# Patient Record
Sex: MALE | Race: WHITE | NOT HISPANIC OR LATINO | Employment: OTHER | URBAN - METROPOLITAN AREA
[De-identification: names, ages, dates, MRNs, and addresses within clinical notes are randomized per-mention and may not be internally consistent; named-entity substitution may affect disease eponyms.]

---

## 2017-03-06 ENCOUNTER — OFFICE VISIT (OUTPATIENT)
Dept: CARDIOLOGY | Facility: CLINIC | Age: 67
End: 2017-03-06

## 2017-03-06 VITALS
SYSTOLIC BLOOD PRESSURE: 130 MMHG | HEART RATE: 95 BPM | BODY MASS INDEX: 30.13 KG/M2 | DIASTOLIC BLOOD PRESSURE: 90 MMHG | WEIGHT: 192 LBS | HEIGHT: 67 IN

## 2017-03-06 DIAGNOSIS — I25.10 CORONARY ARTERY DISEASE INVOLVING NATIVE CORONARY ARTERY OF NATIVE HEART WITHOUT ANGINA PECTORIS: Primary | ICD-10-CM

## 2017-03-06 DIAGNOSIS — I48.19 PERSISTENT ATRIAL FIBRILLATION (HCC): ICD-10-CM

## 2017-03-06 PROCEDURE — 99214 OFFICE O/P EST MOD 30 MIN: CPT | Performed by: INTERNAL MEDICINE

## 2017-03-06 PROCEDURE — 93000 ELECTROCARDIOGRAM COMPLETE: CPT | Performed by: INTERNAL MEDICINE

## 2017-03-06 NOTE — PROGRESS NOTES
Date of Office Visit: 2017  Encounter Provider: Kermit Ashby MD  Place of Service: [facility] CARDIOLOGY  Patient Name: Vicente Baker  :1950  [id number]    Chief Complaint   Patient presents with   • Coronary Artery Disease   • Atrial Fibrillation   :     HPI: Vicente Baker is a 66 y.o. male   He is here for a followup.  He has coronary disease and persistent atrial fibrillation that is asymptomatic.   In , we placed a 35 x 23 Cypher to his proximal circumflex, a 30 x 8 Cypher in his circumflex.  He really did not have any other significant coronary disease, and he has had normal left ventricular function.  In , he was noted to have asymptomatic atrial fibrillation.  He has not had any chest pain, shortness of breath, PND, orthopnea, edema, syncope or palpitations.  He otherwise is doing well.  No bleeding difficulties.            Past Medical History   Diagnosis Date   • Atrial fibrillation    • Bradycardia    • Coronary artery disease    • Hyperlipidemia    • Myocardial infarction        Past Surgical History   Procedure Laterality Date   • Coronary angioplasty with stent placement     • Cardiac catheterization         Social History     Social History   • Marital status:      Spouse name: N/A   • Number of children: N/A   • Years of education: N/A     Occupational History   • Not on file.     Social History Main Topics   • Smoking status: Former Smoker   • Smokeless tobacco: Not on file      Comment: caffeine use   • Alcohol use 1.2 oz/week     2 Glasses of wine per week      Comment: social   • Drug use: No   • Sexual activity: Not on file     Other Topics Concern   • Not on file     Social History Narrative       Family History   Problem Relation Age of Onset   • Esophageal cancer Father        Review of Systems   Constitution: Negative for decreased appetite, fever, malaise/fatigue and weight loss.   HENT: Negative for nosebleeds.    Eyes:  "Negative for double vision.   Cardiovascular: Negative for chest pain, claudication, cyanosis, dyspnea on exertion, irregular heartbeat, leg swelling, near-syncope, orthopnea, palpitations, paroxysmal nocturnal dyspnea and syncope.   Respiratory: Negative for cough, hemoptysis and shortness of breath.    Hematologic/Lymphatic: Negative for bleeding problem.   Skin: Negative for rash.   Musculoskeletal: Negative for falls and myalgias.   Gastrointestinal: Negative for hematochezia, jaundice, melena, nausea and vomiting.   Genitourinary: Negative for hematuria.   Neurological: Negative for dizziness and seizures.   Psychiatric/Behavioral: Negative for altered mental status and memory loss.       Allergies   Allergen Reactions   • No Known Drug Allergy          Current Outpatient Prescriptions:   •  aspirin 81 MG EC tablet, Take 81 mg by mouth Daily., Disp: , Rfl:   •  cholecalciferol (VITAMIN D3) 1000 UNITS tablet, Take 1,000 Units by mouth Daily., Disp: , Rfl:   •  coenzyme Q10 50 MG capsule capsule, Take  by mouth Daily., Disp: , Rfl:   •  lisinopril (PRINIVIL,ZESTRIL) 10 MG tablet, TAKE ONE TABLET BY MOUTH DAILY, Disp: 90 tablet, Rfl: 0  •  rosuvastatin (CRESTOR) 20 MG tablet, Take 40 mg by mouth Daily., Disp: , Rfl:   •  vitamin C (ASCORBIC ACID) 250 MG tablet, Take 250 mg by mouth Daily., Disp: , Rfl:      Objective:     Vitals:    03/06/17 1201   BP: 130/90   Pulse: 95   Weight: 192 lb (87.1 kg)   Height: 66.5\" (168.9 cm)     Body mass index is 30.53 kg/(m^2).    Physical Exam   Constitutional: He is oriented to person, place, and time. He appears well-developed and well-nourished.   HENT:   Head: Normocephalic.   Eyes: No scleral icterus.   Neck: No JVD present. No thyromegaly present.   Cardiovascular: Normal rate and normal heart sounds.  An irregularly irregular rhythm present. Exam reveals no gallop and no friction rub.    No murmur heard.  Pulmonary/Chest: Effort normal and breath sounds normal. He has no " wheezes. He has no rales.   Abdominal: Soft. There is no hepatosplenomegaly. There is no tenderness.   Musculoskeletal: Normal range of motion. He exhibits no edema.   Lymphadenopathy:     He has no cervical adenopathy.   Neurological: He is alert and oriented to person, place, and time.   Skin: Skin is warm and dry. No rash noted.   Psychiatric: He has a normal mood and affect.         ECG 12 Lead  Date/Time: 3/6/2017 12:35 PM  Performed by: TONIA CHRISTINE  Authorized by: TONIA CHRISTINE   Comparison: compared with previous ECG   Rhythm: atrial fibrillation  Clinical impression: abnormal ECG  Comments: ns ST-T wave abnormality                 Assessment:       Diagnosis Plan   1. Coronary artery disease involving native coronary artery of native heart without angina pectoris     2. Persistent atrial fibrillation            Plan:        He is doing well. He has no chest pain, shortness of breath.  No coronary symptoms.  He also does not have any symptoms from his atrial fibrillation.   At this point, I am not going to make any other changes.  We did talk about that he has a CHADS2-VASc score of 1, next year it will be 2;  we could consider anticoagulation then, but it is a balance.  I will just have him come in and see Lima Hernandez in a year, see me in two years.       Coronary Artery Disease  Assessment  • The patient has no angina    Plan  • Lifestyle modifications discussed include adhering to a heart healthy diet, maintenance of a healthy weight, medication compliance, regular exercise and regular monitoring of cholesterol and blood pressure    Subjective - Objective  • There has been a previous stent procedure using DICK  • Current antiplatelet therapy includes aspirin 81 mg      Atrial Fibrillation and Atrial Flutter  Assessment  • The patient has persistent atrial fibrillation  • This is non-valvular in etiology  • The patient's CHADS2-VASc score is 1  • A [id number] score of 1 is considered an intermediate  risk for a thromboembolic event  • Aspirin prescribed    Plan  • Continue in atrial fibrillation with rate control  • Continue aspirin for antithrombotic therapy, bleeding issues discussed    As always, it has been a pleasure to participate in your patient's care.      Sincerely,       Kermit Ashby MD

## 2018-03-07 ENCOUNTER — OFFICE VISIT (OUTPATIENT)
Dept: CARDIOLOGY | Facility: CLINIC | Age: 68
End: 2018-03-07

## 2018-03-07 VITALS
SYSTOLIC BLOOD PRESSURE: 122 MMHG | BODY MASS INDEX: 31.18 KG/M2 | WEIGHT: 194 LBS | OXYGEN SATURATION: 98 % | HEART RATE: 118 BPM | HEIGHT: 66 IN | DIASTOLIC BLOOD PRESSURE: 86 MMHG

## 2018-03-07 DIAGNOSIS — I25.10 CAD IN NATIVE ARTERY: Primary | ICD-10-CM

## 2018-03-07 DIAGNOSIS — I48.19 PERSISTENT ATRIAL FIBRILLATION (HCC): ICD-10-CM

## 2018-03-07 PROBLEM — J30.1 ALLERGIC RHINITIS DUE TO POLLEN: Status: ACTIVE | Noted: 2018-03-07

## 2018-03-07 PROBLEM — Z95.5 STATUS POST INSERTION OF DRUG-ELUTING STENT INTO LEFT ANTERIOR DESCENDING ARTERY: Status: ACTIVE | Noted: 2018-03-07

## 2018-03-07 PROBLEM — L40.9 PSORIASIS: Status: ACTIVE | Noted: 2017-05-23

## 2018-03-07 PROBLEM — E78.5 HYPERLIPIDEMIA LDL GOAL <70: Status: ACTIVE | Noted: 2017-10-16

## 2018-03-07 PROBLEM — I48.0 AF (PAROXYSMAL ATRIAL FIBRILLATION) (HCC): Status: ACTIVE | Noted: 2018-03-07

## 2018-03-07 PROCEDURE — 99214 OFFICE O/P EST MOD 30 MIN: CPT | Performed by: PHYSICIAN ASSISTANT

## 2018-03-07 PROCEDURE — 93000 ELECTROCARDIOGRAM COMPLETE: CPT | Performed by: PHYSICIAN ASSISTANT

## 2018-03-07 RX ORDER — DEXTROMETHORPHAN HYDROBROMIDE AND PROMETHAZINE HYDROCHLORIDE 15; 6.25 MG/5ML; MG/5ML
2.5-5 SYRUP ORAL
COMMUNITY
Start: 2018-03-02 | End: 2018-03-09

## 2018-03-07 RX ORDER — BENZONATATE 100 MG/1
100 CAPSULE ORAL 2 TIMES DAILY PRN
COMMUNITY
Start: 2018-03-02 | End: 2018-03-14 | Stop reason: ALTCHOICE

## 2018-03-07 NOTE — PROGRESS NOTES
Date of Office Visit: 2018  Encounter Provider: BENJAMIN Fishman  Place of Service: [facility] CARDIOLOGY  Patient Name: Vicente Baker  :1950    Chief Complaint   Patient presents with   • Coronary Artery Disease     1 year follow up   :     HPI: Vicente Baker is a 67 y.o. male , new to me, who presents today for follow-up.  Old records have been obtained and reviewed by me.  He is a patient of Dr. Ashby's with a past cardiac history significant for coronary artery disease and atrial fibrillation.  In  he underwent stent placement to his circumflex, and his other coronaries at that time had mild disease.  He had normal LV function.  In , he was noted to have asymptomatic atrial fibrillation.  He has not been anticoagulated because his chads 2 vascular score is 1.  He was last in our office to see Dr. Ashby on 3/6/2017.  At that visit he was doing well without complaints of angina or heart failure.  No changes were made to his medical regimen, and he is here today for yearly visit.   Over the past year he's been doing well from a cardiac standpoint.  He denies any chest pain, shortness of breath, palpitations, edema, dizziness, or syncope.  However, for the past 2 months he says he's been battling bronchitis.  He finally feels like he is getting better.  He has been on what sounds like several rounds of antibiotics as well as a Medrol Dosepak that he finished last week.  He has been taking a lot of medication with cough suppressants in it.  Today he is in atrial flutter with a heart rate in the 118 bpm, this is new for him.  He was asymptomatic from that standpoint.    Past Medical History:   Diagnosis Date   • Atrial fibrillation    • Bradycardia    • Coronary artery disease    • Hyperlipidemia    • Myocardial infarction        Past Surgical History:   Procedure Laterality Date   • CARDIAC CATHETERIZATION     • CORONARY ANGIOPLASTY WITH STENT PLACEMENT          Social History     Social History   • Marital status:      Spouse name: N/A   • Number of children: N/A   • Years of education: N/A     Occupational History   • Not on file.     Social History Main Topics   • Smoking status: Former Smoker   • Smokeless tobacco: Never Used      Comment: caffeine use   • Alcohol use 1.2 oz/week     2 Glasses of wine per week      Comment: social   • Drug use: No   • Sexual activity: Not on file     Other Topics Concern   • Not on file     Social History Narrative       Family History   Problem Relation Age of Onset   • No Known Problems Mother    • Esophageal cancer Father    • No Known Problems Sister    • No Known Problems Brother        Review of Systems   Constitution: Negative for chills, fever, malaise/fatigue, weight gain and weight loss.   HENT: Negative for ear pain, hearing loss, nosebleeds and sore throat.    Eyes: Negative for double vision, pain and visual disturbance.   Cardiovascular: Negative for chest pain, dyspnea on exertion, irregular heartbeat, leg swelling, near-syncope, orthopnea, palpitations, paroxysmal nocturnal dyspnea and syncope.   Respiratory: Positive for cough and shortness of breath. Negative for sleep disturbances due to breathing, snoring and wheezing.    Endocrine: Negative for cold intolerance, heat intolerance and polyuria.   Skin: Negative for itching and rash.   Musculoskeletal: Negative for joint pain, joint swelling and myalgias.   Gastrointestinal: Negative for abdominal pain, diarrhea, melena, nausea and vomiting.   Genitourinary: Negative for frequency, hematuria and hesitancy.   Neurological: Negative for excessive daytime sleepiness, headaches, light-headedness, numbness, paresthesias and seizures.   Psychiatric/Behavioral: Negative for altered mental status and depression.   Allergic/Immunologic: Negative.    All other systems reviewed and are negative.      Allergies   Allergen Reactions   • No Known Drug Allergy   "        Current Outpatient Prescriptions:   •  aspirin 325 MG tablet, Take 325 mg by mouth Daily., Disp: , Rfl:   •  benzonatate (TESSALON) 100 MG capsule, Take 100 mg by mouth 2 (Two) Times a Day As Needed., Disp: , Rfl:   •  coenzyme Q10 50 MG capsule capsule, Take 50 mg by mouth Daily., Disp: , Rfl:   •  lisinopril (PRINIVIL,ZESTRIL) 10 MG tablet, TAKE ONE TABLET BY MOUTH DAILY, Disp: 90 tablet, Rfl: 0  •  mupirocin (BACTROBAN) 2 % ointment, Apply  topically 3 (Three) Times a Day., Disp: 22 g, Rfl: 0  •  Omega-3 Fatty Acids (FISH OIL) 1000 MG capsule capsule, Take 1,000 mg by mouth Daily With Breakfast., Disp: , Rfl:   •  promethazine-dextromethorphan (PROMETHAZINE-DM) 6.25-15 MG/5ML syrup, Take 2.5-5 mL by mouth., Disp: , Rfl:   •  rosuvastatin (CRESTOR) 40 MG tablet, TAKE 1 TABLET BY MOUTH DAILY, Disp: , Rfl: 3  •  vitamin C (ASCORBIC ACID) 250 MG tablet, Take 1,000 mg by mouth Daily., Disp: , Rfl:      Objective:     Vitals:    03/07/18 1028 03/07/18 1043   BP: 120/88 122/86   BP Location: Right arm Left arm   Pulse: 118    SpO2: 98%    Weight: 88 kg (194 lb)    Height: 167.6 cm (66\")      Body mass index is 31.31 kg/(m^2).    PHYSICAL EXAM:    Physical Exam   Constitutional: He is oriented to person, place, and time. He appears well-developed and well-nourished. No distress.   HENT:   Head: Normocephalic and atraumatic.   Eyes: Pupils are equal, round, and reactive to light.   Neck: No JVD present. No thyromegaly present.   Cardiovascular: Normal heart sounds and intact distal pulses.  An irregular rhythm present. Tachycardia present.    No murmur heard.  Pulmonary/Chest: Effort normal and breath sounds normal. No respiratory distress.   Abdominal: Soft. Bowel sounds are normal. He exhibits no distension. There is no splenomegaly or hepatomegaly. There is no tenderness.   Musculoskeletal: Normal range of motion. He exhibits no edema.   Neurological: He is alert and oriented to person, place, and time.   Skin: " Skin is warm and dry. He is not diaphoretic. No erythema.   Psychiatric: He has a normal mood and affect. His behavior is normal. Judgment normal.         ECG 12 Lead  Date/Time: 3/7/2018 10:56 AM  Performed by: SABINA MERCADO.  Authorized by: SABINA MERCADO.   Comparison: compared with previous ECG from 3/6/2017  Rhythm: atrial flutter  BPM: 118  Clinical impression: abnormal ECG  Comments: Indication: Coronary artery disease, atrial fibrillation.              Assessment:       Diagnosis Plan   1. CAD in native artery  ECG 12 Lead   2. Persistent atrial fibrillation  ECG 12 Lead     Orders Placed This Encounter   Procedures   • ECG 12 Lead     This order was created via procedure documentation          Plan:       1.  Atrial Fibrillation and Atrial Flutter  Assessment  • The patient has persistent atrial fibrillation and paroxysmal atrial flutter  • This is non-valvular in etiology  • The patient's CHADS2-VASc score is 3  • A OUL9FU6-SXXd score of 2 or more is considered a high risk for a thromboembolic event  • Aspirin prescribed    Plan  • Continue in atrial fibrillation with rate control  • Continue aspirin for antithrombotic therapy, bleeding issues discussed    Subjective - Objective  • He has been in atrial flutter fibrillation, however now he is in atrial flutter with a heart rate of 118 bpm.  Although I think he's truly asymptomatic from this, it kind of hard to tell because he is recovering from bronchitis.  Certainly I feel like the bronchitis could have triggered this new irregular rhythm.  At any rate, he is now a chads 2 vascular score of 3, and we'll need to be anticoagulated on something more than aspirin.  Because of his new flutter, I would like to check an echocardiogram on him next week.  I would like for his rate to slow down.  I think that this is probably being triggered by his bronchitis, steroid Dosepak, and multiple cough suppressants.  I've asked him to stay off of any cough suppressants  or other stimulants until he gets his echocardiogram and a repeat ECG next week.  For right now, I do not want to start him on a beta blocker.  I like to get away from this bronchitis episode first.  If next week his ECG shows that his heart rate is still elevated, probably start him on some low-dose beta blocker.  We will need to anticoagulate him, and I will discuss this further with Dr. Ashby.    2.  Coronary Artery Disease  Assessment  • The patient has no angina    Subjective - Objective  • There has been a previous stent procedure using DICK  • Current antiplatelet therapy includes aspirin 325 mg  • He has no complete of angina or heart failure.  Continue current medical regimen.    He will follow-up with Dr. Ashby in 1 year or sooner if needed.  Further recommendations will be made pending the results of his echocardiogram and my discussion with Dr. Ashby.  As always, it has been a pleasure to participate in your patient's care.      Sincerely,         Lima Hernandez PA-C

## 2018-03-09 ENCOUNTER — TELEPHONE (OUTPATIENT)
Dept: CARDIOLOGY | Facility: CLINIC | Age: 68
End: 2018-03-09

## 2018-03-09 NOTE — TELEPHONE ENCOUNTER
I spoke with Dr. Ashby and with the patient.  Fortunately his heart rate has slowed down, he is now running in the 60s.  I'm going to start him on Xarelto 20 mg daily.  I'm going to decrease his aspirin down to 81 mg daily since we are starting him on the Xarelto.  Further recommendations will be made pending the results of his echocardiogram next week.  He indicated that he understood.

## 2018-03-14 ENCOUNTER — HOSPITAL ENCOUNTER (OUTPATIENT)
Dept: CARDIOLOGY | Facility: HOSPITAL | Age: 68
Discharge: HOME | End: 2018-03-14
Admitting: PHYSICIAN ASSISTANT

## 2018-03-14 ENCOUNTER — CLINICAL SUPPORT (OUTPATIENT)
Dept: CARDIOLOGY | Facility: CLINIC | Age: 68
End: 2018-03-14

## 2018-03-14 VITALS
HEIGHT: 66 IN | OXYGEN SATURATION: 97 % | DIASTOLIC BLOOD PRESSURE: 93 MMHG | HEART RATE: 100 BPM | WEIGHT: 190 LBS | BODY MASS INDEX: 30.53 KG/M2 | SYSTOLIC BLOOD PRESSURE: 135 MMHG

## 2018-03-14 DIAGNOSIS — I48.92 ATRIAL FLUTTER, UNSPECIFIED TYPE (HCC): Primary | ICD-10-CM

## 2018-03-14 DIAGNOSIS — I48.19 PERSISTENT ATRIAL FIBRILLATION (HCC): ICD-10-CM

## 2018-03-14 LAB
BH CV ECHO MEAS - ACS: 2.1 CM
BH CV ECHO MEAS - AO MAX PG (FULL): -1.7 MMHG
BH CV ECHO MEAS - AO MAX PG: 3.5 MMHG
BH CV ECHO MEAS - AO MEAN PG (FULL): -0.48 MMHG
BH CV ECHO MEAS - AO MEAN PG: 1.7 MMHG
BH CV ECHO MEAS - AO ROOT AREA (BSA CORRECTED): 1.5
BH CV ECHO MEAS - AO ROOT AREA: 6.5 CM^2
BH CV ECHO MEAS - AO ROOT DIAM: 2.9 CM
BH CV ECHO MEAS - AO V2 MAX: 94.1 CM/SEC
BH CV ECHO MEAS - AO V2 MEAN: 58.8 CM/SEC
BH CV ECHO MEAS - AO V2 VTI: 16.8 CM
BH CV ECHO MEAS - AVA(I,A): 2.7 CM^2
BH CV ECHO MEAS - AVA(I,D): 2.7 CM^2
BH CV ECHO MEAS - AVA(V,A): 3 CM^2
BH CV ECHO MEAS - AVA(V,D): 3 CM^2
BH CV ECHO MEAS - BSA(HAYCOCK): 2 M^2
BH CV ECHO MEAS - BSA: 2 M^2
BH CV ECHO MEAS - BZI_BMI: 30.7 KILOGRAMS/M^2
BH CV ECHO MEAS - BZI_METRIC_HEIGHT: 167.6 CM
BH CV ECHO MEAS - BZI_METRIC_WEIGHT: 86.2 KG
BH CV ECHO MEAS - CONTRAST EF (2CH): 37.5 ML/M^2
BH CV ECHO MEAS - CONTRAST EF 4CH: 32.1 ML/M^2
BH CV ECHO MEAS - EDV(MOD-SP2): 64 ML
BH CV ECHO MEAS - EDV(MOD-SP4): 81 ML
BH CV ECHO MEAS - EDV(TEICH): 132.3 ML
BH CV ECHO MEAS - EF(CUBED): 64.9 %
BH CV ECHO MEAS - EF(MOD-SP2): 37.5 %
BH CV ECHO MEAS - EF(MOD-SP4): 32.1 %
BH CV ECHO MEAS - EF(TEICH): 56 %
BH CV ECHO MEAS - ESV(MOD-SP2): 40 ML
BH CV ECHO MEAS - ESV(MOD-SP4): 55 ML
BH CV ECHO MEAS - ESV(TEICH): 58.2 ML
BH CV ECHO MEAS - FS: 29.5 %
BH CV ECHO MEAS - IVS/LVPW: 0.93
BH CV ECHO MEAS - IVSD: 1.1 CM
BH CV ECHO MEAS - LAT PEAK E' VEL: 13 CM/SEC
BH CV ECHO MEAS - LV DIASTOLIC VOL/BSA (35-75): 41.4 ML/M^2
BH CV ECHO MEAS - LV MASS(C)D: 235.1 GRAMS
BH CV ECHO MEAS - LV MASS(C)DI: 120.1 GRAMS/M^2
BH CV ECHO MEAS - LV MAX PG: 5.2 MMHG
BH CV ECHO MEAS - LV MEAN PG: 2.2 MMHG
BH CV ECHO MEAS - LV SYSTOLIC VOL/BSA (12-30): 28.1 ML/M^2
BH CV ECHO MEAS - LV V1 MAX: 114.5 CM/SEC
BH CV ECHO MEAS - LV V1 MEAN: 67.7 CM/SEC
BH CV ECHO MEAS - LV V1 VTI: 17.9 CM
BH CV ECHO MEAS - LVIDD: 5.2 CM
BH CV ECHO MEAS - LVIDS: 3.7 CM
BH CV ECHO MEAS - LVLD AP2: 6.7 CM
BH CV ECHO MEAS - LVLD AP4: 7.2 CM
BH CV ECHO MEAS - LVLS AP2: 6.6 CM
BH CV ECHO MEAS - LVLS AP4: 6.8 CM
BH CV ECHO MEAS - LVOT AREA (M): 2.5 CM^2
BH CV ECHO MEAS - LVOT AREA: 2.5 CM^2
BH CV ECHO MEAS - LVOT DIAM: 1.8 CM
BH CV ECHO MEAS - LVPWD: 1.2 CM
BH CV ECHO MEAS - MED PEAK E' VEL: 7 CM/SEC
BH CV ECHO MEAS - MV DEC SLOPE: 708.9 CM/SEC^2
BH CV ECHO MEAS - MV DEC TIME: 0.15 SEC
BH CV ECHO MEAS - MV E MAX VEL: 102 CM/SEC
BH CV ECHO MEAS - MV MAX PG: 3.8 MMHG
BH CV ECHO MEAS - MV MEAN PG: 2.2 MMHG
BH CV ECHO MEAS - MV P1/2T MAX VEL: 103.3 CM/SEC
BH CV ECHO MEAS - MV P1/2T: 42.7 MSEC
BH CV ECHO MEAS - MV V2 MAX: 97.9 CM/SEC
BH CV ECHO MEAS - MV V2 MEAN: 67.7 CM/SEC
BH CV ECHO MEAS - MV V2 VTI: 12.9 CM
BH CV ECHO MEAS - MVA P1/2T LCG: 2.1 CM^2
BH CV ECHO MEAS - MVA(P1/2T): 5.2 CM^2
BH CV ECHO MEAS - MVA(VTI): 3.5 CM^2
BH CV ECHO MEAS - PA MAX PG (FULL): 0.78 MMHG
BH CV ECHO MEAS - PA MAX PG: 1.9 MMHG
BH CV ECHO MEAS - PA V2 MAX: 68.8 CM/SEC
BH CV ECHO MEAS - PVA(V,A): 2.1 CM^2
BH CV ECHO MEAS - PVA(V,D): 2.1 CM^2
BH CV ECHO MEAS - QP/QS: 0.61
BH CV ECHO MEAS - RV MAX PG: 1.1 MMHG
BH CV ECHO MEAS - RV MEAN PG: 0.47 MMHG
BH CV ECHO MEAS - RV V1 MAX: 52.9 CM/SEC
BH CV ECHO MEAS - RV V1 MEAN: 30.1 CM/SEC
BH CV ECHO MEAS - RV V1 VTI: 9.8 CM
BH CV ECHO MEAS - RVOT AREA: 2.8 CM^2
BH CV ECHO MEAS - RVOT DIAM: 1.9 CM
BH CV ECHO MEAS - SI(AO): 55.4 ML/M^2
BH CV ECHO MEAS - SI(CUBED): 47.9 ML/M^2
BH CV ECHO MEAS - SI(LVOT): 22.8 ML/M^2
BH CV ECHO MEAS - SI(MOD-SP2): 12.3 ML/M^2
BH CV ECHO MEAS - SI(MOD-SP4): 13.3 ML/M^2
BH CV ECHO MEAS - SI(TEICH): 37.9 ML/M^2
BH CV ECHO MEAS - SUP REN AO DIAM: 2 CM
BH CV ECHO MEAS - SV(AO): 108.4 ML
BH CV ECHO MEAS - SV(CUBED): 93.8 ML
BH CV ECHO MEAS - SV(LVOT): 44.6 ML
BH CV ECHO MEAS - SV(MOD-SP2): 24 ML
BH CV ECHO MEAS - SV(MOD-SP4): 26 ML
BH CV ECHO MEAS - SV(RVOT): 27.4 ML
BH CV ECHO MEAS - SV(TEICH): 74.1 ML
BH CV ECHO MEAS - TAPSE (>1.6): 1.8 CM2
BH CV XLRA - RV BASE: 2.9 CM
BH CV XLRA - TDI S': 12 CM/SEC
E/E' RATIO: 11
LEFT ATRIUM VOLUME INDEX: 34 ML/M2
LEFT ATRIUM VOLUME: 68 CM3
MAXIMAL PREDICTED HEART RATE: 155 BPM
STRESS TARGET HR: 132 BPM

## 2018-03-14 PROCEDURE — 93306 TTE W/DOPPLER COMPLETE: CPT

## 2018-03-14 PROCEDURE — 25010000002 PERFLUTREN (DEFINITY) 8.476 MG IN SODIUM CHLORIDE 0.9 % 10 ML INJECTION: Performed by: PHYSICIAN ASSISTANT

## 2018-03-14 PROCEDURE — 93306 TTE W/DOPPLER COMPLETE: CPT | Performed by: INTERNAL MEDICINE

## 2018-03-14 PROCEDURE — 93000 ELECTROCARDIOGRAM COMPLETE: CPT | Performed by: PHYSICIAN ASSISTANT

## 2018-03-14 RX ADMIN — PERFLUTREN 1.5 ML: 6.52 INJECTION, SUSPENSION INTRAVENOUS at 11:00

## 2018-03-14 NOTE — PROGRESS NOTES
Procedure     ECG 12 Lead  Date/Time: 3/14/2018 4:20 PM  Performed by: SABINA MERCADO.  Authorized by: SABINA MERCADO   Comparison: compared with previous ECG from 3/7/2018  Similar to previous ECG  Rhythm: atrial flutter  BPM: 72  Clinical impression: abnormal ECG  Comments: Indication: Atrial flutter with rapid ventricular response.           Reviewed EKG with David Amato.  Pt currently is on Xarelto and ASA 81mg.  Per Sabina- Stop fish oil.  Continue to hold the cold medication.  She will get the ECHO and review further directions with the pt.  Ok with pt.  I did place a copy of the EKG in your INBOX.  (done)

## 2018-03-15 ENCOUNTER — TELEPHONE (OUTPATIENT)
Dept: CARDIOLOGY | Facility: CLINIC | Age: 68
End: 2018-03-15

## 2018-03-15 DIAGNOSIS — I48.0 AF (PAROXYSMAL ATRIAL FIBRILLATION) (HCC): Primary | ICD-10-CM

## 2018-03-15 NOTE — TELEPHONE ENCOUNTER
I spoke with the patient.  Dr. Ashby would like him to see Dr. Barrios for possible ablation because his EF is reduced to 40%, revealed probably the atrial flutter.  I've asked him to avoid pseudoephedrine, he has a terrible cold and wants something to take.    Agnes and/or Ainsley, please get him in to see Sophie sooner rather than later.  I am fine if he sees Anila on a day that Sophie is here to talk to him about ablation.    P

## 2018-03-16 NOTE — TELEPHONE ENCOUNTER
JOSE ALBERTO and this appointment has been scheduled.    Lima, can you please place a referral to EP?    Thank you  Agnes GRIER

## 2018-03-19 ENCOUNTER — OFFICE VISIT (OUTPATIENT)
Dept: CARDIOLOGY | Facility: CLINIC | Age: 68
End: 2018-03-19

## 2018-03-19 VITALS
WEIGHT: 189 LBS | DIASTOLIC BLOOD PRESSURE: 78 MMHG | BODY MASS INDEX: 30.37 KG/M2 | HEART RATE: 124 BPM | SYSTOLIC BLOOD PRESSURE: 118 MMHG | HEIGHT: 66 IN

## 2018-03-19 DIAGNOSIS — I25.10 CAD IN NATIVE ARTERY: Primary | ICD-10-CM

## 2018-03-19 DIAGNOSIS — I48.3 TYPICAL ATRIAL FLUTTER (HCC): ICD-10-CM

## 2018-03-19 PROCEDURE — 99203 OFFICE O/P NEW LOW 30 MIN: CPT | Performed by: INTERNAL MEDICINE

## 2018-03-19 PROCEDURE — 93000 ELECTROCARDIOGRAM COMPLETE: CPT | Performed by: INTERNAL MEDICINE

## 2018-03-19 RX ORDER — AMOXICILLIN AND CLAVULANATE POTASSIUM 875; 125 MG/1; MG/1
1 TABLET, FILM COATED ORAL
COMMUNITY
Start: 2018-03-15 | End: 2018-03-29

## 2018-03-19 RX ORDER — CARVEDILOL 25 MG/1
25 TABLET ORAL 2 TIMES DAILY WITH MEALS
Qty: 180 TABLET | Refills: 1 | Status: ON HOLD | OUTPATIENT
Start: 2018-03-19 | End: 2018-04-24

## 2018-03-19 NOTE — PROGRESS NOTES
Date of Office Visit: 2018  Encounter Provider: Aaron Barrios MD  Place of Service: [facility] CARDIOLOGY  Patient Name: Vicente Baker  : 1950    Subjective:     Encounter Date:2018      Patient ID: Vicente Baker is a 67 y.o. male who has a cc of atrial flutter which was discovered at his yearly fu.  He had a resp infection in . A bad one. It was disc in March by Liam DREW during a routine yearly check up. He had no idea he was in it.     In , he had an episode of asymptomatic AF.     No anginal chest pain,   Mild  brownlee, that mostly started with the bronchitis., Sleep is disrupted by cough and drainage.   No soa,   No fainting,  No orthostasis.   No edema.   Exercise tolerance: walks a lot at work.     There have been no hospital admission since the last visit.     There have been no bleeding events.       Past Medical History:   Diagnosis Date   • Atrial fibrillation    • Bradycardia    • Coronary artery disease    • Hyperlipidemia    • Myocardial infarction        Social History     Social History   • Marital status:      Spouse name: N/A   • Number of children: N/A   • Years of education: N/A     Occupational History   • Not on file.     Social History Main Topics   • Smoking status: Former Smoker   • Smokeless tobacco: Never Used      Comment: caffeine use   • Alcohol use 1.2 oz/week     2 Glasses of wine per week      Comment: social   • Drug use: No   • Sexual activity: Not on file     Other Topics Concern   • Not on file     Social History Narrative   • No narrative on file       Review of Systems   Constitution: Negative for fever and night sweats.   HENT: Negative for ear pain and stridor.    Eyes: Negative for discharge and visual halos.   Cardiovascular: Negative for cyanosis.   Respiratory: Positive for cough. Negative for hemoptysis and sputum production.    Hematologic/Lymphatic: Negative for adenopathy.   Skin: Negative for nail  "changes and unusual hair distribution.   Musculoskeletal: Negative for gout and joint swelling.   Gastrointestinal: Negative for bowel incontinence and flatus.   Genitourinary: Negative for dysuria and flank pain.   Neurological: Negative for seizures and tremors.   Psychiatric/Behavioral: Negative for altered mental status. The patient is not nervous/anxious.             Objective:     Vitals:    03/19/18 0839   BP: 118/78   Pulse: (!) 124   Weight: 85.7 kg (189 lb)   Height: 167.6 cm (66\")         Physical Exam   Constitutional: He is oriented to person, place, and time.   HENT:   Head: Normocephalic and atraumatic.   Eyes: Right eye exhibits no discharge. Left eye exhibits no discharge.   Neck: No JVD present. No thyromegaly present.   Cardiovascular: Normal rate.  An irregularly irregular rhythm present. Exam reveals no gallop and no friction rub.    No murmur heard.  Pulmonary/Chest: Effort normal and breath sounds normal. He has no rales.   Abdominal: Soft. Bowel sounds are normal. There is no tenderness.   Musculoskeletal: Normal range of motion. He exhibits no edema or deformity.   Neurological: He is alert and oriented to person, place, and time. He exhibits normal muscle tone.   Skin: Skin is warm and dry. No erythema.   Psychiatric: He has a normal mood and affect. His behavior is normal. Thought content normal.         ECG 12 Lead  Date/Time: 3/19/2018 9:18 AM  Performed by: BALWINDER LANDIS  Authorized by: BALWINDER LANDIS   Previous ECG: no previous ECG available  Rhythm: atrial flutter  Clinical impression: abnormal ECG            Lab Review:       Assessment:          Diagnosis Plan   1. AF (paroxysmal atrial fibrillation)     2. CAD in native artery            Plan:         Atrial Fibrillation and Atrial Flutter  Assessment  • The patient has atrial flutter-initial episode  • This is non-valvular in etiology  • The patient's CHADS2-VASc score is 2  • A SQX9YE0-EJBx score of 2 or more is considered a " high risk for a thromboembolic event  • Rivaroxaban prescribed    Plan  • Attempt to maintain sinus rhythm  • Add rivaroxaban for antithrombotic therapy  • Add beta blocker for rhythm control  • I discussed atrial flutter therapies   this includes CV v ablation. I favor ablation  We also know he has had some AF so after AFlutter ablation, we will rec risk factor recommendation.   His wife says he snores, so will refer to sleep doctors.   Also we will start meds for rate control   Risks discussed on flutter ablation and that AF could recur

## 2018-04-20 ENCOUNTER — LAB (OUTPATIENT)
Dept: LAB | Facility: HOSPITAL | Age: 68
End: 2018-04-20
Attending: INTERNAL MEDICINE

## 2018-04-20 ENCOUNTER — TRANSCRIBE ORDERS (OUTPATIENT)
Dept: ADMINISTRATIVE | Facility: HOSPITAL | Age: 68
End: 2018-04-20

## 2018-04-20 DIAGNOSIS — Z01.810 PRE-OPERATIVE CARDIOVASCULAR EXAMINATION: Primary | ICD-10-CM

## 2018-04-20 DIAGNOSIS — Z13.6 SCREENING FOR ISCHEMIC HEART DISEASE: ICD-10-CM

## 2018-04-20 DIAGNOSIS — Z01.810 PRE-OPERATIVE CARDIOVASCULAR EXAMINATION: ICD-10-CM

## 2018-04-20 LAB
ANION GAP SERPL CALCULATED.3IONS-SCNC: 13 MMOL/L
BASOPHILS # BLD AUTO: 0.08 10*3/MM3 (ref 0–0.2)
BASOPHILS NFR BLD AUTO: 1.1 % (ref 0–1.5)
BUN BLD-MCNC: 21 MG/DL (ref 8–23)
BUN/CREAT SERPL: 19.1 (ref 7–25)
CALCIUM SPEC-SCNC: 9 MG/DL (ref 8.6–10.5)
CHLORIDE SERPL-SCNC: 102 MMOL/L (ref 98–107)
CO2 SERPL-SCNC: 26 MMOL/L (ref 22–29)
CREAT BLD-MCNC: 1.1 MG/DL (ref 0.76–1.27)
DEPRECATED RDW RBC AUTO: 48.7 FL (ref 37–54)
EOSINOPHIL # BLD AUTO: 0.35 10*3/MM3 (ref 0–0.7)
EOSINOPHIL NFR BLD AUTO: 4.8 % (ref 0.3–6.2)
ERYTHROCYTE [DISTWIDTH] IN BLOOD BY AUTOMATED COUNT: 13.5 % (ref 11.5–14.5)
GFR SERPL CREATININE-BSD FRML MDRD: 67 ML/MIN/1.73
GLUCOSE BLD-MCNC: 105 MG/DL (ref 65–99)
HCT VFR BLD AUTO: 47.8 % (ref 40.4–52.2)
HGB BLD-MCNC: 16.3 G/DL (ref 13.7–17.6)
IMM GRANULOCYTES # BLD: 0.02 10*3/MM3 (ref 0–0.03)
IMM GRANULOCYTES NFR BLD: 0.3 % (ref 0–0.5)
LYMPHOCYTES # BLD AUTO: 2.66 10*3/MM3 (ref 0.9–4.8)
LYMPHOCYTES NFR BLD AUTO: 36.5 % (ref 19.6–45.3)
MCH RBC QN AUTO: 33.7 PG (ref 27–32.7)
MCHC RBC AUTO-ENTMCNC: 34.1 G/DL (ref 32.6–36.4)
MCV RBC AUTO: 99 FL (ref 79.8–96.2)
MONOCYTES # BLD AUTO: 0.56 10*3/MM3 (ref 0.2–1.2)
MONOCYTES NFR BLD AUTO: 7.7 % (ref 5–12)
NEUTROPHILS # BLD AUTO: 3.62 10*3/MM3 (ref 1.9–8.1)
NEUTROPHILS NFR BLD AUTO: 49.6 % (ref 42.7–76)
PLATELET # BLD AUTO: 192 10*3/MM3 (ref 140–500)
PMV BLD AUTO: 10.8 FL (ref 6–12)
POTASSIUM BLD-SCNC: 4.9 MMOL/L (ref 3.5–5.2)
RBC # BLD AUTO: 4.83 10*6/MM3 (ref 4.6–6)
SODIUM BLD-SCNC: 141 MMOL/L (ref 136–145)
WBC NRBC COR # BLD: 7.29 10*3/MM3 (ref 4.5–10.7)

## 2018-04-20 PROCEDURE — 85025 COMPLETE CBC W/AUTO DIFF WBC: CPT

## 2018-04-20 PROCEDURE — 80048 BASIC METABOLIC PNL TOTAL CA: CPT

## 2018-04-20 PROCEDURE — 36415 COLL VENOUS BLD VENIPUNCTURE: CPT

## 2018-04-24 ENCOUNTER — HOSPITAL ENCOUNTER (OUTPATIENT)
Facility: HOSPITAL | Age: 68
Discharge: HOME | End: 2018-04-24
Attending: INTERNAL MEDICINE | Admitting: INTERNAL MEDICINE

## 2018-04-24 ENCOUNTER — ANESTHESIA (OUTPATIENT)
Dept: CARDIOLOGY | Facility: HOSPITAL | Age: 68
End: 2018-04-24

## 2018-04-24 ENCOUNTER — APPOINTMENT (OUTPATIENT)
Dept: CARDIOLOGY | Facility: HOSPITAL | Age: 68
End: 2018-04-24

## 2018-04-24 ENCOUNTER — ANESTHESIA EVENT (OUTPATIENT)
Dept: CARDIOLOGY | Facility: HOSPITAL | Age: 68
End: 2018-04-24

## 2018-04-24 VITALS
BODY MASS INDEX: 31.02 KG/M2 | DIASTOLIC BLOOD PRESSURE: 76 MMHG | WEIGHT: 193 LBS | HEART RATE: 75 BPM | OXYGEN SATURATION: 99 % | SYSTOLIC BLOOD PRESSURE: 101 MMHG | TEMPERATURE: 99.2 F | RESPIRATION RATE: 16 BRPM | HEIGHT: 66 IN

## 2018-04-24 PROCEDURE — C1730 CATH, EP, 19 OR FEW ELECT: HCPCS | Performed by: INTERNAL MEDICINE

## 2018-04-24 PROCEDURE — 25010000002 FENTANYL CITRATE (PF) 100 MCG/2ML SOLUTION: Performed by: NURSE ANESTHETIST, CERTIFIED REGISTERED

## 2018-04-24 PROCEDURE — 25010000002 ONDANSETRON PER 1 MG: Performed by: NURSE ANESTHETIST, CERTIFIED REGISTERED

## 2018-04-24 PROCEDURE — 93653 COMPRE EP EVAL TX SVT: CPT | Performed by: INTERNAL MEDICINE

## 2018-04-24 PROCEDURE — 93308 TTE F-UP OR LMTD: CPT

## 2018-04-24 PROCEDURE — C1894 INTRO/SHEATH, NON-LASER: HCPCS | Performed by: INTERNAL MEDICINE

## 2018-04-24 PROCEDURE — 93010 ELECTROCARDIOGRAM REPORT: CPT | Performed by: INTERNAL MEDICINE

## 2018-04-24 PROCEDURE — 93621 COMP EP EVL L PAC&REC C SINS: CPT | Performed by: INTERNAL MEDICINE

## 2018-04-24 PROCEDURE — 93325 DOPPLER ECHO COLOR FLOW MAPG: CPT

## 2018-04-24 PROCEDURE — 25010000002 MIDAZOLAM PER 1 MG: Performed by: ANESTHESIOLOGY

## 2018-04-24 PROCEDURE — 93308 TTE F-UP OR LMTD: CPT | Performed by: INTERNAL MEDICINE

## 2018-04-24 PROCEDURE — 93613 INTRACARDIAC EPHYS 3D MAPG: CPT | Performed by: INTERNAL MEDICINE

## 2018-04-24 PROCEDURE — 25010000002 DEXAMETHASONE PER 1 MG: Performed by: NURSE ANESTHETIST, CERTIFIED REGISTERED

## 2018-04-24 PROCEDURE — 93325 DOPPLER ECHO COLOR FLOW MAPG: CPT | Performed by: INTERNAL MEDICINE

## 2018-04-24 PROCEDURE — 93321 DOPPLER ECHO F-UP/LMTD STD: CPT

## 2018-04-24 PROCEDURE — 93005 ELECTROCARDIOGRAM TRACING: CPT | Performed by: INTERNAL MEDICINE

## 2018-04-24 PROCEDURE — 25010000002 PHENYLEPHRINE PER 1 ML: Performed by: INTERNAL MEDICINE

## 2018-04-24 PROCEDURE — 25010000002 PROPOFOL 10 MG/ML EMULSION: Performed by: NURSE ANESTHETIST, CERTIFIED REGISTERED

## 2018-04-24 PROCEDURE — C1732 CATH, EP, DIAG/ABL, 3D/VECT: HCPCS | Performed by: INTERNAL MEDICINE

## 2018-04-24 PROCEDURE — 25010000002 PHENYLEPHRINE PER 1 ML: Performed by: NURSE ANESTHETIST, CERTIFIED REGISTERED

## 2018-04-24 PROCEDURE — S0260 H&P FOR SURGERY: HCPCS | Performed by: INTERNAL MEDICINE

## 2018-04-24 PROCEDURE — C1731 CATH, EP, 20 OR MORE ELEC: HCPCS | Performed by: INTERNAL MEDICINE

## 2018-04-24 PROCEDURE — C1893 INTRO/SHEATH, FIXED,NON-PEEL: HCPCS | Performed by: INTERNAL MEDICINE

## 2018-04-24 PROCEDURE — 25010000002 EPINEPHRINE PER 0.1 MG: Performed by: NURSE ANESTHETIST, CERTIFIED REGISTERED

## 2018-04-24 PROCEDURE — 93321 DOPPLER ECHO F-UP/LMTD STD: CPT | Performed by: INTERNAL MEDICINE

## 2018-04-24 RX ORDER — ATROPINE SULFATE 0.4 MG/ML
AMPUL (ML) INJECTION AS NEEDED
Status: DISCONTINUED | OUTPATIENT
Start: 2018-04-24 | End: 2018-04-24 | Stop reason: SURG

## 2018-04-24 RX ORDER — NALOXONE HCL 0.4 MG/ML
0.2 VIAL (ML) INJECTION AS NEEDED
Status: CANCELLED | OUTPATIENT
Start: 2018-04-24

## 2018-04-24 RX ORDER — HYDROMORPHONE HCL 110MG/55ML
0.5 PATIENT CONTROLLED ANALGESIA SYRINGE INTRAVENOUS
Status: DISCONTINUED | OUTPATIENT
Start: 2018-04-24 | End: 2018-04-24 | Stop reason: HOSPADM

## 2018-04-24 RX ORDER — PROMETHAZINE HYDROCHLORIDE 25 MG/1
12.5 TABLET ORAL ONCE AS NEEDED
Status: CANCELLED | OUTPATIENT
Start: 2018-04-24 | End: 2018-04-25

## 2018-04-24 RX ORDER — OXYCODONE AND ACETAMINOPHEN 7.5; 325 MG/1; MG/1
1 TABLET ORAL ONCE AS NEEDED
Status: CANCELLED | OUTPATIENT
Start: 2018-04-24 | End: 2018-04-25

## 2018-04-24 RX ORDER — FENTANYL CITRATE 50 UG/ML
50 INJECTION, SOLUTION INTRAMUSCULAR; INTRAVENOUS
Status: DISCONTINUED | OUTPATIENT
Start: 2018-04-24 | End: 2018-04-24 | Stop reason: HOSPADM

## 2018-04-24 RX ORDER — EPINEPHRINE 1 MG/ML
INJECTION, SOLUTION, CONCENTRATE INTRAVENOUS AS NEEDED
Status: DISCONTINUED | OUTPATIENT
Start: 2018-04-24 | End: 2018-04-24 | Stop reason: SURG

## 2018-04-24 RX ORDER — PROMETHAZINE HYDROCHLORIDE 25 MG/1
25 TABLET ORAL ONCE AS NEEDED
Status: CANCELLED | OUTPATIENT
Start: 2018-04-24

## 2018-04-24 RX ORDER — CARVEDILOL 25 MG/1
12.5 TABLET ORAL 2 TIMES DAILY WITH MEALS
Qty: 180 TABLET | Refills: 1
Start: 2018-04-24 | End: 2018-04-28

## 2018-04-24 RX ORDER — SODIUM CHLORIDE 0.9 % (FLUSH) 0.9 %
1-10 SYRINGE (ML) INJECTION AS NEEDED
Status: DISCONTINUED | OUTPATIENT
Start: 2018-04-24 | End: 2018-04-24 | Stop reason: HOSPADM

## 2018-04-24 RX ORDER — SODIUM CHLORIDE 9 MG/ML
75 INJECTION, SOLUTION INTRAVENOUS CONTINUOUS
Status: DISCONTINUED | OUTPATIENT
Start: 2018-04-24 | End: 2018-04-24 | Stop reason: HOSPADM

## 2018-04-24 RX ORDER — MIDAZOLAM HYDROCHLORIDE 1 MG/ML
2 INJECTION INTRAMUSCULAR; INTRAVENOUS
Status: DISCONTINUED | OUTPATIENT
Start: 2018-04-24 | End: 2018-04-24 | Stop reason: HOSPADM

## 2018-04-24 RX ORDER — LIDOCAINE HYDROCHLORIDE 20 MG/ML
INJECTION, SOLUTION INFILTRATION; PERINEURAL AS NEEDED
Status: DISCONTINUED | OUTPATIENT
Start: 2018-04-24 | End: 2018-04-24 | Stop reason: SURG

## 2018-04-24 RX ORDER — HYDROCODONE BITARTRATE AND ACETAMINOPHEN 10; 325 MG/1; MG/1
1 TABLET ORAL EVERY 4 HOURS PRN
Status: DISCONTINUED | OUTPATIENT
Start: 2018-04-24 | End: 2018-04-24 | Stop reason: HOSPADM

## 2018-04-24 RX ORDER — HYDRALAZINE HYDROCHLORIDE 20 MG/ML
5 INJECTION INTRAMUSCULAR; INTRAVENOUS
Status: CANCELLED | OUTPATIENT
Start: 2018-04-24

## 2018-04-24 RX ORDER — FAMOTIDINE 10 MG/ML
20 INJECTION, SOLUTION INTRAVENOUS ONCE
Status: COMPLETED | OUTPATIENT
Start: 2018-04-24 | End: 2018-04-24

## 2018-04-24 RX ORDER — HYDROCODONE BITARTRATE AND ACETAMINOPHEN 7.5; 325 MG/1; MG/1
1 TABLET ORAL EVERY 4 HOURS PRN
Status: DISCONTINUED | OUTPATIENT
Start: 2018-04-24 | End: 2018-04-24 | Stop reason: HOSPADM

## 2018-04-24 RX ORDER — EPHEDRINE SULFATE 50 MG/ML
INJECTION, SOLUTION INTRAVENOUS AS NEEDED
Status: DISCONTINUED | OUTPATIENT
Start: 2018-04-24 | End: 2018-04-24 | Stop reason: SURG

## 2018-04-24 RX ORDER — PROMETHAZINE HYDROCHLORIDE 25 MG/1
25 SUPPOSITORY RECTAL ONCE AS NEEDED
Status: CANCELLED | OUTPATIENT
Start: 2018-04-24

## 2018-04-24 RX ORDER — FENTANYL CITRATE 50 UG/ML
50 INJECTION, SOLUTION INTRAMUSCULAR; INTRAVENOUS
Status: CANCELLED | OUTPATIENT
Start: 2018-04-24

## 2018-04-24 RX ORDER — PROMETHAZINE HYDROCHLORIDE 25 MG/ML
12.5 INJECTION, SOLUTION INTRAMUSCULAR; INTRAVENOUS ONCE AS NEEDED
Status: CANCELLED | OUTPATIENT
Start: 2018-04-24

## 2018-04-24 RX ORDER — MIDAZOLAM HYDROCHLORIDE 1 MG/ML
1 INJECTION INTRAMUSCULAR; INTRAVENOUS
Status: DISCONTINUED | OUTPATIENT
Start: 2018-04-24 | End: 2018-04-24 | Stop reason: HOSPADM

## 2018-04-24 RX ORDER — LIDOCAINE HYDROCHLORIDE 10 MG/ML
0.5 INJECTION, SOLUTION EPIDURAL; INFILTRATION; INTRACAUDAL; PERINEURAL ONCE AS NEEDED
Status: DISCONTINUED | OUTPATIENT
Start: 2018-04-24 | End: 2018-04-24 | Stop reason: HOSPADM

## 2018-04-24 RX ORDER — DEXAMETHASONE SODIUM PHOSPHATE 10 MG/ML
INJECTION INTRAMUSCULAR; INTRAVENOUS AS NEEDED
Status: DISCONTINUED | OUTPATIENT
Start: 2018-04-24 | End: 2018-04-24 | Stop reason: SURG

## 2018-04-24 RX ORDER — ONDANSETRON 2 MG/ML
INJECTION INTRAMUSCULAR; INTRAVENOUS AS NEEDED
Status: DISCONTINUED | OUTPATIENT
Start: 2018-04-24 | End: 2018-04-24 | Stop reason: SURG

## 2018-04-24 RX ORDER — SODIUM CHLORIDE, SODIUM LACTATE, POTASSIUM CHLORIDE, CALCIUM CHLORIDE 600; 310; 30; 20 MG/100ML; MG/100ML; MG/100ML; MG/100ML
9 INJECTION, SOLUTION INTRAVENOUS CONTINUOUS
Status: DISCONTINUED | OUTPATIENT
Start: 2018-04-24 | End: 2018-04-24 | Stop reason: HOSPADM

## 2018-04-24 RX ORDER — FLUMAZENIL 0.1 MG/ML
0.2 INJECTION INTRAVENOUS AS NEEDED
Status: CANCELLED | OUTPATIENT
Start: 2018-04-24

## 2018-04-24 RX ORDER — NALOXONE HCL 0.4 MG/ML
0.4 VIAL (ML) INJECTION
Status: DISCONTINUED | OUTPATIENT
Start: 2018-04-24 | End: 2018-04-24 | Stop reason: HOSPADM

## 2018-04-24 RX ORDER — LIDOCAINE HYDROCHLORIDE AND EPINEPHRINE 10; 10 MG/ML; UG/ML
INJECTION, SOLUTION INFILTRATION; PERINEURAL AS NEEDED
Status: DISCONTINUED | OUTPATIENT
Start: 2018-04-24 | End: 2018-04-24 | Stop reason: HOSPADM

## 2018-04-24 RX ORDER — LIDOCAINE HYDROCHLORIDE 10 MG/ML
0.1 INJECTION, SOLUTION EPIDURAL; INFILTRATION; INTRACAUDAL; PERINEURAL ONCE AS NEEDED
Status: DISCONTINUED | OUTPATIENT
Start: 2018-04-24 | End: 2018-04-24 | Stop reason: HOSPADM

## 2018-04-24 RX ORDER — HYDROMORPHONE HYDROCHLORIDE 1 MG/ML
0.5 INJECTION, SOLUTION INTRAMUSCULAR; INTRAVENOUS; SUBCUTANEOUS
Status: CANCELLED | OUTPATIENT
Start: 2018-04-24

## 2018-04-24 RX ORDER — ONDANSETRON 2 MG/ML
4 INJECTION INTRAMUSCULAR; INTRAVENOUS ONCE AS NEEDED
Status: CANCELLED | OUTPATIENT
Start: 2018-04-24

## 2018-04-24 RX ORDER — EPHEDRINE SULFATE 50 MG/ML
5 INJECTION, SOLUTION INTRAVENOUS ONCE AS NEEDED
Status: CANCELLED | OUTPATIENT
Start: 2018-04-24

## 2018-04-24 RX ORDER — HYDROCODONE BITARTRATE AND ACETAMINOPHEN 7.5; 325 MG/1; MG/1
1 TABLET ORAL ONCE AS NEEDED
Status: CANCELLED | OUTPATIENT
Start: 2018-04-24 | End: 2018-04-25

## 2018-04-24 RX ORDER — ROCURONIUM BROMIDE 10 MG/ML
INJECTION, SOLUTION INTRAVENOUS AS NEEDED
Status: DISCONTINUED | OUTPATIENT
Start: 2018-04-24 | End: 2018-04-24 | Stop reason: SURG

## 2018-04-24 RX ORDER — PROPOFOL 10 MG/ML
VIAL (ML) INTRAVENOUS AS NEEDED
Status: DISCONTINUED | OUTPATIENT
Start: 2018-04-24 | End: 2018-04-24 | Stop reason: SURG

## 2018-04-24 RX ORDER — FENTANYL CITRATE 50 UG/ML
INJECTION, SOLUTION INTRAMUSCULAR; INTRAVENOUS AS NEEDED
Status: DISCONTINUED | OUTPATIENT
Start: 2018-04-24 | End: 2018-04-24 | Stop reason: SURG

## 2018-04-24 RX ORDER — DIPHENHYDRAMINE HYDROCHLORIDE 50 MG/ML
12.5 INJECTION INTRAMUSCULAR; INTRAVENOUS
Status: CANCELLED | OUTPATIENT
Start: 2018-04-24

## 2018-04-24 RX ORDER — MELATONIN
1000 DAILY
COMMUNITY
End: 2019-12-19

## 2018-04-24 RX ORDER — LABETALOL HYDROCHLORIDE 5 MG/ML
5 INJECTION, SOLUTION INTRAVENOUS
Status: CANCELLED | OUTPATIENT
Start: 2018-04-24

## 2018-04-24 RX ADMIN — ONDANSETRON 4 MG: 2 INJECTION INTRAMUSCULAR; INTRAVENOUS at 13:40

## 2018-04-24 RX ADMIN — LIDOCAINE HYDROCHLORIDE 60 MG: 20 INJECTION, SOLUTION INFILTRATION; PERINEURAL at 13:01

## 2018-04-24 RX ADMIN — EPHEDRINE SULFATE 10 MG: 50 INJECTION INTRAMUSCULAR; INTRAVENOUS; SUBCUTANEOUS at 13:54

## 2018-04-24 RX ADMIN — PHENYLEPHRINE HYDROCHLORIDE 200 MCG: 10 INJECTION INTRAVENOUS at 13:39

## 2018-04-24 RX ADMIN — ROCURONIUM BROMIDE 50 MG: 10 INJECTION INTRAVENOUS at 13:01

## 2018-04-24 RX ADMIN — FAMOTIDINE 20 MG: 10 INJECTION, SOLUTION INTRAVENOUS at 12:03

## 2018-04-24 RX ADMIN — PROPOFOL 150 MG: 10 INJECTION, EMULSION INTRAVENOUS at 13:01

## 2018-04-24 RX ADMIN — DEXAMETHASONE SODIUM PHOSPHATE 8 MG: 10 INJECTION INTRAMUSCULAR; INTRAVENOUS at 13:21

## 2018-04-24 RX ADMIN — EPINEPHRINE 1 MG: 1 INJECTION, SOLUTION INTRAMUSCULAR; SUBCUTANEOUS at 13:54

## 2018-04-24 RX ADMIN — ATROPINE SULFATE 0.4 MG: 0.4 INJECTION, SOLUTION INTRAMUSCULAR; INTRAVENOUS; SUBCUTANEOUS at 13:52

## 2018-04-24 RX ADMIN — PHENYLEPHRINE HYDROCHLORIDE 100 MCG: 10 INJECTION INTRAVENOUS at 13:05

## 2018-04-24 RX ADMIN — PHENYLEPHRINE HYDROCHLORIDE 200 MCG: 10 INJECTION INTRAVENOUS at 13:29

## 2018-04-24 RX ADMIN — EPHEDRINE SULFATE 10 MG: 50 INJECTION INTRAMUSCULAR; INTRAVENOUS; SUBCUTANEOUS at 13:47

## 2018-04-24 RX ADMIN — PHENYLEPHRINE HYDROCHLORIDE 100 MCG: 10 INJECTION INTRAVENOUS at 13:21

## 2018-04-24 RX ADMIN — PHENYLEPHRINE HYDROCHLORIDE 100 MCG: 10 INJECTION INTRAVENOUS at 16:10

## 2018-04-24 RX ADMIN — PHENYLEPHRINE HYDROCHLORIDE 200 MCG: 10 INJECTION INTRAVENOUS at 13:12

## 2018-04-24 RX ADMIN — SODIUM CHLORIDE 75 ML/HR: 9 INJECTION, SOLUTION INTRAVENOUS at 16:00

## 2018-04-24 RX ADMIN — SODIUM CHLORIDE 75 ML/HR: 9 INJECTION, SOLUTION INTRAVENOUS at 10:32

## 2018-04-24 RX ADMIN — MIDAZOLAM HYDROCHLORIDE 2 MG: 2 INJECTION, SOLUTION INTRAMUSCULAR; INTRAVENOUS at 12:16

## 2018-04-24 RX ADMIN — FENTANYL CITRATE 100 MCG: 50 INJECTION INTRAMUSCULAR; INTRAVENOUS at 12:54

## 2018-04-24 RX ADMIN — SUGAMMADEX 300 MG: 100 INJECTION, SOLUTION INTRAVENOUS at 14:01

## 2018-04-24 RX ADMIN — PHENYLEPHRINE HYDROCHLORIDE 100 MCG: 10 INJECTION INTRAVENOUS at 13:08

## 2018-04-24 NOTE — ANESTHESIA POSTPROCEDURE EVALUATION
Patient: Vicente Baker    Procedure Summary     Date:  04/24/18 Room / Location:  [facility]  /  [facility]    Anesthesia Start:  1243 Anesthesia Stop:  1445    Procedure:  Ablation atrial flutter (N/A ) Diagnosis:  (atrial flutter)    Provider:  Aaron Barrios MD Provider:  Pasquale Dc MD    Anesthesia Type:  general ASA Status:  3          Anesthesia Type: general  Last vitals  BP   (!) 81/64 (04/24/18 1710)   Temp   37.3 °C (99.2 °F) (04/24/18 1448)   Pulse   76 (04/24/18 1710)   Resp   16 (04/24/18 1710)     SpO2   100 % (04/24/18 1710)     Post Anesthesia Care and Evaluation    Patient location during evaluation: bedside  Pain management: adequate  Airway patency: patent  Anesthetic complications: No anesthetic complications    Cardiovascular status: acceptable  Respiratory status: acceptable  Hydration status: acceptable

## 2018-04-24 NOTE — ANESTHESIA PROCEDURE NOTES
Airway  Urgency: elective    Airway not difficult    General Information and Staff    Patient location during procedure: OR  Anesthesiologist: ALONSO FINE  CRNA: ADRIANA LOVELL    Indications and Patient Condition  Indications for airway management: airway protection    Preoxygenated: yes  Mask difficulty assessment: 2 - vent by mask + OA or adjuvant +/- NMBA    Final Airway Details  Final airway type: endotracheal airway      Successful airway: ETT  Cuffed: yes   Successful intubation technique: direct laryngoscopy  Endotracheal tube insertion site: oral  Blade: Thompson  Blade size: #2  ETT size: 7.5 mm  Cormack-Lehane Classification: grade I - full view of glottis  Placement verified by: chest auscultation and capnometry   Cuff volume (mL): 5  Measured from: lips  ETT to lips (cm): 22  Number of attempts at approach: 1    Additional Comments  EBBS: ETCO2+: Atraumatic intubation; Lips and teeth same as pre op

## 2018-04-24 NOTE — ANESTHESIA PREPROCEDURE EVALUATION
Anesthesia Evaluation     Patient summary reviewed and Nursing notes reviewed   NPO Solid Status: > 8 hours  NPO Liquid Status: > 2 hours           Airway   Mallampati: II  Dental - normal exam     Pulmonary - normal exam   Cardiovascular - normal exam    Patient on routine beta blocker    (+) past MI , CAD, cardiac stents dysrhythmias Atrial Flutter, hyperlipidemia,       Neuro/Psych  GI/Hepatic/Renal/Endo      Musculoskeletal     Abdominal    Substance History      OB/GYN          Other                        Anesthesia Plan    ASA 3     general     Anesthetic plan and risks discussed with patient.

## 2018-04-25 LAB
BH CV ECHO MEAS - BSA(HAYCOCK): 2 M^2
BH CV ECHO MEAS - BSA: 2 M^2
BH CV ECHO MEAS - BZI_BMI: 31.2 KILOGRAMS/M^2
BH CV ECHO MEAS - BZI_METRIC_HEIGHT: 167.6 CM
BH CV ECHO MEAS - BZI_METRIC_WEIGHT: 87.5 KG
BH CV ECHO MEAS - EDV(CUBED): 97.3 ML
BH CV ECHO MEAS - EDV(TEICH): 97.3 ML
BH CV ECHO MEAS - EF(CUBED): 52.1 %
BH CV ECHO MEAS - EF(TEICH): 44.1 %
BH CV ECHO MEAS - ESV(CUBED): 46.7 ML
BH CV ECHO MEAS - ESV(TEICH): 54.4 ML
BH CV ECHO MEAS - FS: 21.7 %
BH CV ECHO MEAS - IVS/LVPW: 1
BH CV ECHO MEAS - IVSD: 1.1 CM
BH CV ECHO MEAS - LV MASS(C)D: 181.2 GRAMS
BH CV ECHO MEAS - LV MASS(C)DI: 92 GRAMS/M^2
BH CV ECHO MEAS - LVIDD: 4.6 CM
BH CV ECHO MEAS - LVIDS: 3.6 CM
BH CV ECHO MEAS - LVPWD: 1.1 CM
BH CV ECHO MEAS - SI(CUBED): 25.7 ML/M^2
BH CV ECHO MEAS - SI(TEICH): 21.8 ML/M^2
BH CV ECHO MEAS - SV(CUBED): 50.7 ML
BH CV ECHO MEAS - SV(TEICH): 42.9 ML
BH CV VAS BP RIGHT ARM: NORMAL MMHG
LV EF 2D ECHO EST: 55 %
MAXIMAL PREDICTED HEART RATE: 155 BPM
STRESS TARGET HR: 132 BPM

## 2018-04-26 ENCOUNTER — TELEPHONE (OUTPATIENT)
Dept: CARDIOLOGY | Facility: CLINIC | Age: 68
End: 2018-04-26

## 2018-04-26 NOTE — TELEPHONE ENCOUNTER
[phone]    Pt called stating he had a procedure with Dr Barrios on 4/24/18.  Pt states that he is now having some trouble with his vision.  Pt states he has some troubles seeing full figures.  He will be looking at his wife and only be able to see part of her face and the rest is a blur.  He states that if he goes in another room and rests his eyes his vision comes back 100%.      Pt denies slurring, confusion, drooping or headaches.     Can you advise?      [facility]

## 2018-04-26 NOTE — TELEPHONE ENCOUNTER
I sw pt   He did not take r-ban yesterday   OMG.  His eye is fine now and he promised to take the r-ban immed   I also told him anymore eye issues and go to the ER

## 2018-04-28 ENCOUNTER — APPOINTMENT (OUTPATIENT)
Dept: CARDIOLOGY | Facility: HOSPITAL | Age: 68
End: 2018-04-28
Attending: INTERNAL MEDICINE

## 2018-04-28 ENCOUNTER — APPOINTMENT (OUTPATIENT)
Dept: GENERAL RADIOLOGY | Facility: HOSPITAL | Age: 68
End: 2018-04-28

## 2018-04-28 ENCOUNTER — HOSPITAL ENCOUNTER (EMERGENCY)
Facility: HOSPITAL | Age: 68
Discharge: HOME | End: 2018-04-28
Attending: EMERGENCY MEDICINE | Admitting: EMERGENCY MEDICINE

## 2018-04-28 VITALS
OXYGEN SATURATION: 95 % | WEIGHT: 192 LBS | BODY MASS INDEX: 30.86 KG/M2 | HEIGHT: 66 IN | HEART RATE: 68 BPM | DIASTOLIC BLOOD PRESSURE: 74 MMHG | TEMPERATURE: 98.3 F | RESPIRATION RATE: 20 BRPM | SYSTOLIC BLOOD PRESSURE: 109 MMHG

## 2018-04-28 DIAGNOSIS — R00.1 BRADYCARDIA: Primary | ICD-10-CM

## 2018-04-28 DIAGNOSIS — R06.00 DYSPNEA, UNSPECIFIED TYPE: ICD-10-CM

## 2018-04-28 LAB
ALBUMIN SERPL-MCNC: 3.8 G/DL (ref 3.5–5.2)
ALBUMIN/GLOB SERPL: 1.6 G/DL
ALP SERPL-CCNC: 81 U/L (ref 39–117)
ALT SERPL W P-5'-P-CCNC: 110 U/L (ref 1–41)
ANION GAP SERPL CALCULATED.3IONS-SCNC: 11 MMOL/L
AORTIC ARCH: 2.95 CM
AORTIC DIMENSIONLESS INDEX: 0.7 (DI)
APTT PPP: 39.1 SECONDS (ref 22.7–35.4)
AST SERPL-CCNC: 50 U/L (ref 1–40)
BASOPHILS # BLD AUTO: 0.04 10*3/MM3 (ref 0–0.2)
BASOPHILS NFR BLD AUTO: 0.5 % (ref 0–1.5)
BH CV ECHO MEAS - ACS: 2.2 CM
BH CV ECHO MEAS - AO MAX PG (FULL): 2.1 MMHG
BH CV ECHO MEAS - AO MAX PG: 5.3 MMHG
BH CV ECHO MEAS - AO MEAN PG (FULL): 1 MMHG
BH CV ECHO MEAS - AO MEAN PG: 3 MMHG
BH CV ECHO MEAS - AO ROOT AREA (BSA CORRECTED): 1.8
BH CV ECHO MEAS - AO ROOT AREA: 10.2 CM^2
BH CV ECHO MEAS - AO ROOT DIAM: 3.6 CM
BH CV ECHO MEAS - AO V2 MAX: 115 CM/SEC
BH CV ECHO MEAS - AO V2 MEAN: 87.4 CM/SEC
BH CV ECHO MEAS - AO V2 VTI: 31.6 CM
BH CV ECHO MEAS - AVA(I,A): 2 CM^2
BH CV ECHO MEAS - AVA(I,D): 2 CM^2
BH CV ECHO MEAS - AVA(V,A): 2.2 CM^2
BH CV ECHO MEAS - AVA(V,D): 2.2 CM^2
BH CV ECHO MEAS - BSA(HAYCOCK): 2 M^2
BH CV ECHO MEAS - BSA: 2 M^2
BH CV ECHO MEAS - BZI_BMI: 31 KILOGRAMS/M^2
BH CV ECHO MEAS - BZI_METRIC_HEIGHT: 167.6 CM
BH CV ECHO MEAS - BZI_METRIC_WEIGHT: 87.1 KG
BH CV ECHO MEAS - CONTRAST EF (2CH): 67 ML/M^2
BH CV ECHO MEAS - CONTRAST EF 4CH: 60.2 ML/M^2
BH CV ECHO MEAS - EDV(CUBED): 140.6 ML
BH CV ECHO MEAS - EDV(MOD-SP2): 97 ML
BH CV ECHO MEAS - EDV(MOD-SP4): 83 ML
BH CV ECHO MEAS - EDV(TEICH): 129.5 ML
BH CV ECHO MEAS - EF(CUBED): 51 %
BH CV ECHO MEAS - EF(MOD-SP2): 67 %
BH CV ECHO MEAS - EF(MOD-SP4): 60.2 %
BH CV ECHO MEAS - EF(TEICH): 42.7 %
BH CV ECHO MEAS - ESV(CUBED): 68.9 ML
BH CV ECHO MEAS - ESV(MOD-SP2): 32 ML
BH CV ECHO MEAS - ESV(MOD-SP4): 33 ML
BH CV ECHO MEAS - ESV(TEICH): 74.2 ML
BH CV ECHO MEAS - FS: 21.2 %
BH CV ECHO MEAS - IVS/LVPW: 1.1
BH CV ECHO MEAS - IVSD: 1.3 CM
BH CV ECHO MEAS - LAT PEAK E' VEL: 8 CM/SEC
BH CV ECHO MEAS - LV DIASTOLIC VOL/BSA (35-75): 42.2 ML/M^2
BH CV ECHO MEAS - LV MASS(C)D: 263.5 GRAMS
BH CV ECHO MEAS - LV MASS(C)DI: 134 GRAMS/M^2
BH CV ECHO MEAS - LV MAX PG: 3.2 MMHG
BH CV ECHO MEAS - LV MEAN PG: 2 MMHG
BH CV ECHO MEAS - LV SYSTOLIC VOL/BSA (12-30): 16.8 ML/M^2
BH CV ECHO MEAS - LV V1 MAX: 89.4 CM/SEC
BH CV ECHO MEAS - LV V1 MEAN: 64.1 CM/SEC
BH CV ECHO MEAS - LV V1 VTI: 22.1 CM
BH CV ECHO MEAS - LVIDD: 5.2 CM
BH CV ECHO MEAS - LVIDS: 4.1 CM
BH CV ECHO MEAS - LVLD AP2: 7.3 CM
BH CV ECHO MEAS - LVLD AP4: 6.8 CM
BH CV ECHO MEAS - LVLS AP2: 6 CM
BH CV ECHO MEAS - LVLS AP4: 6.1 CM
BH CV ECHO MEAS - LVOT AREA (M): 2.8 CM^2
BH CV ECHO MEAS - LVOT AREA: 2.8 CM^2
BH CV ECHO MEAS - LVOT DIAM: 1.9 CM
BH CV ECHO MEAS - LVPWD: 1.2 CM
BH CV ECHO MEAS - MED PEAK E' VEL: 7 CM/SEC
BH CV ECHO MEAS - MV A DUR: 0.13 SEC
BH CV ECHO MEAS - MV A MAX VEL: 22.7 CM/SEC
BH CV ECHO MEAS - MV DEC SLOPE: 334 CM/SEC^2
BH CV ECHO MEAS - MV DEC TIME: 0.19 SEC
BH CV ECHO MEAS - MV E MAX VEL: 102 CM/SEC
BH CV ECHO MEAS - MV E/A: 4.5
BH CV ECHO MEAS - MV MAX PG: 4.4 MMHG
BH CV ECHO MEAS - MV MEAN PG: 1 MMHG
BH CV ECHO MEAS - MV P1/2T MAX VEL: 98.8 CM/SEC
BH CV ECHO MEAS - MV P1/2T: 86.6 MSEC
BH CV ECHO MEAS - MV V2 MAX: 105 CM/SEC
BH CV ECHO MEAS - MV V2 MEAN: 41.6 CM/SEC
BH CV ECHO MEAS - MV V2 VTI: 38.7 CM
BH CV ECHO MEAS - MVA P1/2T LCG: 2.2 CM^2
BH CV ECHO MEAS - MVA(P1/2T): 2.5 CM^2
BH CV ECHO MEAS - MVA(VTI): 1.6 CM^2
BH CV ECHO MEAS - PA ACC TIME: 0.09 SEC
BH CV ECHO MEAS - PA MAX PG (FULL): -0.11 MMHG
BH CV ECHO MEAS - PA MAX PG: 1.7 MMHG
BH CV ECHO MEAS - PA PR(ACCEL): 37.6 MMHG
BH CV ECHO MEAS - PA V2 MAX: 65.2 CM/SEC
BH CV ECHO MEAS - PULM A REVS DUR: 0.13 SEC
BH CV ECHO MEAS - PULM A REVS VEL: 15.9 CM/SEC
BH CV ECHO MEAS - PULM DIAS VEL: 60.4 CM/SEC
BH CV ECHO MEAS - PULM S/D: 0.68
BH CV ECHO MEAS - PULM SYS VEL: 41.1 CM/SEC
BH CV ECHO MEAS - PVA(V,A): 3.9 CM^2
BH CV ECHO MEAS - PVA(V,D): 3.9 CM^2
BH CV ECHO MEAS - QP/QS: 0.96
BH CV ECHO MEAS - RAP SYSTOLE: 15 MMHG
BH CV ECHO MEAS - RV MAX PG: 1.8 MMHG
BH CV ECHO MEAS - RV MEAN PG: 1 MMHG
BH CV ECHO MEAS - RV V1 MAX: 67.2 CM/SEC
BH CV ECHO MEAS - RV V1 MEAN: 50.2 CM/SEC
BH CV ECHO MEAS - RV V1 VTI: 15.9 CM
BH CV ECHO MEAS - RVOT AREA: 3.8 CM^2
BH CV ECHO MEAS - RVOT DIAM: 2.2 CM
BH CV ECHO MEAS - RVSP: 32 MMHG
BH CV ECHO MEAS - SI(AO): 163.6 ML/M^2
BH CV ECHO MEAS - SI(CUBED): 36.5 ML/M^2
BH CV ECHO MEAS - SI(LVOT): 31.9 ML/M^2
BH CV ECHO MEAS - SI(MOD-SP2): 33.1 ML/M^2
BH CV ECHO MEAS - SI(MOD-SP4): 25.4 ML/M^2
BH CV ECHO MEAS - SI(TEICH): 28.1 ML/M^2
BH CV ECHO MEAS - SUP REN AO DIAM: 2.45 CM
BH CV ECHO MEAS - SV(AO): 321.6 ML
BH CV ECHO MEAS - SV(CUBED): 71.7 ML
BH CV ECHO MEAS - SV(LVOT): 62.7 ML
BH CV ECHO MEAS - SV(MOD-SP2): 65 ML
BH CV ECHO MEAS - SV(MOD-SP4): 50 ML
BH CV ECHO MEAS - SV(RVOT): 60.4 ML
BH CV ECHO MEAS - SV(TEICH): 55.3 ML
BH CV ECHO MEAS - TAPSE (>1.6): 2.79 CM2
BH CV ECHO MEAS - TR MAX VEL: 207 CM/SEC
BH CV ECHO MEASUREMENTS AVERAGE E/E' RATIO: 13.6
BH CV VAS BP RIGHT ARM: NORMAL MMHG
BH CV XLRA - RV BASE: 3.6 CM
BH CV XLRA - TDI S': 12.3 CM/SEC
BILIRUB SERPL-MCNC: 1.4 MG/DL (ref 0.1–1.2)
BUN BLD-MCNC: 14 MG/DL (ref 8–23)
BUN/CREAT SERPL: 13.6 (ref 7–25)
CALCIUM SPEC-SCNC: 8.6 MG/DL (ref 8.6–10.5)
CHLORIDE SERPL-SCNC: 102 MMOL/L (ref 98–107)
CO2 SERPL-SCNC: 27 MMOL/L (ref 22–29)
CREAT BLD-MCNC: 1.03 MG/DL (ref 0.76–1.27)
DEPRECATED RDW RBC AUTO: 51.9 FL (ref 37–54)
EOSINOPHIL # BLD AUTO: 0.2 10*3/MM3 (ref 0–0.7)
EOSINOPHIL NFR BLD AUTO: 2.6 % (ref 0.3–6.2)
ERYTHROCYTE [DISTWIDTH] IN BLOOD BY AUTOMATED COUNT: 13.9 % (ref 11.5–14.5)
FLUAV AG NPH QL: NEGATIVE
FLUBV AG NPH QL IA: NEGATIVE
GFR SERPL CREATININE-BSD FRML MDRD: 72 ML/MIN/1.73
GLOBULIN UR ELPH-MCNC: 2.4 GM/DL
GLUCOSE BLD-MCNC: 118 MG/DL (ref 65–99)
HCT VFR BLD AUTO: 38.7 % (ref 40.4–52.2)
HGB BLD-MCNC: 12.6 G/DL (ref 13.7–17.6)
HOLD SPECIMEN: NORMAL
HOLD SPECIMEN: NORMAL
IMM GRANULOCYTES # BLD: 0.01 10*3/MM3 (ref 0–0.03)
IMM GRANULOCYTES NFR BLD: 0.1 % (ref 0–0.5)
INR PPP: 1.78 (ref 0.9–1.1)
LEFT ATRIUM VOLUME INDEX: 32 ML/M2
LYMPHOCYTES # BLD AUTO: 1.11 10*3/MM3 (ref 0.9–4.8)
LYMPHOCYTES NFR BLD AUTO: 14.5 % (ref 19.6–45.3)
MAGNESIUM SERPL-MCNC: 2 MG/DL (ref 1.6–2.4)
MAXIMAL PREDICTED HEART RATE: 155 BPM
MCH RBC QN AUTO: 33.2 PG (ref 27–32.7)
MCHC RBC AUTO-ENTMCNC: 32.6 G/DL (ref 32.6–36.4)
MCV RBC AUTO: 102.1 FL (ref 79.8–96.2)
MONOCYTES # BLD AUTO: 0.62 10*3/MM3 (ref 0.2–1.2)
MONOCYTES NFR BLD AUTO: 8.1 % (ref 5–12)
NEUTROPHILS # BLD AUTO: 5.7 10*3/MM3 (ref 1.9–8.1)
NEUTROPHILS NFR BLD AUTO: 74.3 % (ref 42.7–76)
NT-PROBNP SERPL-MCNC: 307.7 PG/ML (ref 0–900)
PLATELET # BLD AUTO: 174 10*3/MM3 (ref 140–500)
PMV BLD AUTO: 11.7 FL (ref 6–12)
POTASSIUM BLD-SCNC: 4.1 MMOL/L (ref 3.5–5.2)
PROT SERPL-MCNC: 6.2 G/DL (ref 6–8.5)
PROTHROMBIN TIME: 20.4 SECONDS (ref 11.7–14.2)
RBC # BLD AUTO: 3.79 10*6/MM3 (ref 4.6–6)
SODIUM BLD-SCNC: 140 MMOL/L (ref 136–145)
STRESS TARGET HR: 132 BPM
TROPONIN T SERPL-MCNC: 0.05 NG/ML (ref 0–0.03)
WBC NRBC COR # BLD: 7.67 10*3/MM3 (ref 4.5–10.7)
WHOLE BLOOD HOLD SPECIMEN: NORMAL
WHOLE BLOOD HOLD SPECIMEN: NORMAL

## 2018-04-28 PROCEDURE — 83735 ASSAY OF MAGNESIUM: CPT | Performed by: EMERGENCY MEDICINE

## 2018-04-28 PROCEDURE — 85730 THROMBOPLASTIN TIME PARTIAL: CPT | Performed by: EMERGENCY MEDICINE

## 2018-04-28 PROCEDURE — 93325 DOPPLER ECHO COLOR FLOW MAPG: CPT | Performed by: INTERNAL MEDICINE

## 2018-04-28 PROCEDURE — 93005 ELECTROCARDIOGRAM TRACING: CPT | Performed by: EMERGENCY MEDICINE

## 2018-04-28 PROCEDURE — 80053 COMPREHEN METABOLIC PANEL: CPT | Performed by: EMERGENCY MEDICINE

## 2018-04-28 PROCEDURE — 99284 EMERGENCY DEPT VISIT MOD MDM: CPT

## 2018-04-28 PROCEDURE — 93321 DOPPLER ECHO F-UP/LMTD STD: CPT | Performed by: INTERNAL MEDICINE

## 2018-04-28 PROCEDURE — 85610 PROTHROMBIN TIME: CPT | Performed by: EMERGENCY MEDICINE

## 2018-04-28 PROCEDURE — 83880 ASSAY OF NATRIURETIC PEPTIDE: CPT | Performed by: EMERGENCY MEDICINE

## 2018-04-28 PROCEDURE — 93321 DOPPLER ECHO F-UP/LMTD STD: CPT

## 2018-04-28 PROCEDURE — 85025 COMPLETE CBC W/AUTO DIFF WBC: CPT | Performed by: EMERGENCY MEDICINE

## 2018-04-28 PROCEDURE — 25010000002 FUROSEMIDE PER 20 MG

## 2018-04-28 PROCEDURE — 93010 ELECTROCARDIOGRAM REPORT: CPT | Performed by: INTERNAL MEDICINE

## 2018-04-28 PROCEDURE — 96374 THER/PROPH/DIAG INJ IV PUSH: CPT

## 2018-04-28 PROCEDURE — 93325 DOPPLER ECHO COLOR FLOW MAPG: CPT

## 2018-04-28 PROCEDURE — 36415 COLL VENOUS BLD VENIPUNCTURE: CPT

## 2018-04-28 PROCEDURE — 93308 TTE F-UP OR LMTD: CPT | Performed by: INTERNAL MEDICINE

## 2018-04-28 PROCEDURE — 71046 X-RAY EXAM CHEST 2 VIEWS: CPT

## 2018-04-28 PROCEDURE — 87804 INFLUENZA ASSAY W/OPTIC: CPT | Performed by: EMERGENCY MEDICINE

## 2018-04-28 PROCEDURE — 84484 ASSAY OF TROPONIN QUANT: CPT | Performed by: EMERGENCY MEDICINE

## 2018-04-28 PROCEDURE — 93308 TTE F-UP OR LMTD: CPT

## 2018-04-28 RX ORDER — FUROSEMIDE 20 MG/1
20 TABLET ORAL DAILY
Qty: 3 TABLET | Refills: 0 | Status: SHIPPED | OUTPATIENT
Start: 2018-04-28 | End: 2018-05-01

## 2018-04-28 RX ORDER — FUROSEMIDE 10 MG/ML
INJECTION INTRAMUSCULAR; INTRAVENOUS
Status: COMPLETED
Start: 2018-04-28 | End: 2018-04-28

## 2018-04-28 RX ORDER — FUROSEMIDE 10 MG/ML
40 INJECTION INTRAMUSCULAR; INTRAVENOUS ONCE
Status: COMPLETED | OUTPATIENT
Start: 2018-04-28 | End: 2018-04-28

## 2018-04-28 RX ORDER — CARVEDILOL 6.25 MG/1
6.25 TABLET ORAL 2 TIMES DAILY WITH MEALS
Qty: 30 TABLET | Refills: 0 | Status: SHIPPED | OUTPATIENT
Start: 2018-04-28 | End: 2018-05-01 | Stop reason: SDUPTHER

## 2018-04-28 RX ORDER — SODIUM CHLORIDE 0.9 % (FLUSH) 0.9 %
10 SYRINGE (ML) INJECTION AS NEEDED
Status: DISCONTINUED | OUTPATIENT
Start: 2018-04-28 | End: 2018-04-28 | Stop reason: HOSPADM

## 2018-04-28 RX ADMIN — FUROSEMIDE 40 MG: 10 INJECTION, SOLUTION INTRAMUSCULAR; INTRAVENOUS at 13:06

## 2018-04-28 RX ADMIN — FUROSEMIDE 40 MG: 10 INJECTION INTRAMUSCULAR; INTRAVENOUS at 13:06

## 2018-05-01 ENCOUNTER — OFFICE VISIT (OUTPATIENT)
Dept: CARDIOLOGY | Facility: CLINIC | Age: 68
End: 2018-05-01

## 2018-05-01 VITALS
HEIGHT: 66 IN | DIASTOLIC BLOOD PRESSURE: 78 MMHG | RESPIRATION RATE: 16 BRPM | SYSTOLIC BLOOD PRESSURE: 124 MMHG | BODY MASS INDEX: 30.53 KG/M2 | HEART RATE: 40 BPM | WEIGHT: 190 LBS

## 2018-05-01 DIAGNOSIS — I48.0 AF (PAROXYSMAL ATRIAL FIBRILLATION) (HCC): Primary | ICD-10-CM

## 2018-05-01 PROCEDURE — 99213 OFFICE O/P EST LOW 20 MIN: CPT | Performed by: PHYSICIAN ASSISTANT

## 2018-05-01 PROCEDURE — 93000 ELECTROCARDIOGRAM COMPLETE: CPT | Performed by: PHYSICIAN ASSISTANT

## 2018-05-01 RX ORDER — CARVEDILOL 6.25 MG/1
3.12 TABLET ORAL 2 TIMES DAILY WITH MEALS
Qty: 30 TABLET | Refills: 0
Start: 2018-05-01 | End: 2018-05-07 | Stop reason: SDUPTHER

## 2018-05-01 RX ORDER — UBIDECARENONE 200 MG
200 CAPSULE ORAL DAILY
COMMUNITY
End: 2019-02-22

## 2018-05-01 NOTE — PROGRESS NOTES
Date of Office Visit: 2018  Encounter Provider: BENJAMIN Fishman  Place of Service: [facility] CARDIOLOGY  Patient Name: Vicente Baker  :1950    Chief Complaint   Patient presents with   • Atrial Fibrillation   • Atrial Flutter   :     HPI: Vicente Baker is a 67 y.o. male who presents today for Follow-up.  Old records have been obtained and reviewed by me.  Is a patient of Dr. Ashby's who is well-known to me.  He has coronary disease and underwent stent placement to the circumflex in .  In  he had a symptomatic atrial fibrillation and we elected not to anticoagulate him because his chads 2 vascular score was 1.  He was in our office to see me on 3/7/2018.  At that visit, he was getting over bronchitis and had been using a lot of medication with cough suppressants in it.  He was in atrial flutter with a heart rate of 118 for him, which was negative.  He was asymptomatic.  His chads 2 vascular score is now a 3, so I started him on Xarelto 20 mg daily.  I wanted to wait until he got out of his acute bronchitis days before started him on a beta blocker.  I also checked an echocardiogram.  This showed a varying EF that arranged based on his arch our interval, his average EF was felt to be 40% with global hypokinesis.  He had borderline dilation of both his left and right atria, and no significant valvular abnormalities.  I felt that his new onset atrial flutter was most likely instigated by his bronchitis.  Because of his reduced EF, we referred him to see Dr. Barrios.  Ultimately on 2018 he underwent a flutter ablation by Dr. Barrios.  Then on 2018 he presented to the emergency room with complaints of shortness of breath that was worse with laying flat.  Evidently he did not restart his Xarelto when he got home after his ablation and held it for another 2 days.  He also had complaints of a 30 minute episode of loss of the right visual field of his  left eye.  It looks like he did call Dr. Barrios with the symptoms and it was recommended that he restart his Xarelto.  After he restarted his Xarelto on 4/26/2018, he did not experience any more episodes of vision loss.  His shortness of breath did improve with diuresis.  His carvedilol was changed to 6.25 mg twice a day and he was started on oral Lasix.  He is here today for follow-up.   Since he's been home from the ER he's been doing fairly well.  He took Lasix 20 mg daily for 3 days and then discontinued it.  He feels much better as far as his breathing goes.  He has not had anymore visual changes or other symptoms of TIA or stroke.  She denies any chest pain, palpitations, edema, dizziness, or syncope.  He still has a residual cough from his bronchitis.      Past Medical History:   Diagnosis Date   • Atrial fibrillation    • Bradycardia    • Coronary artery disease    • Hyperlipidemia    • Myocardial infarction    • Psoriasis        Past Surgical History:   Procedure Laterality Date   • CARDIAC CATHETERIZATION     • CARDIAC ELECTROPHYSIOLOGY PROCEDURE N/A 4/24/2018    Procedure: Ablation atrial flutter;  Surgeon: Aaron Barrios MD;  Location: [facility] INVASIVE LOCATION;  Service: Cardiovascular   • CORONARY ANGIOPLASTY WITH STENT PLACEMENT     • KNEE ARTHROSCOPY W/ MENISCAL REPAIR Left    • THYROID SURGERY      cyst removal   • TONSILLECTOMY         Social History     Social History   • Marital status:      Spouse name: N/A   • Number of children: N/A   • Years of education: N/A     Occupational History   • Not on file.     Social History Main Topics   • Smoking status: Former Smoker   • Smokeless tobacco: Never Used      Comment: caffeine use   • Alcohol use 1.2 oz/week     2 Glasses of wine per week      Comment: social   • Drug use: No   • Sexual activity: Defer     Other Topics Concern   • Not on file     Social History Narrative   • No narrative on file       Family History   Problem Relation  "Age of Onset   • No Known Problems Mother    • Esophageal cancer Father    • No Known Problems Sister    • No Known Problems Brother        Review of Systems   Constitution: Negative for chills, fever and malaise/fatigue.   Cardiovascular: Negative for chest pain, dyspnea on exertion, leg swelling, near-syncope, orthopnea, palpitations, paroxysmal nocturnal dyspnea and syncope.   Respiratory: Positive for cough. Negative for shortness of breath.    Musculoskeletal: Negative for joint pain, joint swelling and myalgias.   Gastrointestinal: Negative for abdominal pain, diarrhea, melena, nausea and vomiting.   Genitourinary: Negative for frequency and hematuria.   Neurological: Negative for light-headedness, numbness, paresthesias and seizures.   Allergic/Immunologic: Negative.    All other systems reviewed and are negative.      Allergies   Allergen Reactions   • No Known Drug Allergy          Current Outpatient Prescriptions:   •  ascorbic acid (GNP VITAMIN C) 1000 MG tablet, Take 1,000 mg by mouth Daily., Disp: , Rfl:   •  aspirin 81 MG tablet, Take 1 tablet by mouth Daily., Disp: 30 tablet, Rfl: 11  •  carvedilol (COREG) 6.25 MG tablet, Take 0.5 tablets by mouth 2 (Two) Times a Day With Meals., Disp: 30 tablet, Rfl: 0  •  cholecalciferol (VITAMIN D3) 1000 units tablet, Take 1,000 Units by mouth Daily., Disp: , Rfl:   •  Coenzyme Q10 200 MG capsule, Take 200 mg by mouth Daily., Disp: , Rfl:   •  rivaroxaban (XARELTO) 20 MG tablet, Take 1 tablet by mouth Daily With Dinner., Disp: 30 tablet, Rfl: 11  •  rosuvastatin (CRESTOR) 40 MG tablet, TAKE 1 TABLET BY MOUTH DAILY, Disp: , Rfl: 3      Objective:     Vitals:    05/01/18 0839 05/01/18 0842   BP: 118/76 124/78   BP Location: Left arm Right arm   Pulse: (!) 40    Resp: 16    Weight: 86.2 kg (190 lb)    Height: 167.6 cm (66\")      Body mass index is 30.67 kg/m².    PHYSICAL EXAM:    Physical Exam   Constitutional: He is oriented to person, place, and time. He appears " well-developed and well-nourished. No distress.   HENT:   Head: Normocephalic and atraumatic.   Eyes: Pupils are equal, round, and reactive to light.   Neck: No JVD present. No thyromegaly present.   Cardiovascular: Regular rhythm, normal heart sounds and intact distal pulses.  Bradycardia present.    No murmur heard.  Right femoral vein catheter site well healed without erythema or edema.   Pulmonary/Chest: Effort normal and breath sounds normal. No respiratory distress.   Abdominal: Soft. Bowel sounds are normal. He exhibits no distension. There is no splenomegaly or hepatomegaly. There is no tenderness.   Musculoskeletal: Normal range of motion. He exhibits no edema.   Neurological: He is alert and oriented to person, place, and time.   Skin: Skin is warm and dry. He is not diaphoretic. No erythema.   Psychiatric: He has a normal mood and affect. His behavior is normal. Judgment normal.         ECG 12 Lead  Date/Time: 5/1/2018 8:57 AM  Performed by: SABINA MERCADO.  Authorized by: SABINA MERCADO.   Comparison: compared with previous ECG from 4/28/2018  Similar to previous ECG  Rhythm: sinus bradycardia  BPM: 40  T depression: III  T flattening: II and aVF  Q waves: III and aVF  Clinical impression: abnormal ECG  Comments: Indication: Status post flutter ablation.              Assessment:       Diagnosis Plan   1. AF (paroxysmal atrial fibrillation)  ECG 12 Lead     Orders Placed This Encounter   Procedures   • ECG 12 Lead     This order was created via procedure documentation          Plan:       1.  Atrial Fibrillation and Atrial Flutter  Assessment  • The patient has paroxysmal atrial flutter  • This is non-valvular in etiology  • The patient's CHADS2-VASc score is 3  • A XPA1JF1-MCPc score of 2 or more is considered a high risk for a thromboembolic event  • Rivaroxaban prescribed    Plan  • Attempt to maintain sinus rhythm  • Continue rivaroxaban for antithrombotic therapy, bleeding issues discussed  •  Continue beta blocker for rhythm control  • Continue beta blocker for rate control    Subjective - Objective  • The patient had atrial fibrillation ablation   • Overall he's doing well.  He is sinus bradycardia today.  He was taking 12.5 mg of carvedilol, this was decreased down to 6.25 mg when he was in the emergency room.  I'm going to go down to 3.125 mg twice a day because of his bradycardia.  He will follow-up with Anila Spivey on 5/7/2018.  I did discuss with him that he needs to call our office if his weight increases by greater than 3 pounds in 24 hours or 5 pounds in a week, or if he becomes symptomatic again with his breathing.  He indicated that he understood.        As always, it has been a pleasure to participate in your patient's care.      Sincerely,         Lima Hernandez PA-C

## 2018-05-07 ENCOUNTER — OFFICE VISIT (OUTPATIENT)
Dept: CARDIOLOGY | Facility: CLINIC | Age: 68
End: 2018-05-07

## 2018-05-07 VITALS
HEIGHT: 66 IN | SYSTOLIC BLOOD PRESSURE: 124 MMHG | HEART RATE: 42 BPM | DIASTOLIC BLOOD PRESSURE: 88 MMHG | BODY MASS INDEX: 30.86 KG/M2 | WEIGHT: 192 LBS

## 2018-05-07 DIAGNOSIS — I48.0 AF (PAROXYSMAL ATRIAL FIBRILLATION) (HCC): ICD-10-CM

## 2018-05-07 DIAGNOSIS — I25.10 CAD IN NATIVE ARTERY: ICD-10-CM

## 2018-05-07 DIAGNOSIS — Z98.890 HISTORY OF CARDIAC RADIOFREQUENCY ABLATION: ICD-10-CM

## 2018-05-07 DIAGNOSIS — I48.3 TYPICAL ATRIAL FLUTTER (HCC): Primary | ICD-10-CM

## 2018-05-07 DIAGNOSIS — Z95.5 STATUS POST INSERTION OF DRUG-ELUTING STENT INTO LEFT ANTERIOR DESCENDING ARTERY: ICD-10-CM

## 2018-05-07 PROCEDURE — 99214 OFFICE O/P EST MOD 30 MIN: CPT | Performed by: NURSE PRACTITIONER

## 2018-05-07 RX ORDER — CARVEDILOL 6.25 MG/1
6.25 TABLET ORAL 2 TIMES DAILY WITH MEALS
Qty: 30 TABLET | Refills: 0
Start: 2018-05-07 | End: 2018-06-15

## 2018-05-07 NOTE — PROGRESS NOTES
Date of Office Visit: 2018  Encounter Provider: DERIK Devine  Place of Service: [facility] CARDIOLOGY  Patient Name: Vicente Baker  :1950    Chief Complaint   Patient presents with   • Rapid Heart Rate     atrial flutter   :     HPI: Vicente Baker is a 67 y.o. male who is a patient of Dr. Ashby's and Dr. Barrios's with a history of PAF, MI/CAD (stents) and HLD. He the developed persistent atrial flutter. He was started on AC and BB. He see Dr. Barrios and underwent successful CTI dependent flutter on 18. He then called with complaints of brief visual issues but he had not taken his rivaroxaban post procedure due to concerns of bleeding from sites. He spoke to Dr. Barrios, was instructed on the importance of taking the AC and he had no further visual issues. He did present to the ED on 18 with SOA and was noted to be volume overloaded. He was given diuretics and discharged home. He saw Lima Hernandez PA-C on 18 and was doing okay at that time. She decreased his carvedilol to 3.125 BID due to asymptomatic bradycardia. He presents today for routine post procedure follow up.    He is doing okay. He has no chest pain, shortness of breath, PND, orthopnea or edema. He has not had any further visual field issues. He has a an intermittent headache at the base of his skull, right in the center for the last few days. Relived with Tylenol. He is also having problems with his allergies recently. He has not had any dizziness or syncope but some occasional palpitations.     Echo 18:    Interpretation Summary     · Left ventricular wall thickness is consistent with mild concentric hypertrophy.  · Left atrial cavity size is borderline dilated.  · Left ventricular systolic function is normal.  · Estimated EF appears to be in the range of 61 - 65%.           Past Medical History:   Diagnosis Date   • Atrial fibrillation    • Bradycardia    • Coronary artery  disease    • Hyperlipidemia    • Myocardial infarction    • Psoriasis        Past Surgical History:   Procedure Laterality Date   • CARDIAC CATHETERIZATION     • CARDIAC ELECTROPHYSIOLOGY PROCEDURE N/A 4/24/2018    Procedure: Ablation atrial flutter;  Surgeon: Aaron Barrios MD;  Location: [facility] INVASIVE LOCATION;  Service: Cardiovascular   • CORONARY ANGIOPLASTY WITH STENT PLACEMENT     • KNEE ARTHROSCOPY W/ MENISCAL REPAIR Left    • THYROID SURGERY      cyst removal   • TONSILLECTOMY         Social History     Social History   • Marital status:      Spouse name: N/A   • Number of children: N/A   • Years of education: N/A     Occupational History   • Not on file.     Social History Main Topics   • Smoking status: Former Smoker   • Smokeless tobacco: Never Used      Comment: caffeine use   • Alcohol use 1.2 oz/week     2 Glasses of wine per week      Comment: social   • Drug use: No   • Sexual activity: Defer     Other Topics Concern   • Not on file     Social History Narrative   • No narrative on file       Family History   Problem Relation Age of Onset   • No Known Problems Mother    • Esophageal cancer Father    • No Known Problems Sister    • No Known Problems Brother        Review of Systems   Constitution: Negative for chills, fever and malaise/fatigue.   Cardiovascular: Negative for chest pain, dyspnea on exertion, leg swelling, near-syncope, orthopnea, palpitations, paroxysmal nocturnal dyspnea and syncope.   Respiratory: Negative for cough and shortness of breath.    Hematologic/Lymphatic: Negative.    Musculoskeletal: Negative for joint pain, joint swelling and myalgias.   Gastrointestinal: Negative for abdominal pain, diarrhea, melena, nausea and vomiting.   Genitourinary: Negative for frequency and hematuria.   Neurological: Positive for headaches. Negative for light-headedness, numbness, paresthesias and seizures.   Allergic/Immunologic: Negative.    All other systems reviewed and are  "negative.      Allergies   Allergen Reactions   • No Known Drug Allergy          Current Outpatient Prescriptions:   •  ascorbic acid (GNP VITAMIN C) 1000 MG tablet, Take 1,000 mg by mouth Daily., Disp: , Rfl:   •  aspirin 81 MG tablet, Take 1 tablet by mouth Daily., Disp: 30 tablet, Rfl: 11  •  carvedilol (COREG) 6.25 MG tablet, Take 1 tablet by mouth 2 (Two) Times a Day With Meals., Disp: 30 tablet, Rfl: 0  •  cholecalciferol (VITAMIN D3) 1000 units tablet, Take 1,000 Units by mouth Daily., Disp: , Rfl:   •  Coenzyme Q10 200 MG capsule, Take 200 mg by mouth Daily., Disp: , Rfl:   •  rivaroxaban (XARELTO) 20 MG tablet, Take 1 tablet by mouth Daily With Dinner., Disp: 30 tablet, Rfl: 11  •  rosuvastatin (CRESTOR) 40 MG tablet, TAKE 1 TABLET BY MOUTH DAILY, Disp: , Rfl: 3      Objective:     Vitals:    05/07/18 1125   BP: 124/88   Pulse: (!) 42   Weight: 87.1 kg (192 lb)   Height: 167.6 cm (65.98\")     Body mass index is 31 kg/m².    PHYSICAL EXAM:    Vitals Reviewed.   General Appearance: No acute distress, well developed and well nourished.   Eyes: Conjunctiva and lids: No erythema, swelling, or discharge. Sclera non-icteric.   HENT: Atraumatic, normocephalic. External eyes, ears, and nose normal.   Respiratory: No signs of respiratory distress. Respiration rhythm and depth normal.   Clear to auscultation. No rales, crackles, rhonchi, or wheezing auscultated.   Cardiovascular:  Jugular Venous Pressure: Normal  Heart Rate and Rhythm: Normal, Heart Sounds: Normal S1 and S2. No S3 or S4 noted.  Murmurs: No murmurs noted. No rubs, thrills, or gallops.   Arterial Pulses:  Posterior tibialis and dorsalis pedis pulses normal.   Lower Extremities: No edema noted.  Gastrointestinal:  Abdomen soft, non-distended, non-tender.   Musculoskeletal: Normal movement of extremities  Skin and Nails: General appearance normal. No pallor, cyanosis, diaphoresis. Skin temperature normal. No clubbing of fingernails.   Psychiatric: Patient " alert and oriented to person, place, and time. Speech and behavior appropriate. Normal mood and affect.       ECG 12 Lead  Date/Time: 5/8/2018 2:40 PM  Performed by: TERE HAMLIN  Authorized by: TERE HAMLIN   Comparison: compared with previous ECG from 5/1/2018  Similar to previous ECG  Rhythm: sinus bradycardia  BPM: 42  Clinical impression: non-specific ECG  Comments: Non specific T wave abnormality              Assessment:       Diagnosis Plan   1. Typical atrial flutter     2. History of cardiac radiofrequency ablation     3. AF (paroxysmal atrial fibrillation)     4. CAD in native artery     5. Status post insertion of drug-eluting stent into left anterior descending artery            Plan:       1.-3. Atrial Fibrillation and Atrial Flutter  Assessment  • The patient has paroxysmal atrial fibrillation and persistent atrial flutter  • The patient's CHADS2-VASc score is 3  • A LXN2JI3-MXLv score of 2 or more is considered a high risk for a thromboembolic event  • Rivaroxaban prescribed  • SR. No arrhythmias since ablation that he is aware of. He was not symptomatic with his HR being in the 40's and as a matter of fact he says he has always ran a low HR. Will have him go back up to 6.25mg of carvedilol as his b/p has ran a little bit elevated at time. Instructed to call with any issues.     Plan  • Attempt to maintain sinus rhythm  • Continue rivaroxaban for antithrombotic therapy, bleeding issues discussed  • Continue beta blocker for rhythm control    4-5. Coronary Artery Disease  Assessment  • The patient has no angina  • Carvedilol    Subjective - Objective  • There has been a previous stent procedure using DICK  • Current antiplatelet therapy includes aspirin 81 mg      Return to see Dr. Ashby in 4-6 weeks. He will follow up with PCP regarding headaches if they persist, he has appointment on Monday.     As always, it has been a pleasure to participate in your patient's care.      Sincerely,         Tere  DERIK Spivey

## 2018-05-08 PROCEDURE — 93000 ELECTROCARDIOGRAM COMPLETE: CPT | Performed by: NURSE PRACTITIONER

## 2018-06-15 ENCOUNTER — OFFICE VISIT (OUTPATIENT)
Dept: CARDIOLOGY | Facility: CLINIC | Age: 68
End: 2018-06-15

## 2018-06-15 VITALS
SYSTOLIC BLOOD PRESSURE: 140 MMHG | DIASTOLIC BLOOD PRESSURE: 82 MMHG | WEIGHT: 190.4 LBS | HEIGHT: 66 IN | BODY MASS INDEX: 30.6 KG/M2 | HEART RATE: 37 BPM

## 2018-06-15 DIAGNOSIS — I48.0 PAROXYSMAL ATRIAL FIBRILLATION (HCC): ICD-10-CM

## 2018-06-15 DIAGNOSIS — Z98.890 HISTORY OF CARDIAC RADIOFREQUENCY ABLATION: ICD-10-CM

## 2018-06-15 DIAGNOSIS — I25.10 CAD IN NATIVE ARTERY: ICD-10-CM

## 2018-06-15 DIAGNOSIS — I48.3 TYPICAL ATRIAL FLUTTER (HCC): Primary | ICD-10-CM

## 2018-06-15 PROCEDURE — 99214 OFFICE O/P EST MOD 30 MIN: CPT | Performed by: INTERNAL MEDICINE

## 2018-06-15 RX ORDER — CARVEDILOL 3.12 MG/1
3.12 TABLET ORAL 2 TIMES DAILY WITH MEALS
Start: 2018-06-15 | End: 2019-01-16 | Stop reason: HOSPADM

## 2018-06-15 NOTE — PROGRESS NOTES
Date of Office Visit: 2017  Encounter Provider: Kermit Ashby MD  Place of Service: [facility] CARDIOLOGY  Patient Name: Vicente Baker  :1950  [id number]    Chief Complaint   Patient presents with   • Coronary Artery Disease   :     HPI: Vicente Baker is a 67 y.o. male   He is here for a followup.  He has coronary disease and persistent atrial fibrillation that is asymptomatic.   In , we placed a 3.5 x 23 Cypher to his proximal circumflex, a 30 x 8 Cypher in his circumflex.  He really did not have any other significant coronary disease, and he has had normal left ventricular function.  In , he was noted to have asymptomatic atrial fibrillation.  He developed typical atrial flutter and underwent a flutter ablation by Aaron Barrios in April and is done great since then he's feeling well no chest pain shortness of breath PND orthopnea edema    Past Medical History:   Diagnosis Date   • Atrial fibrillation    • Bradycardia    • Coronary artery disease    • Hyperlipidemia    • Myocardial infarction    • Psoriasis        Past Surgical History:   Procedure Laterality Date   • CARDIAC CATHETERIZATION     • CARDIAC ELECTROPHYSIOLOGY PROCEDURE N/A 2018    Procedure: Ablation atrial flutter;  Surgeon: Aaron Barrios MD;  Location: [facility] INVASIVE LOCATION;  Service: Cardiovascular   • CORONARY ANGIOPLASTY WITH STENT PLACEMENT     • KNEE ARTHROSCOPY W/ MENISCAL REPAIR Left    • THYROID SURGERY      cyst removal   • TONSILLECTOMY         Social History     Social History   • Marital status:      Spouse name: N/A   • Number of children: N/A   • Years of education: N/A     Occupational History   • Not on file.     Social History Main Topics   • Smoking status: Former Smoker   • Smokeless tobacco: Never Used      Comment: caffeine use   • Alcohol use 1.2 oz/week     2 Glasses of wine per week      Comment: social   • Drug use: No   • Sexual activity: Defer      Other Topics Concern   • Not on file     Social History Narrative   • No narrative on file       Family History   Problem Relation Age of Onset   • No Known Problems Mother    • Esophageal cancer Father    • No Known Problems Sister    • No Known Problems Brother        Review of Systems   Constitution: Negative for decreased appetite, fever, malaise/fatigue and weight loss.   HENT: Negative for nosebleeds.    Eyes: Negative for double vision.   Cardiovascular: Negative for chest pain, claudication, cyanosis, dyspnea on exertion, irregular heartbeat, leg swelling, near-syncope, orthopnea, palpitations, paroxysmal nocturnal dyspnea and syncope.   Respiratory: Negative for cough, hemoptysis and shortness of breath.    Hematologic/Lymphatic: Negative for bleeding problem.   Skin: Negative for rash.   Musculoskeletal: Negative for falls and myalgias.   Gastrointestinal: Negative for hematochezia, jaundice, melena, nausea and vomiting.   Genitourinary: Negative for hematuria.   Neurological: Negative for dizziness and seizures.   Psychiatric/Behavioral: Negative for altered mental status and memory loss.       Allergies   Allergen Reactions   • No Known Drug Allergy          Current Outpatient Prescriptions:   •  ascorbic acid (GNP VITAMIN C) 1000 MG tablet, Take 1,000 mg by mouth Daily., Disp: , Rfl:   •  aspirin 81 MG tablet, Take 1 tablet by mouth Daily., Disp: 30 tablet, Rfl: 11  •  carvedilol (COREG) 3.125 MG tablet, Take 1 tablet by mouth 2 (Two) Times a Day With Meals., Disp: , Rfl:   •  cholecalciferol (VITAMIN D3) 1000 units tablet, Take 1,000 Units by mouth Daily., Disp: , Rfl:   •  Coenzyme Q10 200 MG capsule, Take 200 mg by mouth Daily., Disp: , Rfl:   •  rivaroxaban (XARELTO) 20 MG tablet, Take 1 tablet by mouth Daily With Dinner., Disp: 30 tablet, Rfl: 11  •  rosuvastatin (CRESTOR) 40 MG tablet, TAKE 1 TABLET BY MOUTH DAILY, Disp: , Rfl: 3     Objective:     Vitals:    06/15/18 0957   BP: 140/82  "  Pulse: (!) 37   Weight: 86.4 kg (190 lb 6.4 oz)   Height: 167.6 cm (66\")     Body mass index is 30.73 kg/m².    Physical Exam   Constitutional: He is oriented to person, place, and time. He appears well-developed and well-nourished.   HENT:   Head: Normocephalic.   Eyes: No scleral icterus.   Neck: No JVD present. No thyromegaly present.   Cardiovascular: Normal rate and normal heart sounds.  An irregularly irregular rhythm present. Exam reveals no gallop and no friction rub.    No murmur heard.  Pulmonary/Chest: Effort normal and breath sounds normal. He has no wheezes. He has no rales.   Abdominal: Soft. There is no hepatosplenomegaly. There is no tenderness.   Musculoskeletal: Normal range of motion. He exhibits no edema.   Lymphadenopathy:     He has no cervical adenopathy.   Neurological: He is alert and oriented to person, place, and time.   Skin: Skin is warm and dry. No rash noted.   Psychiatric: He has a normal mood and affect.       Procedures     Assessment:       Diagnosis Plan   1. Typical atrial flutter     2. History of cardiac radiofrequency ablation     3. CAD in native artery     4. Paroxysmal atrial fibrillation            Plan:        He is doing well. He has no chest pain, shortness of breath.  No coronary symptoms.  He also does not have any symptoms from his atrial fibrillation.   He is really doing well his heart rate is very slow to cut his Coreg in half he's asymptomatic with it and see how he does on that he probably should be on long-term anticoagulation because he has a chads 2 Vascor of 2 and is had paroxysmal A. fib but his flutter should not recur his risk modification otherwise is good 11 see Lima in one year and see me in 2    Coronary Artery Disease  Assessment  • The patient has no angina    Plan  • Lifestyle modifications discussed include adhering to a heart healthy diet, maintenance of a healthy weight, medication compliance, regular exercise and regular monitoring of " cholesterol and blood pressure    Subjective - Objective  • There has been a previous stent procedure using DICK  • Current antiplatelet therapy includes aspirin 81 mg      Atrial Fibrillation and Atrial Flutter  Assessment  • The patient has persistent atrial fibrillation  • This is non-valvular in etiology  • The patient's CHADS2-VASc score is 1  • A STT1OF6-XNAm score of 1 is considered an intermediate risk for a thromboembolic event  • Aspirin prescribed    Plan  • Continue in atrial fibrillation with rate control  • Continue aspirin for antithrombotic therapy, bleeding issues discussed    As always, it has been a pleasure to participate in your patient's care.      Sincerely,       Kermit Ashby MD

## 2019-01-15 ENCOUNTER — APPOINTMENT (OUTPATIENT)
Dept: GENERAL RADIOLOGY | Facility: HOSPITAL | Age: 69
End: 2019-01-15

## 2019-01-15 ENCOUNTER — HOSPITAL ENCOUNTER (OUTPATIENT)
Facility: HOSPITAL | Age: 69
Setting detail: OBSERVATION
Discharge: HOME | End: 2019-01-16
Attending: EMERGENCY MEDICINE | Admitting: INTERNAL MEDICINE

## 2019-01-15 DIAGNOSIS — I48.0 PAROXYSMAL ATRIAL FIBRILLATION (HCC): ICD-10-CM

## 2019-01-15 DIAGNOSIS — I20.8 OTHER FORMS OF ANGINA PECTORIS (HCC): Primary | ICD-10-CM

## 2019-01-15 PROBLEM — R07.9 CHEST PAIN: Status: ACTIVE | Noted: 2019-01-15

## 2019-01-15 LAB
ALBUMIN SERPL-MCNC: 4.2 G/DL (ref 3.5–5.2)
ALBUMIN/GLOB SERPL: 1.7 G/DL
ALP SERPL-CCNC: 74 U/L (ref 39–117)
ALT SERPL W P-5'-P-CCNC: 25 U/L (ref 1–41)
ANION GAP SERPL CALCULATED.3IONS-SCNC: 7.3 MMOL/L
AST SERPL-CCNC: 25 U/L (ref 1–40)
BASOPHILS # BLD AUTO: 0.05 10*3/MM3 (ref 0–0.2)
BASOPHILS NFR BLD AUTO: 0.7 % (ref 0–1.5)
BILIRUB SERPL-MCNC: 0.4 MG/DL (ref 0.1–1.2)
BUN BLD-MCNC: 16 MG/DL (ref 8–23)
BUN/CREAT SERPL: 13.4 (ref 7–25)
CALCIUM SPEC-SCNC: 9.4 MG/DL (ref 8.6–10.5)
CHLORIDE SERPL-SCNC: 101 MMOL/L (ref 98–107)
CO2 SERPL-SCNC: 30.7 MMOL/L (ref 22–29)
CREAT BLD-MCNC: 1.19 MG/DL (ref 0.76–1.27)
DEPRECATED RDW RBC AUTO: 50 FL (ref 37–54)
EOSINOPHIL # BLD AUTO: 0.15 10*3/MM3 (ref 0–0.7)
EOSINOPHIL NFR BLD AUTO: 2.1 % (ref 0.3–6.2)
ERYTHROCYTE [DISTWIDTH] IN BLOOD BY AUTOMATED COUNT: 13.4 % (ref 11.5–14.5)
GFR SERPL CREATININE-BSD FRML MDRD: 61 ML/MIN/1.73
GLOBULIN UR ELPH-MCNC: 2.5 GM/DL
GLUCOSE BLD-MCNC: 119 MG/DL (ref 65–99)
HCT VFR BLD AUTO: 46.8 % (ref 40.4–52.2)
HGB BLD-MCNC: 15.8 G/DL (ref 13.7–17.6)
HOLD SPECIMEN: NORMAL
HOLD SPECIMEN: NORMAL
IMM GRANULOCYTES # BLD AUTO: 0 10*3/MM3 (ref 0–0.03)
IMM GRANULOCYTES NFR BLD AUTO: 0 % (ref 0–0.5)
INR PPP: 1.54 (ref 0.9–1.1)
LYMPHOCYTES # BLD AUTO: 2.19 10*3/MM3 (ref 0.9–4.8)
LYMPHOCYTES NFR BLD AUTO: 30.5 % (ref 19.6–45.3)
MAGNESIUM SERPL-MCNC: 2 MG/DL (ref 1.6–2.4)
MCH RBC QN AUTO: 34.3 PG (ref 27–32.7)
MCHC RBC AUTO-ENTMCNC: 33.8 G/DL (ref 32.6–36.4)
MCV RBC AUTO: 101.5 FL (ref 79.8–96.2)
MONOCYTES # BLD AUTO: 0.65 10*3/MM3 (ref 0.2–1.2)
MONOCYTES NFR BLD AUTO: 9.1 % (ref 5–12)
NEUTROPHILS # BLD AUTO: 4.14 10*3/MM3 (ref 1.9–8.1)
NEUTROPHILS NFR BLD AUTO: 57.6 % (ref 42.7–76)
PLATELET # BLD AUTO: 215 10*3/MM3 (ref 140–500)
PMV BLD AUTO: 10.8 FL (ref 6–12)
POTASSIUM BLD-SCNC: 4.5 MMOL/L (ref 3.5–5.2)
PROT SERPL-MCNC: 6.7 G/DL (ref 6–8.5)
PROTHROMBIN TIME: 18.2 SECONDS (ref 11.7–14.2)
RBC # BLD AUTO: 4.61 10*6/MM3 (ref 4.6–6)
SODIUM BLD-SCNC: 139 MMOL/L (ref 136–145)
TROPONIN T SERPL-MCNC: <0.01 NG/ML (ref 0–0.03)
WBC NRBC COR # BLD: 7.18 10*3/MM3 (ref 4.5–10.7)
WHOLE BLOOD HOLD SPECIMEN: NORMAL
WHOLE BLOOD HOLD SPECIMEN: NORMAL

## 2019-01-15 PROCEDURE — 99284 EMERGENCY DEPT VISIT MOD MDM: CPT

## 2019-01-15 PROCEDURE — 84484 ASSAY OF TROPONIN QUANT: CPT | Performed by: EMERGENCY MEDICINE

## 2019-01-15 PROCEDURE — 85610 PROTHROMBIN TIME: CPT | Performed by: EMERGENCY MEDICINE

## 2019-01-15 PROCEDURE — 71045 X-RAY EXAM CHEST 1 VIEW: CPT

## 2019-01-15 PROCEDURE — G0378 HOSPITAL OBSERVATION PER HR: HCPCS

## 2019-01-15 PROCEDURE — 93010 ELECTROCARDIOGRAM REPORT: CPT | Performed by: INTERNAL MEDICINE

## 2019-01-15 PROCEDURE — 96374 THER/PROPH/DIAG INJ IV PUSH: CPT

## 2019-01-15 PROCEDURE — 85025 COMPLETE CBC W/AUTO DIFF WBC: CPT | Performed by: EMERGENCY MEDICINE

## 2019-01-15 PROCEDURE — 84484 ASSAY OF TROPONIN QUANT: CPT | Performed by: NURSE PRACTITIONER

## 2019-01-15 PROCEDURE — 80053 COMPREHEN METABOLIC PANEL: CPT | Performed by: EMERGENCY MEDICINE

## 2019-01-15 PROCEDURE — 99218 PR INITIAL OBSERVATION CARE/DAY 30 MINUTES: CPT | Performed by: NURSE PRACTITIONER

## 2019-01-15 PROCEDURE — 93005 ELECTROCARDIOGRAM TRACING: CPT

## 2019-01-15 PROCEDURE — 83735 ASSAY OF MAGNESIUM: CPT | Performed by: NURSE PRACTITIONER

## 2019-01-15 PROCEDURE — 36415 COLL VENOUS BLD VENIPUNCTURE: CPT | Performed by: NURSE PRACTITIONER

## 2019-01-15 PROCEDURE — 93005 ELECTROCARDIOGRAM TRACING: CPT | Performed by: EMERGENCY MEDICINE

## 2019-01-15 RX ORDER — ASPIRIN 81 MG/1
81 TABLET ORAL DAILY
Status: DISCONTINUED | OUTPATIENT
Start: 2019-01-15 | End: 2019-01-16 | Stop reason: HOSPADM

## 2019-01-15 RX ORDER — ACETAMINOPHEN 325 MG/1
650 TABLET ORAL EVERY 6 HOURS PRN
Status: DISCONTINUED | OUTPATIENT
Start: 2019-01-15 | End: 2019-01-16 | Stop reason: HOSPADM

## 2019-01-15 RX ORDER — ASPIRIN 81 MG/1
324 TABLET, CHEWABLE ORAL ONCE
Status: DISCONTINUED | OUTPATIENT
Start: 2019-01-15 | End: 2019-01-15

## 2019-01-15 RX ORDER — SODIUM CHLORIDE 0.9 % (FLUSH) 0.9 %
3 SYRINGE (ML) INJECTION EVERY 12 HOURS SCHEDULED
Status: DISCONTINUED | OUTPATIENT
Start: 2019-01-15 | End: 2019-01-16 | Stop reason: HOSPADM

## 2019-01-15 RX ORDER — CARVEDILOL 3.12 MG/1
3.12 TABLET ORAL 2 TIMES DAILY WITH MEALS
Status: DISCONTINUED | OUTPATIENT
Start: 2019-01-15 | End: 2019-01-16

## 2019-01-15 RX ORDER — ROSUVASTATIN CALCIUM 40 MG/1
40 TABLET, COATED ORAL DAILY
Status: DISCONTINUED | OUTPATIENT
Start: 2019-01-15 | End: 2019-01-16 | Stop reason: HOSPADM

## 2019-01-15 RX ORDER — SODIUM CHLORIDE 0.9 % (FLUSH) 0.9 %
10 SYRINGE (ML) INJECTION AS NEEDED
Status: DISCONTINUED | OUTPATIENT
Start: 2019-01-15 | End: 2019-01-16 | Stop reason: HOSPADM

## 2019-01-15 RX ORDER — SODIUM CHLORIDE 0.9 % (FLUSH) 0.9 %
3-10 SYRINGE (ML) INJECTION AS NEEDED
Status: DISCONTINUED | OUTPATIENT
Start: 2019-01-15 | End: 2019-01-16 | Stop reason: HOSPADM

## 2019-01-15 RX ADMIN — CARVEDILOL 3.12 MG: 3.12 TABLET, FILM COATED ORAL at 23:44

## 2019-01-15 RX ADMIN — RIVAROXABAN 20 MG: 20 TABLET, FILM COATED ORAL at 23:45

## 2019-01-15 RX ADMIN — ACETAMINOPHEN 650 MG: 325 TABLET, FILM COATED ORAL at 22:55

## 2019-01-15 RX ADMIN — METOPROLOL TARTRATE 2.5 MG: 1 INJECTION, SOLUTION INTRAVENOUS at 14:11

## 2019-01-15 RX ADMIN — ROSUVASTATIN CALCIUM 40 MG: 40 TABLET, FILM COATED ORAL at 23:45

## 2019-01-15 RX ADMIN — SODIUM CHLORIDE, PRESERVATIVE FREE 3 ML: 5 INJECTION INTRAVENOUS at 21:55

## 2019-01-15 RX ADMIN — NITROGLYCERIN 1 INCH: 20 OINTMENT TOPICAL at 13:16

## 2019-01-15 NOTE — H&P
"[facility]  History & Physical Note                                                                                    Patient Identification:  Vicente Baker:   68 y.o.  male  1950  [id number]            Chief Complaint   Patient presents with   • Chest Pain       Date of Admission: 1/15/19    Admitting Physician: Dr. Ashby    Reason for Admission:Chest pain [R07.9], Chief Complaint   Patient presents with   • Chest Pain       History of Present Illness:      Vicente Baker is a 67 y.o. male patient of Dr. Ashby with history of coronary disease, paroxysmal atrial fibrillation, and Hyperlipidemia.  He has a history 2006 of PCI with 3.5 x 23 Cypher to his proximal circumflex, a 30 x 8 Cypher in his circumflex with normal LVSF.  No other significant CAD noted on cath at that time.  In 2012, he was noted to have asymptomatic atrial fibrillation and later developed typical atrial flutter and underwent a flutter ablation by Dr. Aaron Barrios in April, 2018. He has been anticoagulated on xarelto 20 mg and tolerating that well. He last saw Dr. Ashby 6/15/2018 and was doing well without any chest pain, pnd, shortness of breath, or edema.  12 lead EKG at that visit showed Sinus bradycardia with HR 37.  His carvediolol was decreased from 6.25 mg BID to 3.125 mg BID.      He presented to [facility] ED today with symptoms of shortness of breath, left shoulder/ scapular and left upper chest pain that started about noon. He states this was similar to what he experienced prior to his stents in 2006.  He states he has was feeling ok other than some issues with fatigue with moderate activity up until this Saturday when he was playing pool and drinking coffee with a cardiology friend.  He started feeling lightheaded like he \"might pass out\" and felt more fatigue and had some GI upset with nausea.  His friend took his pulse and noted his heart beat was irregular and had him rest.  His symptoms resolved after a few " "minutes.  Later that night he developed diarrhea with several loose stools and then Sunday he ate some Ramen noodles and became nauseated and vomited.  He then felt a little better up until this afternoon when he went shopping with his wife at [email or web address].  While walking around he again felt some fatigue with shortness of breath and the left shoulder/ scapular and left chest wall pain. Felt like \"something just wasn't right in my chest, had forceful heart beat.\"  Would improve a bit with rest.  His wife became alarmed feeling that his color was \"ashen\" and she convinced him to be evaluated.  He reports missing several doses of his carvedilol and xarelto this week.  Last dose of his xarelto was last night and his carvedilol was this am.      He states looking back he has noticed a decline in his ability to do moderate activity around 11/30/19. 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  He would do a little bit and then have to stop and rest. He thought this was perhaps d/t to issues with lack of sleep.  He has a hard time getting to sleep and staying asleep.  Most recently he has had a stuffy nose and started on flonase and benadryl.  Denies any pseudoephedrine or other decongestants.  Wife reports he is a \"terrible snorer\".  No history of sleep study. He wakes up frequently to urinate last few years at night and has issues getting back to sleep.     On admission to ER he was found to be in AFib with rate 's with Q waves noted in Inferior leads and no significant acute ST / T wave abnormalities noted.  BP was elevated at 149/113 with HR 85.  He was given IV lopressor and started on Nitropaste. He took a 325 mg ASA prior to admission.  BP currently 127/101 with HR 80 and he is chest pain free. CBC showing normal WBC, Plts, and HGb with elevated .5 and MCH 34.3.  CMP shows elevated glucose 119 and elevated CO2 30.7 with other values unremarkable. "     Cardiac Risk Factors: CAD, HLD    Past Medical History:  Past Medical History:   Diagnosis Date   • Atrial fibrillation (CMS/HCC)    • Bradycardia    • Coronary artery disease    • Hyperlipidemia    • Myocardial infarction (CMS/HCC)    • Psoriasis     at least 3 stable    Past Surgical History:  Past Surgical History:   Procedure Laterality Date   • CARDIAC CATHETERIZATION     • CARDIAC ELECTROPHYSIOLOGY PROCEDURE N/A 4/24/2018    Procedure: Ablation atrial flutter;  Surgeon: Aaron Barrios MD;  Location: [facility] INVASIVE LOCATION;  Service: Cardiovascular   • CORONARY ANGIOPLASTY WITH STENT PLACEMENT     • KNEE ARTHROSCOPY W/ MENISCAL REPAIR Left    • THYROID SURGERY      cyst removal   • TONSILLECTOMY          Social History:   Social History     Tobacco Use   • Smoking status: Former Smoker   • Smokeless tobacco: Never Used   • Tobacco comment: caffeine use   Substance Use Topics   • Alcohol use: Yes     Alcohol/week: 1.2 oz     Types: 2 Glasses of wine per week     Comment: social        Family History:  Family History   Problem Relation Age of Onset   • No Known Problems Mother    • Esophageal cancer Father    • No Known Problems Sister    • No Known Problems Brother       Past medical history, past surgical, Family history, social history reviewed     Allergies:  Allergies   Allergen Reactions   • No Known Drug Allergy        Home Meds:  No current facility-administered medications on file prior to encounter.      Current Outpatient Medications on File Prior to Encounter   Medication Sig Dispense Refill   • ascorbic acid (GNP VITAMIN C) 1000 MG tablet Take 1,000 mg by mouth Daily.     • aspirin 81 MG tablet Take 1 tablet by mouth Daily. 30 tablet 11   • carvedilol (COREG) 3.125 MG tablet Take 1 tablet by mouth 2 (Two) Times a Day With Meals.     • cholecalciferol (VITAMIN D3) 1000 units tablet Take 1,000 Units by mouth Daily.     • Coenzyme Q10 200 MG capsule Take 200 mg by mouth Daily.     •  rivaroxaban (XARELTO) 20 MG tablet Take 1 tablet by mouth Daily With Dinner. 30 tablet 11   • rosuvastatin (CRESTOR) 40 MG tablet TAKE 1 TABLET BY MOUTH DAILY  3       Scheduled Meds:    aspirin 324 mg Once     Reviewed home medications and current medications.       INTAKE AND OUTPUT:  No intake or output data in the 24 hours ending 01/15/19 1636        Review of Systems:  Constitutional: + weakness,fatigue; denies fever, chills   Eyes: No eye pain, vision changes   ENT/oropharynx: + epistaxis last week; No difficulty swallowing, no changes in hearing   Cardiovascular: + chest pain, chest tightness, palpitations, dizziness, and near syncope as per HPI; + paroxysmal nocturnal dyspnea, orthopnea, diaphoresis.   Respiratory: + shortness of breath; denies dyspnea on exertion, cough, wheezing hemoptysis   Gastrointestinal: + abdominal pain, nausea, vomiting, diarrhea; denies bloody stools   Genitourinary: No hematuria, dysuria   Neurological: No headache, numbness, tingling, one-sided weakness    Musculoskeletal: No cramps, joint pain   Integument: No rash, edema       Constitutional:  Temp:  [97.4 °F (36.3 °C)] 97.4 °F (36.3 °C)  Heart Rate:  [76-85] 80  Resp:  [16-18] 16  BP: (114-149)/() 127/101   SpO2:  [93 %-99 %] 95 %    Physical Exam:  General:  Appears in no acute distress, appears stated age  Eyes: PERTL, non icteric  HEENT:  No JVD. Thyroid not visibly enlarged. No mucosal pallor or cyanosis  Respiratory: Respirations regular and unlabored at rest. BBS with good air entry in all fields. No crackles, rubs or wheezes auscultated  Cardiovascular: S1S2 irregular irregular rate and rhythm. No murmur, rubs or gallop. No carotid bruits. DP/PT pulses 2+. No lower extremity pitting edema  Gastrointestinal: Abdomen soft, flat, non tender. Bowel sounds present. No hepatosplenomegaly. No ascites  Musculoskeletal: FOSTER x4. No abnormal movements  Extremities: No digital clubbing or cyanosis  Skin: Color pink. Skin warm  and dry to touch. No rashes  No xanthoma  Neuro: AAO x3 CN II-XII grossly intact                         Cardiographics    EC/15/19        1/15/19        6/15/18  At outpt cardiology office visit.           ECHO:    Interpretation Summary 19    · Left ventricular wall thickness is consistent with mild concentric hypertrophy.  · Left atrial cavity size is borderline dilated.  · Left ventricular systolic function is normal.  · Estimated EF appears to be in the range of 61 - 65%.          EP Study 18  Conclusions  1 successful ablation of caval tricuspid isthmus-dependent flutter  2 normal AV node function  3 normal His-Purkinje function  4 no inducible SVT.      Lab Review:  Results from last 7 days   Lab Units  01/15/19   1300   TROPONIN T ng/mL  <0.010         Results from last 7 days   Lab Units  01/15/19   1300   SODIUM mmol/L  139   POTASSIUM mmol/L  4.5   BUN mg/dL  16   CREATININE mg/dL  1.19   CALCIUM mg/dL  9.4       Results from last 7 days   Lab Units  01/15/19   1300   WBC 10*3/mm3  7.18   HEMOGLOBIN g/dL  15.8   HEMATOCRIT %  46.8   PLATELETS 10*3/mm3  215     Results from last 7 days   Lab Units  01/15/19   1300   INR   1.54*         CXR:    Study Result 1/15/19    XR CHEST 1 VW-     HISTORY: Male who is 68 years-old,  chest pain     TECHNIQUE: Frontal view of the chest     COMPARISON: 2018     FINDINGS: Heart, mediastinum and pulmonary vasculature are unremarkable.  No focal pulmonary consolidation, pleural effusion, or pneumothorax.  Pulmonary hyperinflation suggests COPD. No acute osseous process.     IMPRESSION:  No focal pulmonary consolidation. COPD change. Follow-up as  clinical indications persist.          Estimated Creatinine Clearance: 62.9 mL/min (by C-G formula based on SCr of 1.19 mg/dL).      Assessment:   Chest pain: has some typical and atypical features. trop x 1 neg.  Await second trop. No significant acute ST/ T wave abnormalities.  Q waves noted in inferior  "leads.  (q waves noted 6/2018 ekg). Will keep for observation and plan lexiscan stress in am if Trops remain neg. NPO after MN and no caffeine or decaff starting now.   Continue nitropaste, asa, and coreg.     Hx of CAD s/p cypher stent 2006 to circ.: is on xarelto, ASA 81 mg, carvedilol, and crestor.     Parox Afib: currently in afib with controlled VR at rest 80-90's.  Was in sinus 6/2018 with noted bradycardia 37 and coreg reduced.  Is on xarelto 20 mg but did miss 2 doses this week.  Unable to set up for DCCV d/t missed doses of OAC.    Hx of AFlutter: s/p RF ablation 4/2018 by Dr. Barrios.     HTN: BP elevated on presentation to ER. Has improved with IV lopressor and nitropaste.     + snorer: recommend outpt sleep study for evaluation of possible sleep d/o breathing.      Labs/tests ordered in am: BMP, 12 lead EKG in am.     Discussed with Dr. Ashby who agrees with plan as outlined above.     Edna Mariscal, APRN  1/15/2019  4:36 PM    EMR Dragon/Transcription:   \"Dictated utilizing Dragon dictation\".   "

## 2019-01-15 NOTE — ED NOTES
Nursing report ED to floor  Vicente Baker  68 y.o.  male    HPI (triage note):   Chief Complaint   Patient presents with   • Chest Pain       Admitting doctor:   Kermit Ashby MD    Admitting diagnosis:   There were no encounter diagnoses.    Code status:   Current Code Status     This patient does not have a recorded code status. Please follow your organizational policy for patients in this situation.          Allergies:   No known drug allergy    Weight:       01/15/19  1305   Weight: 86.2 kg (190 lb)       Most recent vitals:   Vitals:    01/15/19 1414 01/15/19 1419 01/15/19 1443 01/15/19 1651   BP: (!) 139/101 114/94 (!) 127/101 125/88   Patient Position: Sitting Sitting Sitting    Pulse: 80 76 80 90   Resp:    16   Temp:       TempSrc:       SpO2:  93%  95%   Weight:       Height:           Active LDAs/IV Access:   Lines, Drains & Airways    Active LDAs     Name:   Placement date:   Placement time:   Site:   Days:    Peripheral IV 01/15/19 1259 Left Antecubital   01/15/19    1259    Antecubital   less than 1                Labs (abnormal labs have a star):   Labs Reviewed   COMPREHENSIVE METABOLIC PANEL - Abnormal; Notable for the following components:       Result Value    Glucose 119 (*)     CO2 30.7 (*)     All other components within normal limits   PROTIME-INR - Abnormal; Notable for the following components:    Protime 18.2 (*)     INR 1.54 (*)     All other components within normal limits   CBC WITH AUTO DIFFERENTIAL - Abnormal; Notable for the following components:    .5 (*)     MCH 34.3 (*)     All other components within normal limits   TROPONIN (IN-HOUSE) - Normal    Narrative:     Troponin T Reference Ranges:  Less than 0.03 ng/mL:    Negative for AMI  0.03 to 0.09 ng/mL:      Indeterminant for AMI  Greater than 0.09 ng/mL: Positive for AMI   RAINBOW DRAW    Narrative:     The following orders were created for panel order [place] Draw.  Procedure                               Abnormality          Status                     ---------                               -----------         ------                     Light Blue Top[[id number]]                                   Final result               Green Top (Gel)[[id number]]                                  Final result               Lavender Top[[id number]]                                     Final result               Gold Top - SST[[id number]]                                   Final result                 Please view results for these tests on the individual orders.   TROPONIN (IN-HOUSE)   CBC AND DIFFERENTIAL    Narrative:     The following orders were created for panel order CBC & Differential.  Procedure                               Abnormality         Status                     ---------                               -----------         ------                     CBC Auto Differential[[id number]]        Abnormal            Final result                 Please view results for these tests on the individual orders.   LIGHT BLUE TOP   GREEN TOP   LAVENDER TOP   GOLD TOP - SST       EKG:   ECG 12 Lead         ECG 12 Lead             Meds given in ED:   Medications   sodium chloride 0.9 % flush 10 mL (not administered)   aspirin chewable tablet 324 mg (324 mg Oral Not Given 1/15/19 1300)   nitroglycerin (NITROSTAT) ointment 1 inch (1 inch Topical Given 1/15/19 1316)   metoprolol tartrate (LOPRESSOR) injection 2.5 mg (2.5 mg Intravenous Given 1/15/19 1411)       Imaging results:  Xr Chest 1 View    Result Date: 1/15/2019  No focal pulmonary consolidation. COPD change. Follow-up as clinical indications persist.  This report was finalized on 1/15/2019 1:44 PM by Dr. Ronald Marcelino M.D.        Ambulatory status:   - up with assist    Social issues:   Social History     Socioeconomic History   • Marital status:      Spouse name: Not on file   • Number of children: Not on file   • Years of education: Not on file   • Highest education level: Not on file    Social Needs   • Financial resource strain: Not on file   • Food insecurity - worry: Not on file   • Food insecurity - inability: Not on file   • Transportation needs - medical: Not on file   • Transportation needs - non-medical: Not on file   Occupational History   • Not on file   Tobacco Use   • Smoking status: Former Smoker   • Smokeless tobacco: Never Used   • Tobacco comment: caffeine use   Substance and Sexual Activity   • Alcohol use: Yes     Alcohol/week: 1.2 oz     Types: 2 Glasses of wine per week     Comment: social   • Drug use: No   • Sexual activity: Defer   Other Topics Concern   • Not on file   Social History Narrative   • Not on file        Valeria Mclaughlin, RN  01/15/19 9754

## 2019-01-15 NOTE — ED NOTES
Pt reports SOA, left shoulder and left chest pain starting around noon. Pt reports taking 325mg asa pta. Pt reports pain and SOA have improved since arriving to ED.     Bridgette Coombs RN  01/15/19 8802

## 2019-01-15 NOTE — ED PROVIDER NOTES
EMERGENCY DEPARTMENT ENCOUNTER    CHIEF COMPLAINT  Chief Complaint: Chest Pain  History given by: Patient  History limited by:   Room Number: 13/13  PMD: Betty Schultz MD      HPI:  Pt is a 68 y.o. male who presents complaining of chest pain that began with light exertion today at 1200 and lasted approximately 30 mins . He reports the pain is in the L upper chest and radiated to the L posterior shoulder. He also reports having some SOA with pain. Pt has hx of MI and reports that sxs were similar to his previous MI. Pt took 325mg ASA today and is on Xarelto for his hx of Afib. Pt took last dose yesterday. He reports that he had a similar episode with palpitations 4 days ago while shooting pool and drinking coffee. Pt denies any abd pain, N/V,  sxs. He has had some recent loose stool. Pt rates the pain at 4/10 at maximum and 1/10 currently.    Duration: 30 mins  Onset: sudden  Timing: once  Location: L upper chest  Radiation: L posterior shoulder  Quality: discomfort  Intensity/Severity: 4/10 at maximum, 1/10 currently  Progression: improved  Associated Symptoms: SOA  Aggravating Factors: mild exertion  Alleviating Factors: none  Previous Episodes: hx of MI. Pain was similar  Treatment before arrival: took 325mg ASA.    PAST MEDICAL HISTORY  Active Ambulatory Problems     Diagnosis Date Noted   • CAD in native artery 03/06/2017   • Allergic rhinitis due to pollen 03/07/2018   • Status post insertion of drug-eluting stent into left anterior descending artery 03/07/2018   • Hyperlipidemia LDL goal <70 10/16/2017   • Psoriasis 05/23/2017   • Typical atrial flutter (CMS/HCC) 03/19/2018   • History of cardiac radiofrequency ablation 05/07/2018   • Paroxysmal atrial fibrillation (CMS/HCC) 06/15/2018     Resolved Ambulatory Problems     Diagnosis Date Noted   • No Resolved Ambulatory Problems     Past Medical History:   Diagnosis Date   • Atrial fibrillation (CMS/HCC)    • Bradycardia    • Coronary artery disease     • Hyperlipidemia    • Myocardial infarction (CMS/HCC)    • Psoriasis        PAST SURGICAL HISTORY  Past Surgical History:   Procedure Laterality Date   • CARDIAC CATHETERIZATION     • CARDIAC ELECTROPHYSIOLOGY PROCEDURE N/A 4/24/2018    Procedure: Ablation atrial flutter;  Surgeon: Aaron Barrios MD;  Location: [facility] INVASIVE LOCATION;  Service: Cardiovascular   • CORONARY ANGIOPLASTY WITH STENT PLACEMENT     • KNEE ARTHROSCOPY W/ MENISCAL REPAIR Left    • THYROID SURGERY      cyst removal   • TONSILLECTOMY         FAMILY HISTORY  Family History   Problem Relation Age of Onset   • No Known Problems Mother    • Esophageal cancer Father    • No Known Problems Sister    • No Known Problems Brother        SOCIAL HISTORY  Social History     Socioeconomic History   • Marital status:      Spouse name: Not on file   • Number of children: Not on file   • Years of education: Not on file   • Highest education level: Not on file   Social Needs   • Financial resource strain: Not on file   • Food insecurity - worry: Not on file   • Food insecurity - inability: Not on file   • Transportation needs - medical: Not on file   • Transportation needs - non-medical: Not on file   Occupational History   • Not on file   Tobacco Use   • Smoking status: Former Smoker   • Smokeless tobacco: Never Used   • Tobacco comment: caffeine use   Substance and Sexual Activity   • Alcohol use: Yes     Alcohol/week: 1.2 oz     Types: 2 Glasses of wine per week     Comment: social   • Drug use: No   • Sexual activity: Defer   Other Topics Concern   • Not on file   Social History Narrative   • Not on file       ALLERGIES  No known drug allergy    REVIEW OF SYSTEMS  Review of Systems   Constitutional: Negative for activity change, appetite change and fever.   HENT: Negative for congestion and sore throat.    Eyes: Negative.    Respiratory: Positive for shortness of breath. Negative for cough.    Cardiovascular: Positive for chest pain.  Negative for leg swelling.   Gastrointestinal: Negative for abdominal pain, diarrhea and vomiting.   Endocrine: Negative.    Genitourinary: Negative for decreased urine volume and dysuria.   Musculoskeletal: Negative for neck pain.   Skin: Negative for rash and wound.   Allergic/Immunologic: Negative.    Neurological: Negative for weakness, numbness and headaches.   Hematological: Negative.    Psychiatric/Behavioral: Negative.    All other systems reviewed and are negative.      PHYSICAL EXAM  ED Triage Vitals [01/15/19 1243]   Temp Heart Rate Resp BP SpO2   97.4 °F (36.3 °C) 85 18 -- 99 %      Temp src Heart Rate Source Patient Position BP Location FiO2 (%)   Tympanic Monitor -- -- --       Physical Exam   Constitutional: He is oriented to person, place, and time. No distress.   HENT:   Head: Normocephalic and atraumatic.   Eyes: EOM are normal. Pupils are equal, round, and reactive to light.   Neck: Normal range of motion. Neck supple.   Cardiovascular: Normal rate and normal heart sounds. An irregularly irregular rhythm present.   No murmur heard.  Pulses:       Posterior tibial pulses are 2+ on the right side, and 2+ on the left side.   Pulmonary/Chest: Effort normal and breath sounds normal. No respiratory distress. He has no wheezes.   Abdominal: Soft. Bowel sounds are normal. There is no tenderness. There is no rebound and no guarding.   Musculoskeletal: Normal range of motion. He exhibits no edema.   Neurological: He is alert and oriented to person, place, and time. He has normal sensation and normal strength.   Skin: Skin is warm and dry.   Psychiatric: Mood and affect normal.   Nursing note and vitals reviewed.      LAB RESULTS  Lab Results (last 24 hours)     Procedure Component Value Units Date/Time    CBC & Differential [[id number]] Collected:  01/15/19 1300    Specimen:  Blood Updated:  01/15/19 1322    Narrative:       The following orders were created for panel order CBC & Differential.  Procedure                                Abnormality         Status                     ---------                               -----------         ------                     CBC Auto Differential[[id number]]        Abnormal            Final result                 Please view results for these tests on the individual orders.    Comprehensive Metabolic Panel [[id number]]  (Abnormal) Collected:  01/15/19 1300    Specimen:  Blood Updated:  01/15/19 1345     Glucose 119 mg/dL      BUN 16 mg/dL      Creatinine 1.19 mg/dL      Sodium 139 mmol/L      Potassium 4.5 mmol/L      Chloride 101 mmol/L      CO2 30.7 mmol/L      Calcium 9.4 mg/dL      Total Protein 6.7 g/dL      Albumin 4.20 g/dL      ALT (SGPT) 25 U/L      AST (SGOT) 25 U/L      Alkaline Phosphatase 74 U/L      Total Bilirubin 0.4 mg/dL      eGFR Non African Amer 61 mL/min/1.73      Globulin 2.5 gm/dL      A/G Ratio 1.7 g/dL      BUN/Creatinine Ratio 13.4     Anion Gap 7.3 mmol/L     Troponin [[id number]]  (Normal) Collected:  01/15/19 1300    Specimen:  Blood Updated:  01/15/19 1345     Troponin T <0.010 ng/mL     Narrative:       Troponin T Reference Ranges:  Less than 0.03 ng/mL:    Negative for AMI  0.03 to 0.09 ng/mL:      Indeterminant for AMI  Greater than 0.09 ng/mL: Positive for AMI    Protime-INR [[id number]]  (Abnormal) Collected:  01/15/19 1300    Specimen:  Blood Updated:  01/15/19 1333     Protime 18.2 Seconds      INR 1.54    CBC Auto Differential [[id number]]  (Abnormal) Collected:  01/15/19 1300    Specimen:  Blood Updated:  01/15/19 1322     WBC 7.18 10*3/mm3      RBC 4.61 10*6/mm3      Hemoglobin 15.8 g/dL      Hematocrit 46.8 %      .5 fL      MCH 34.3 pg      MCHC 33.8 g/dL      RDW 13.4 %      RDW-SD 50.0 fl      MPV 10.8 fL      Platelets 215 10*3/mm3      Neutrophil % 57.6 %      Lymphocyte % 30.5 %      Monocyte % 9.1 %      Eosinophil % 2.1 %      Basophil % 0.7 %      Immature Grans % 0.0 %      Neutrophils, Absolute 4.14 10*3/mm3      Lymphocytes,  Absolute 2.19 10*3/mm3      Monocytes, Absolute 0.65 10*3/mm3      Eosinophils, Absolute 0.15 10*3/mm3      Basophils, Absolute 0.05 10*3/mm3      Immature Grans, Absolute 0.00 10*3/mm3           I ordered the above labs and reviewed the results    RADIOLOGY  XR Chest 1 View   Final Result   No focal pulmonary consolidation. COPD change. Follow-up as   clinical indications persist.       This report was finalized on 1/15/2019 1:44 PM by Dr. Ronald Marcelino M.D.               I ordered the above noted radiological studies. Interpreted by radiologist. Reviewed by me in PACS.       PROCEDURES  Procedures  EKG          EKG time: 1250  Rhythm/Rate: Afib, 80s  P waves and NH: absent  QRS, axis: PRWP   ST and T waves: prominent ST segments anterior leads. Nonspecific findings anterior leads.     Interpreted Contemporaneously by me, independently viewed  changed compared to prior - Now in afib and prominent ST findings are new    EKG          EKG time: 1314  Rhythm/Rate: Afib, 80s  P waves and NH: absent  QRS, axis: PRWP   ST and T waves: nonspecific ST/T wave findings anterior and inferior leads     Interpreted Contemporaneously by me, independently viewed  Minimally changed compared to prior        PROGRESS AND CONSULTS      1:06 PM  Ordered blood work, EKG, and CXR. NTG paste and ASA ordered. Consult placed to Dr. Ashby.   2:18 PM  Discussed pt with Dr. Ashby. He will have pt evaluated in the ED.    1610  Pt has been seen and evaluated by Jill (APRN for [facility]). Who accepts for further care    MEDICAL DECISION MAKING  Results were reviewed/discussed with the patient and they were also made aware of online access. Pt also made aware that some labs, such as cultures, will not be resulted during ER visit and follow up with PMD is necessary.     MDM  Number of Diagnoses or Management Options     Amount and/or Complexity of Data Reviewed  Review and summarize past medical records: yes (ECHO 4/2018 showed EF of 60-65%  with LVH. Seen by Dr. Ashby 6/15/18. Hx of CAD with previous 2x stent to proximal circumplex after ablation on 4/2018 by Dr. Barrios. CHADS2-VASC score of 1. No previous stress test in EPIC)           DIAGNOSIS  Final diagnoses:   Other forms of angina pectoris (CMS/HCC)   Paroxysmal atrial fibrillation (CMS/HCC)       DISPOSITION  ADMISSION    Discussed treatment plan and reason for admission with pt/family and admitting physician.  Pt/family voiced understanding of the plan for admission for further testing/treatment as needed.         Latest Documented Vital Signs:  As of 3:48 PM  BP- (!) 127/101 HR- 80 Temp- 97.4 °F (36.3 °C) (Tympanic) O2 sat- 93%    --  Documentation assistance provided by arelis Newman for Dr. Rainey .  Information recorded by the scribe was done at my direction and has been verified and validated by me.       Erick Newman  01/15/19 4291       Berna Rainey MD  01/18/19 0060

## 2019-01-16 ENCOUNTER — APPOINTMENT (OUTPATIENT)
Dept: NUCLEAR MEDICINE | Facility: HOSPITAL | Age: 69
End: 2019-01-16

## 2019-01-16 VITALS
DIASTOLIC BLOOD PRESSURE: 79 MMHG | SYSTOLIC BLOOD PRESSURE: 114 MMHG | OXYGEN SATURATION: 97 % | BODY MASS INDEX: 31.21 KG/M2 | RESPIRATION RATE: 18 BRPM | HEART RATE: 85 BPM | TEMPERATURE: 97.6 F | HEIGHT: 66 IN | WEIGHT: 194.2 LBS

## 2019-01-16 LAB
BH CV STRESS COMMENTS STAGE 1: NORMAL
BH CV STRESS DOSE REGADENOSON STAGE 1: 0.4
BH CV STRESS DURATION MIN STAGE 1: 0
BH CV STRESS DURATION SEC STAGE 1: 10
BH CV STRESS PROTOCOL 1: NORMAL
BH CV STRESS RECOVERY BP: NORMAL MMHG
BH CV STRESS RECOVERY HR: 106 BPM
BH CV STRESS STAGE 1: 1
LV EF NUC BP: 37 %
MAXIMAL PREDICTED HEART RATE: 154 BPM
PERCENT MAX PREDICTED HR: 90.26 %
STRESS BASELINE BP: NORMAL MMHG
STRESS BASELINE HR: 80 BPM
STRESS PERCENT HR: 106 %
STRESS POST PEAK BP: NORMAL MMHG
STRESS POST PEAK HR: 139 BPM
STRESS TARGET HR: 131 BPM

## 2019-01-16 PROCEDURE — 99217 PR OBSERVATION CARE DISCHARGE MANAGEMENT: CPT | Performed by: NURSE PRACTITIONER

## 2019-01-16 PROCEDURE — 99225 PR SBSQ OBSERVATION CARE/DAY 25 MINUTES: CPT | Performed by: INTERNAL MEDICINE

## 2019-01-16 PROCEDURE — 93016 CV STRESS TEST SUPVJ ONLY: CPT | Performed by: INTERNAL MEDICINE

## 2019-01-16 PROCEDURE — G0378 HOSPITAL OBSERVATION PER HR: HCPCS

## 2019-01-16 PROCEDURE — 78452 HT MUSCLE IMAGE SPECT MULT: CPT

## 2019-01-16 PROCEDURE — A9500 TC99M SESTAMIBI: HCPCS | Performed by: INTERNAL MEDICINE

## 2019-01-16 PROCEDURE — 93018 CV STRESS TEST I&R ONLY: CPT | Performed by: INTERNAL MEDICINE

## 2019-01-16 PROCEDURE — 93017 CV STRESS TEST TRACING ONLY: CPT

## 2019-01-16 PROCEDURE — 0 TECHNETIUM SESTAMIBI: Performed by: INTERNAL MEDICINE

## 2019-01-16 PROCEDURE — 78452 HT MUSCLE IMAGE SPECT MULT: CPT | Performed by: INTERNAL MEDICINE

## 2019-01-16 RX ORDER — CARVEDILOL 6.25 MG/1
6.25 TABLET ORAL 2 TIMES DAILY WITH MEALS
Status: ON HOLD
Start: 2019-01-16 | End: 2019-02-28 | Stop reason: SDUPTHER

## 2019-01-16 RX ORDER — CARVEDILOL 6.25 MG/1
6.25 TABLET ORAL 2 TIMES DAILY WITH MEALS
Status: DISCONTINUED | OUTPATIENT
Start: 2019-01-16 | End: 2019-01-16 | Stop reason: HOSPADM

## 2019-01-16 RX ORDER — ECHINACEA PURPUREA EXTRACT 125 MG
1 TABLET ORAL AS NEEDED
Status: DISCONTINUED | OUTPATIENT
Start: 2019-01-16 | End: 2019-01-16 | Stop reason: HOSPADM

## 2019-01-16 RX ADMIN — NITROGLYCERIN 1 INCH: 20 OINTMENT TOPICAL at 02:50

## 2019-01-16 RX ADMIN — SODIUM CHLORIDE, PRESERVATIVE FREE 3 ML: 5 INJECTION INTRAVENOUS at 09:56

## 2019-01-16 RX ADMIN — ASPIRIN 81 MG: 81 TABLET, DELAYED RELEASE ORAL at 09:55

## 2019-01-16 RX ADMIN — TECHNETIUM TC 99M SESTAMIBI 1 DOSE: 1 INJECTION INTRAVENOUS at 06:20

## 2019-01-16 RX ADMIN — CARVEDILOL 3.12 MG: 3.12 TABLET, FILM COATED ORAL at 09:56

## 2019-01-16 NOTE — PROGRESS NOTES
"Vicente Baker  1950 68 y.o.  [id number]      Patient Care Team:  Betty Schultz MD as PCP - General (Internal Medicine)    CC: LION fib    Interval History: Doing okay      Objective   Vital Signs  Temp:  [97.4 °F (36.3 °C)-98.4 °F (36.9 °C)] 97.6 °F (36.4 °C)  Heart Rate:  [] 85  Resp:  [16-18] 18  BP: (106-132)/() 114/79  No intake or output data in the 24 hours ending 01/16/19 1546  Flowsheet Rows      First Filed Value   Admission Height  167.6 cm (66\") Documented at 01/15/2019 1305   Admission Weight  86.2 kg (190 lb) Documented at 01/15/2019 1305          Physical Exam:   General Appearance:    Alert,oriented, in no acute distress   Lungs:     Clear to auscultation,BS are equal    Heart:    Normal S1 and S2, iRRR without murmur, gallop or rub   HEENT:    Sclerae are clear, no JVD or adenopathy   Abdomen:     Normal bowel sounds, soft non-tender, non-distended, no HSM   Extremities:   Moves all extremities well, no edema, no cyanosis, no             Redness, no rash     Medication Review:        aspirin 81 mg Oral Daily   carvedilol 3.125 mg Oral BID With Meals   rivaroxaban 20 mg Oral Daily With Dinner   rosuvastatin 40 mg Oral Daily   sodium chloride 3 mL Intravenous Q12H            I reviewed the patient's new clinical results.  I personally viewed and interpreted the patient's EKG/Telemetry data    Assessment/Plan  Active Hospital Problems    Diagnosis Date Noted   • Chest pain [R07.9] 01/15/2019      Resolved Hospital Problems   No resolved problems to display.       I think the issue here is that he had AFib and has been in it and has felt funny since then. We got a stress test on him today. It does show inferolateral infarct, which we knew. I do not really think that that is new or warrants any other testing. I think we need to keep our eye on the ball and treat his AFib. Now his AFib is complex because he had a flutter ablation, so I am going to ask Aaron Barrios MD, to see him " and help direct us with that, and he has missed a couple of doses of Xarelto recently, so I do not think he is probably a bong we can cardiovert right now.         Kermit Ashby MD  01/16/19  3:46 PM

## 2019-01-16 NOTE — DISCHARGE SUMMARY
DISCHARGE NOTE    Patient Name: Vicente Baker  Age/Sex: 68 y.o. male  : 1950  MRN: [id number]    Date of Discharge:  2019   Date of Admit: 1/15/2019  Encounter Provider: DERIK Devine  Place of Service: [facility] CARDIOLOGY  Patient Care Team:  Betty Schultz MD as PCP - General (Internal Medicine)    Subjective:     Discharge Diagnosis:    Chest pain      Hospital Course:     68 yr old male pt of Dr. Ashby's that has a hx of PAF, MI/CAD (stents) and HLD. He then developed persistent atrial flutter and underwent successful CTI dependent flutter on 18 by Dr. Barrios. He had been doing pretty good until this past weekend when he started to feel poorly, he tired to just relax over the weekend and did feel a little better but had chest pain and shortness of breath similar to what he felt with his prior MI so he came to ED. He was noted to be in afib which he thought he was because he did not feel well and was at a pool tournament and a cardiologist there checked his pulse and thought it afib because it was irregular. His HR has not been excessive but a little on the tachy side. He had 3 neg trops and did have a nuclear stress test today which showed EF 37% (normal by last echo), inferolateral infarct, which was known and Dr. Ashby did not think any further testing was warranted except checking echo. He really feels like his symptoms are due to his afib. Dr. Barrios and I saw his this afternoon. He unfortunately has missed a couple of doses of his AC due to he just forgot to take it. The plan is to dc home, him to just chill out and take it easy, increase carvedilol to 6.25mg BID for now, will do echo as outpt in the office and we will see him in 3 weeks. If still in afib will plan cardioversion. If has recurrent AF after that then will initiate dofetilide.       Vital Signs  Temp:  [97.4 °F (36.3 °C)-98.4 °F (36.9 °C)] 97.6 °F (36.4 °C)  Heart Rate:  [] 85  Resp:  [16-18] 18  BP: (106-132)/() 114/79  No intake or output data in the 24 hours ending 01/16/19 1652    Physical Exam:    General Appearance: No acute distress, well developed and well nourished.   Eyes: Conjunctiva and lids: No erythema, swelling, or discharge. Sclera non-icteric.   HENT: Atraumatic, normocephalic. External eyes, ears, and nose normal.   Respiratory: No signs of respiratory distress. Respiration rhythm and depth normal.   Clear to auscultation. No rales, crackles, rhonchi, or wheezing auscultated.   Cardiovascular:  Jugular Venous Pressure: Normal  Heart Rate and Rhythm: Irregularly, irregular. Heart Sounds: Normal S1 and S2. No S3 or S4 noted.  Murmurs: No murmurs noted. No rubs, thrills, or gallops.   Arterial Pulses: Posterior tibialis and dorsalis pedis pulses normal.   Lower Extremities: No edema noted.  Gastrointestinal:  Abdomen soft, non-distended, non-tender.  Musculoskeletal: Normal movement of extremities  Skin: Warm and dry.   Psychiatric: Patient alert and oriented to person, place, and time. Speech and behavior appropriate. Normal mood and affect.    Labs:   Results from last 7 days   Lab Units 01/15/19  1300   SODIUM mmol/L 139   POTASSIUM mmol/L 4.5   CHLORIDE mmol/L 101   CO2 mmol/L 30.7*   BUN mg/dL 16   CREATININE mg/dL 1.19   GLUCOSE mg/dL 119*   CALCIUM mg/dL 9.4   AST (SGOT) U/L 25   ALT (SGPT) U/L 25     Results from last 7 days   Lab Units 01/15/19  1856 01/15/19  1610 01/15/19  1300   TROPONIN T ng/mL <0.010 <0.010 <0.010     Results from last 7 days   Lab Units 01/15/19  1300   WBC 10*3/mm3 7.18   HEMOGLOBIN g/dL 15.8   HEMATOCRIT % 46.8   PLATELETS 10*3/mm3 215     Results from last 7 days   Lab Units 01/15/19  1300   INR  1.54*     Results from last 7 days   Lab Units 01/15/19  1610   MAGNESIUM mg/dL 2.0                   Discharge Diet:    Dietary  Orders (From admission, onward)    Start     Ordered    01/16/19 0951  Diet Regular; Cardiac  Diet Effective Now     Question Answer Comment   Diet Texture / Consistency Regular    Common Modifiers Cardiac        01/16/19 0950            Activity at Discharge:  instructions given to patient    Discharge Medications     Discharge Medications      Changes to Medications      Instructions Start Date   carvedilol 6.25 MG tablet  Commonly known as:  COREG  What changed:    · medication strength  · how much to take   6.25 mg, Oral, 2 Times Daily With Meals         Continue These Medications      Instructions Start Date   aspirin 81 MG tablet   81 mg, Oral, Daily      cholecalciferol 1000 units tablet  Commonly known as:  VITAMIN D3   1,000 Units, Oral, Daily      Coenzyme Q10 200 MG capsule   200 mg, Oral, Daily      GNP VITAMIN C 1000 MG tablet  Generic drug:  ascorbic acid   1,000 mg, Oral, Daily      MULTIVITAMIN ADULT PO   1 tablet, Oral, Daily      rivaroxaban 20 MG tablet  Commonly known as:  XARELTO   20 mg, Oral, Daily With Dinner      rosuvastatin 40 MG tablet  Commonly known as:  CRESTOR   TAKE 1 TABLET BY MOUTH DAILY             Discharge disposition: home    Follow-up Appointments  Follow-up Information     Betty Schultz MD Follow up.    Specialty:  Internal Medicine  Contact information:  [address]  [facility] [address]  [place] [id number] [phone]             Anila Spivey APRN Follow up in 3 week(s).    Specialty:  Cardiology  Contact information:  [address] [facility] [address]  [place] [id number] [phone]                 Future Appointments   Date Time Provider Department Center   3/15/2019  1:00 PM Kermit Ashby MD [facility] None   6/17/2019 10:30 AM Lima Alvarez PA [facility] None         DERIK Devine  01/16/19  4:52 PM

## 2019-01-16 NOTE — PLAN OF CARE
Problem: Patient Care Overview  Goal: Plan of Care Review  Outcome: Ongoing (interventions implemented as appropriate)   01/16/19 0451   Coping/Psychosocial   Plan of Care Reviewed With patient   Plan of Care Review   Progress improving   OTHER   Outcome Summary No c/o CP this shift. Nitropaste applied x 1. NPO since midnight, no caffeine. Stress test today. Continues in AFIB. Spouse at bedside.       Problem: Cardiac: ACS (Acute Coronary Syndrome) (Adult)  Goal: Signs and Symptoms of Listed Potential Problems Will be Absent, Minimized or Managed (Cardiac: ACS)  Outcome: Ongoing (interventions implemented as appropriate)

## 2019-01-29 ENCOUNTER — HOSPITAL ENCOUNTER (OUTPATIENT)
Dept: CARDIOLOGY | Facility: HOSPITAL | Age: 69
Discharge: HOME | End: 2019-01-29
Admitting: NURSE PRACTITIONER

## 2019-01-29 VITALS
HEART RATE: 81 BPM | SYSTOLIC BLOOD PRESSURE: 118 MMHG | BODY MASS INDEX: 31.18 KG/M2 | DIASTOLIC BLOOD PRESSURE: 82 MMHG | WEIGHT: 194 LBS | HEIGHT: 66 IN

## 2019-01-29 DIAGNOSIS — I48.0 PAROXYSMAL ATRIAL FIBRILLATION (HCC): ICD-10-CM

## 2019-01-29 DIAGNOSIS — I20.8 OTHER FORMS OF ANGINA PECTORIS (HCC): ICD-10-CM

## 2019-01-29 LAB
AORTIC ARCH: 3.3 CM
ASCENDING AORTA: 3.1 CM
BH CV ECHO MEAS - ACS: 1.6 CM
BH CV ECHO MEAS - AO MAX PG (FULL): 1.4 MMHG
BH CV ECHO MEAS - AO MAX PG: 3.5 MMHG
BH CV ECHO MEAS - AO MEAN PG (FULL): 0.72 MMHG
BH CV ECHO MEAS - AO MEAN PG: 2.2 MMHG
BH CV ECHO MEAS - AO ROOT AREA (BSA CORRECTED): 1.7
BH CV ECHO MEAS - AO ROOT AREA: 9.3 CM^2
BH CV ECHO MEAS - AO ROOT DIAM: 3.5 CM
BH CV ECHO MEAS - AO V2 MAX: 93.7 CM/SEC
BH CV ECHO MEAS - AO V2 MEAN: 70.5 CM/SEC
BH CV ECHO MEAS - AO V2 VTI: 18.6 CM
BH CV ECHO MEAS - AVA(I,A): 1.6 CM^2
BH CV ECHO MEAS - AVA(I,D): 1.6 CM^2
BH CV ECHO MEAS - AVA(V,A): 2 CM^2
BH CV ECHO MEAS - AVA(V,D): 2 CM^2
BH CV ECHO MEAS - BSA(HAYCOCK): 2.1 M^2
BH CV ECHO MEAS - BSA: 2 M^2
BH CV ECHO MEAS - BZI_BMI: 31.3 KILOGRAMS/M^2
BH CV ECHO MEAS - BZI_METRIC_HEIGHT: 167.6 CM
BH CV ECHO MEAS - BZI_METRIC_WEIGHT: 88 KG
BH CV ECHO MEAS - EDV(MOD-SP2): 115 ML
BH CV ECHO MEAS - EDV(MOD-SP4): 125 ML
BH CV ECHO MEAS - EDV(TEICH): 81.4 ML
BH CV ECHO MEAS - EF(CUBED): 55.1 %
BH CV ECHO MEAS - EF(MOD-BP): 47 %
BH CV ECHO MEAS - EF(MOD-SP2): 43.5 %
BH CV ECHO MEAS - EF(MOD-SP4): 49.6 %
BH CV ECHO MEAS - EF(TEICH): 47.2 %
BH CV ECHO MEAS - ESV(MOD-SP2): 65 ML
BH CV ECHO MEAS - ESV(MOD-SP4): 63 ML
BH CV ECHO MEAS - ESV(TEICH): 43 ML
BH CV ECHO MEAS - FS: 23.4 %
BH CV ECHO MEAS - IVS/LVPW: 1.1
BH CV ECHO MEAS - IVSD: 0.92 CM
BH CV ECHO MEAS - LAT PEAK E' VEL: 8 CM/SEC
BH CV ECHO MEAS - LV DIASTOLIC VOL/BSA (35-75): 63.3 ML/M^2
BH CV ECHO MEAS - LV MASS(C)D: 115.1 GRAMS
BH CV ECHO MEAS - LV MASS(C)DI: 58.3 GRAMS/M^2
BH CV ECHO MEAS - LV MAX PG: 2.1 MMHG
BH CV ECHO MEAS - LV MEAN PG: 1.5 MMHG
BH CV ECHO MEAS - LV SYSTOLIC VOL/BSA (12-30): 31.9 ML/M^2
BH CV ECHO MEAS - LV V1 MAX: 73.2 CM/SEC
BH CV ECHO MEAS - LV V1 MEAN: 57.9 CM/SEC
BH CV ECHO MEAS - LV V1 VTI: 11.8 CM
BH CV ECHO MEAS - LVIDD: 4.3 CM
BH CV ECHO MEAS - LVIDS: 3.3 CM
BH CV ECHO MEAS - LVLD AP2: 7.1 CM
BH CV ECHO MEAS - LVLD AP4: 8.1 CM
BH CV ECHO MEAS - LVLS AP2: 6.2 CM
BH CV ECHO MEAS - LVLS AP4: 7.3 CM
BH CV ECHO MEAS - LVOT AREA (M): 2.5 CM^2
BH CV ECHO MEAS - LVOT AREA: 2.6 CM^2
BH CV ECHO MEAS - LVOT DIAM: 1.8 CM
BH CV ECHO MEAS - LVPWD: 0.81 CM
BH CV ECHO MEAS - MED PEAK E' VEL: 8 CM/SEC
BH CV ECHO MEAS - MR MAX PG: 18.2 MMHG
BH CV ECHO MEAS - MR MAX VEL: 213.1 CM/SEC
BH CV ECHO MEAS - MV DEC SLOPE: 927.6 CM/SEC^2
BH CV ECHO MEAS - MV DEC TIME: 0.1 SEC
BH CV ECHO MEAS - MV E MAX VEL: 99.4 CM/SEC
BH CV ECHO MEAS - MV MAX PG: 2.7 MMHG
BH CV ECHO MEAS - MV MEAN PG: 1.4 MMHG
BH CV ECHO MEAS - MV P1/2T MAX VEL: 99.9 CM/SEC
BH CV ECHO MEAS - MV P1/2T: 31.6 MSEC
BH CV ECHO MEAS - MV V2 MAX: 81.4 CM/SEC
BH CV ECHO MEAS - MV V2 MEAN: 57.7 CM/SEC
BH CV ECHO MEAS - MV V2 VTI: 26.4 CM
BH CV ECHO MEAS - MVA P1/2T LCG: 2.2 CM^2
BH CV ECHO MEAS - MVA(P1/2T): 7 CM^2
BH CV ECHO MEAS - MVA(VTI): 1.2 CM^2
BH CV ECHO MEAS - PA ACC TIME: 0.16 SEC
BH CV ECHO MEAS - PA MAX PG (FULL): 1.5 MMHG
BH CV ECHO MEAS - PA MAX PG: 2.6 MMHG
BH CV ECHO MEAS - PA PR(ACCEL): 6.1 MMHG
BH CV ECHO MEAS - PA V2 MAX: 80.1 CM/SEC
BH CV ECHO MEAS - PVA(V,A): 1.9 CM^2
BH CV ECHO MEAS - PVA(V,D): 1.9 CM^2
BH CV ECHO MEAS - QP/QS: 0.92
BH CV ECHO MEAS - RAP SYSTOLE: 3 MMHG
BH CV ECHO MEAS - RV MAX PG: 1.1 MMHG
BH CV ECHO MEAS - RV MEAN PG: 0.58 MMHG
BH CV ECHO MEAS - RV V1 MAX: 51.9 CM/SEC
BH CV ECHO MEAS - RV V1 MEAN: 35.4 CM/SEC
BH CV ECHO MEAS - RV V1 VTI: 9.7 CM
BH CV ECHO MEAS - RVOT AREA: 2.9 CM^2
BH CV ECHO MEAS - RVOT DIAM: 1.9 CM
BH CV ECHO MEAS - SI(AO): 88.2 ML/M^2
BH CV ECHO MEAS - SI(CUBED): 21.6 ML/M^2
BH CV ECHO MEAS - SI(LVOT): 15.5 ML/M^2
BH CV ECHO MEAS - SI(MOD-SP2): 25.3 ML/M^2
BH CV ECHO MEAS - SI(MOD-SP4): 31.4 ML/M^2
BH CV ECHO MEAS - SI(TEICH): 19.5 ML/M^2
BH CV ECHO MEAS - SUP REN AO DIAM: 2.1 CM
BH CV ECHO MEAS - SV(AO): 174.1 ML
BH CV ECHO MEAS - SV(CUBED): 42.7 ML
BH CV ECHO MEAS - SV(LVOT): 30.6 ML
BH CV ECHO MEAS - SV(MOD-SP2): 50 ML
BH CV ECHO MEAS - SV(MOD-SP4): 62 ML
BH CV ECHO MEAS - SV(RVOT): 28.3 ML
BH CV ECHO MEAS - SV(TEICH): 38.4 ML
BH CV ECHO MEAS - TAPSE (>1.6): 1.4 CM2
BH CV ECHO MEASUREMENTS AVERAGE E/E' RATIO: 12.43
BH CV XLRA - RV BASE: 3.6 CM
BH CV XLRA - TDI S': 10 CM/SEC
LEFT ATRIUM VOLUME INDEX: 36 ML/M2
STJ: 2.9 CM

## 2019-01-29 PROCEDURE — 25010000002 PERFLUTREN (DEFINITY) 8.476 MG IN SODIUM CHLORIDE 0.9 % 10 ML INJECTION: Performed by: NURSE PRACTITIONER

## 2019-01-29 PROCEDURE — 93306 TTE W/DOPPLER COMPLETE: CPT

## 2019-01-29 PROCEDURE — 93306 TTE W/DOPPLER COMPLETE: CPT | Performed by: INTERNAL MEDICINE

## 2019-01-29 RX ADMIN — PERFLUTREN 1.5 ML: 6.52 INJECTION, SUSPENSION INTRAVENOUS at 11:05

## 2019-02-12 ENCOUNTER — TRANSCRIBE ORDERS (OUTPATIENT)
Dept: CARDIOLOGY | Facility: CLINIC | Age: 69
End: 2019-02-12

## 2019-02-12 ENCOUNTER — OFFICE VISIT (OUTPATIENT)
Dept: CARDIOLOGY | Facility: CLINIC | Age: 69
End: 2019-02-12

## 2019-02-12 ENCOUNTER — LAB (OUTPATIENT)
Dept: LAB | Facility: HOSPITAL | Age: 69
End: 2019-02-12

## 2019-02-12 VITALS
DIASTOLIC BLOOD PRESSURE: 82 MMHG | WEIGHT: 195 LBS | SYSTOLIC BLOOD PRESSURE: 120 MMHG | HEIGHT: 66 IN | HEART RATE: 81 BPM | BODY MASS INDEX: 31.34 KG/M2

## 2019-02-12 DIAGNOSIS — Z13.6 SCREENING FOR ISCHEMIC HEART DISEASE: ICD-10-CM

## 2019-02-12 DIAGNOSIS — Z01.810 PRE-OPERATIVE CARDIOVASCULAR EXAMINATION: ICD-10-CM

## 2019-02-12 DIAGNOSIS — Z98.890 HISTORY OF CARDIAC RADIOFREQUENCY ABLATION: ICD-10-CM

## 2019-02-12 DIAGNOSIS — Z95.5 STATUS POST INSERTION OF DRUG-ELUTING STENT INTO LEFT ANTERIOR DESCENDING ARTERY: ICD-10-CM

## 2019-02-12 DIAGNOSIS — E25.0: ICD-10-CM

## 2019-02-12 DIAGNOSIS — I48.19 PERSISTENT ATRIAL FIBRILLATION (HCC): Primary | ICD-10-CM

## 2019-02-12 DIAGNOSIS — I48.91 ATRIAL FIBRILLATION, UNSPECIFIED TYPE (HCC): ICD-10-CM

## 2019-02-12 DIAGNOSIS — I25.10 CAD IN NATIVE ARTERY: ICD-10-CM

## 2019-02-12 DIAGNOSIS — I48.3 TYPICAL ATRIAL FLUTTER (HCC): ICD-10-CM

## 2019-02-12 DIAGNOSIS — Z01.810 PRE-OPERATIVE CARDIOVASCULAR EXAMINATION: Primary | ICD-10-CM

## 2019-02-12 LAB
ANION GAP SERPL CALCULATED.3IONS-SCNC: 10.9 MMOL/L
BASOPHILS # BLD AUTO: 0.07 10*3/MM3 (ref 0–0.2)
BASOPHILS NFR BLD AUTO: 1.2 % (ref 0–1.5)
BUN BLD-MCNC: 18 MG/DL (ref 8–23)
BUN/CREAT SERPL: 13.8 (ref 7–25)
CALCIUM SPEC-SCNC: 9.9 MG/DL (ref 8.6–10.5)
CHLORIDE SERPL-SCNC: 103 MMOL/L (ref 98–107)
CO2 SERPL-SCNC: 29.1 MMOL/L (ref 22–29)
CREAT BLD-MCNC: 1.3 MG/DL (ref 0.76–1.27)
DEPRECATED RDW RBC AUTO: 46.5 FL (ref 37–54)
EOSINOPHIL # BLD AUTO: 0.12 10*3/MM3 (ref 0–0.4)
EOSINOPHIL NFR BLD AUTO: 2.1 % (ref 0.3–6.2)
ERYTHROCYTE [DISTWIDTH] IN BLOOD BY AUTOMATED COUNT: 13 % (ref 12.3–15.4)
GFR SERPL CREATININE-BSD FRML MDRD: 55 ML/MIN/1.73
GLUCOSE BLD-MCNC: 103 MG/DL (ref 65–99)
HCT VFR BLD AUTO: 47.7 % (ref 37.5–51)
HGB BLD-MCNC: 15.9 G/DL (ref 13–17.7)
IMM GRANULOCYTES # BLD AUTO: 0.03 10*3/MM3 (ref 0–0.05)
IMM GRANULOCYTES NFR BLD AUTO: 0.5 % (ref 0–0.5)
LYMPHOCYTES # BLD AUTO: 1.86 10*3/MM3 (ref 0.7–3.1)
LYMPHOCYTES NFR BLD AUTO: 32.2 % (ref 19.6–45.3)
MCH RBC QN AUTO: 32.7 PG (ref 26.6–33)
MCHC RBC AUTO-ENTMCNC: 33.3 G/DL (ref 31.5–35.7)
MCV RBC AUTO: 98.1 FL (ref 79–97)
MONOCYTES # BLD AUTO: 0.56 10*3/MM3 (ref 0.1–0.9)
MONOCYTES NFR BLD AUTO: 9.7 % (ref 5–12)
NEUTROPHILS # BLD AUTO: 3.14 10*3/MM3 (ref 1.4–7)
NEUTROPHILS NFR BLD AUTO: 54.3 % (ref 42.7–76)
NRBC BLD AUTO-RTO: 0 /100 WBC (ref 0–0)
PLATELET # BLD AUTO: 208 10*3/MM3 (ref 140–450)
PMV BLD AUTO: 10.6 FL (ref 6–12)
POTASSIUM BLD-SCNC: 4.8 MMOL/L (ref 3.5–5.2)
RBC # BLD AUTO: 4.86 10*6/MM3 (ref 4.14–5.8)
SODIUM BLD-SCNC: 143 MMOL/L (ref 136–145)
WBC NRBC COR # BLD: 5.78 10*3/MM3 (ref 3.4–10.8)

## 2019-02-12 PROCEDURE — 80048 BASIC METABOLIC PNL TOTAL CA: CPT

## 2019-02-12 PROCEDURE — 99213 OFFICE O/P EST LOW 20 MIN: CPT | Performed by: NURSE PRACTITIONER

## 2019-02-12 PROCEDURE — 93000 ELECTROCARDIOGRAM COMPLETE: CPT | Performed by: NURSE PRACTITIONER

## 2019-02-12 PROCEDURE — 36415 COLL VENOUS BLD VENIPUNCTURE: CPT

## 2019-02-12 PROCEDURE — 85025 COMPLETE CBC W/AUTO DIFF WBC: CPT

## 2019-02-12 NOTE — H&P (VIEW-ONLY)
Date of Office Visit: 2019  Encounter Provider: DERIK Devine  Place of Service: [facility] CARDIOLOGY  Patient Name: Vicente Baker  :1950    Chief Complaint   Patient presents with   • Atrial Fibrillation   :     HPI: Vicente Baker is a 68 y.o. male who is a patient of Dr. Ashby's and Dr. Barrios's with a history of PAF, MI/CAD (stents) and hyperlipidemia.  He then developed persistent atrial flutter and underwent CTI dependent flutter ablation in 2018.    He was with an episode of atrial fibrillation with rapid response.  He also had some chest pain and had a stress test which was negative but did show that his EF was down, 37%.  It was felt that his symptoms were really due to his A. fib, unfortunately he had missed a couple doses of his anticoagulation so we increased his beta-blocker with plans to follow-up in the office in a few weeks as an outpatient.    He did have a follow-up echo on  which showed his EF improved to 47%.    He remains in afib but just finished a Z-justin for a resp infection. He says he is better but still with some congestion. No chest pain, shortness of breath at rest, PND, orthopnea or edema.  He does not some palpitations and dyspnea if he over exerts himself.    He has not missed any doses of his rivaroxaban.       Past Medical History:   Diagnosis Date   • Atrial fibrillation (CMS/HCC)    • Bradycardia    • Coronary artery disease    • Hyperlipidemia    • Myocardial infarction (CMS/HCC)    • Psoriasis        Past Surgical History:   Procedure Laterality Date   • CARDIAC CATHETERIZATION     • CARDIAC ELECTROPHYSIOLOGY PROCEDURE N/A 2018    Procedure: Ablation atrial flutter;  Surgeon: Aaron Barrios MD;  Location:  INVASIVE LOCATION;  Service: Cardiovascular   • CORONARY ANGIOPLASTY WITH STENT PLACEMENT     • KNEE ARTHROSCOPY W/ MENISCAL REPAIR Left    • THYROID SURGERY      cyst removal   • TONSILLECTOMY          Social History     Socioeconomic History   • Marital status:      Spouse name: Not on file   • Number of children: Not on file   • Years of education: Not on file   • Highest education level: Not on file   Social Needs   • Financial resource strain: Not on file   • Food insecurity - worry: Not on file   • Food insecurity - inability: Not on file   • Transportation needs - medical: Not on file   • Transportation needs - non-medical: Not on file   Occupational History   • Not on file   Tobacco Use   • Smoking status: Former Smoker   • Smokeless tobacco: Never Used   • Tobacco comment: caffeine use   Substance and Sexual Activity   • Alcohol use: Yes     Alcohol/week: 1.2 oz     Types: 2 Glasses of wine per week     Comment: social   • Drug use: No   • Sexual activity: Defer   Other Topics Concern   • Not on file   Social History Narrative   • Not on file       Family History   Problem Relation Age of Onset   • No Known Problems Mother    • Esophageal cancer Father    • No Known Problems Sister    • No Known Problems Brother        Review of Systems   Constitution: Positive for malaise/fatigue. Negative for chills and fever.   Cardiovascular: Positive for dyspnea on exertion and palpitations. Negative for chest pain, leg swelling, near-syncope, orthopnea, paroxysmal nocturnal dyspnea and syncope.   Respiratory: Negative for cough and shortness of breath.    Hematologic/Lymphatic: Negative.    Musculoskeletal: Negative for joint pain, joint swelling and myalgias.   Gastrointestinal: Negative for abdominal pain, diarrhea, melena, nausea and vomiting.   Genitourinary: Negative for frequency and hematuria.   Neurological: Negative for light-headedness, numbness, paresthesias and seizures.   Allergic/Immunologic: Negative.    All other systems reviewed and are negative.      Allergies   Allergen Reactions   • No Known Drug Allergy          Current Outpatient Medications:   •  ascorbic acid (GNP VITAMIN C) 1000  "MG tablet, Take 1,000 mg by mouth Daily., Disp: , Rfl:   •  aspirin 81 MG tablet, Take 1 tablet by mouth Daily., Disp: 30 tablet, Rfl: 11  •  carvedilol (COREG) 6.25 MG tablet, Take 1 tablet by mouth 2 (Two) Times a Day With Meals., Disp: , Rfl:   •  cholecalciferol (VITAMIN D3) 1000 units tablet, Take 1,000 Units by mouth Daily., Disp: , Rfl:   •  Coenzyme Q10 200 MG capsule, Take 200 mg by mouth Daily., Disp: , Rfl:   •  Multiple Vitamins-Minerals (MULTIVITAMIN ADULT PO), Take 1 tablet by mouth Daily., Disp: , Rfl:   •  rivaroxaban (XARELTO) 20 MG tablet, Take 1 tablet by mouth Daily With Dinner., Disp: 30 tablet, Rfl: 11  •  rosuvastatin (CRESTOR) 40 MG tablet, TAKE 1 TABLET BY MOUTH DAILY, Disp: , Rfl: 3      Objective:     Vitals:    02/12/19 1009   BP: 120/82   Pulse: 81   Weight: 88.5 kg (195 lb)   Height: 167.6 cm (65.98\")     Body mass index is 31.49 kg/m².    PHYSICAL EXAM:    Vitals Reviewed.   General Appearance: No acute distress, well developed and well nourished.   Eyes: Conjunctiva and lids: No erythema, swelling, or discharge. Sclera non-icteric.   HENT: Atraumatic, normocephalic. External eyes, ears, and nose normal.   Respiratory: No signs of respiratory distress. Respiration rhythm and depth normal.   Clear but decreased right base.   Cardiovascular:  Jugular Venous Pressure: Normal  Heart Rate and Rhythm: Irregularly, irregular. Heart Sounds: Normal S1 and S2.   Murmurs: No murmurs noted. No rubs, thrills, or gallops.   Arterial Pulses:  Posterior tibialis and dorsalis pedis pulses normal.   Lower Extremities: No edema noted.  Gastrointestinal:  Abdomen soft, non-distended, non-tender.   Musculoskeletal: Normal movement of extremities  Skin and Nails: General appearance normal. No pallor, cyanosis, diaphoresis. Skin temperature normal. No clubbing of fingernails.   Psychiatric: Patient alert and oriented to person, place, and time. Speech and behavior appropriate. Normal mood and affect. "       ECG 12 Lead  Date/Time: 2/12/2019 10:58 AM  Performed by: Anila Spivey APRN  Authorized by: Anila Spivey APRN   Comparison: compared with previous ECG from 1/15/2019  Similar to previous ECG  Rhythm: atrial fibrillation  BPM: 81    Clinical impression: abnormal EKG              Assessment:       Diagnosis Plan   1. Persistent atrial fibrillation (CMS/HCC)  Cardioversion External in Cardiology Department   2. Typical atrial flutter (CMS/HCC)     3. History of cardiac radiofrequency ablation     4. CAD in native artery     5. Status post insertion of drug-eluting stent into left anterior descending artery            Plan:       1.-3. Atrial Fibrillation and Atrial Flutter  Assessment  • The patient has persistent atrial fibrillation  • The patient's CHADS2-VASc score is 3  • A [id number] score of 2 or more is considered a high risk for a thromboembolic event  • Rivaroxaban prescribed  • Persistent AF, HR okay    Has remained in afib, will plan on CV. He just finished a Z-justin for resp infection and is better, will schedule for next Tuesday but have instructed him to call on Monday if he is still congested, if so we will postpone.    If he brownlee not maintain SR after CV then will likely need to try AAD.     Hx of typical flutter, s/p ablation    Plan  • Attempt to maintain sinus rhythm  • Continue rivaroxaban for antithrombotic therapy, bleeding issues discussed  • Continue beta blocker for rhythm control    4.-5. Coronary Artery Disease  Assessment  • The patient has no angina    Subjective - Objective  • There has been a previous stent procedure using DICK  • Current antiplatelet therapy includes aspirin 81 mg      As always, it has been a pleasure to participate in your patient's care.      Sincerely,         DERIK Serrano

## 2019-02-12 NOTE — PROGRESS NOTES
Date of Office Visit: 2019  Encounter Provider: DERIK Devine  Place of Service: [facility] CARDIOLOGY  Patient Name: Vicente Baker  :1950    Chief Complaint   Patient presents with   • Atrial Fibrillation   :     HPI: Vicente Baker is a 68 y.o. male who is a patient of Dr. Ashby's and Dr. Barrios's with a history of PAF, MI/CAD (stents) and hyperlipidemia.  He then developed persistent atrial flutter and underwent CTI dependent flutter ablation in 2018.    He was with an episode of atrial fibrillation with rapid response.  He also had some chest pain and had a stress test which was negative but did show that his EF was down, 37%.  It was felt that his symptoms were really due to his A. fib, unfortunately he had missed a couple doses of his anticoagulation so we increased his beta-blocker with plans to follow-up in the office in a few weeks as an outpatient.    He did have a follow-up echo on  which showed his EF improved to 47%.    He remains in afib but just finished a Z-justin for a resp infection. He says he is better but still with some congestion. No chest pain, shortness of breath at rest, PND, orthopnea or edema.  He does not some palpitations and dyspnea if he over exerts himself.    He has not missed any doses of his rivaroxaban.       Past Medical History:   Diagnosis Date   • Atrial fibrillation (CMS/HCC)    • Bradycardia    • Coronary artery disease    • Hyperlipidemia    • Myocardial infarction (CMS/HCC)    • Psoriasis        Past Surgical History:   Procedure Laterality Date   • CARDIAC CATHETERIZATION     • CARDIAC ELECTROPHYSIOLOGY PROCEDURE N/A 2018    Procedure: Ablation atrial flutter;  Surgeon: Aaron Barrios MD;  Location: [facility] INVASIVE LOCATION;  Service: Cardiovascular   • CORONARY ANGIOPLASTY WITH STENT PLACEMENT     • KNEE ARTHROSCOPY W/ MENISCAL REPAIR Left    • THYROID SURGERY      cyst removal   • TONSILLECTOMY          Social History     Socioeconomic History   • Marital status:      Spouse name: Not on file   • Number of children: Not on file   • Years of education: Not on file   • Highest education level: Not on file   Social Needs   • Financial resource strain: Not on file   • Food insecurity - worry: Not on file   • Food insecurity - inability: Not on file   • Transportation needs - medical: Not on file   • Transportation needs - non-medical: Not on file   Occupational History   • Not on file   Tobacco Use   • Smoking status: Former Smoker   • Smokeless tobacco: Never Used   • Tobacco comment: caffeine use   Substance and Sexual Activity   • Alcohol use: Yes     Alcohol/week: 1.2 oz     Types: 2 Glasses of wine per week     Comment: social   • Drug use: No   • Sexual activity: Defer   Other Topics Concern   • Not on file   Social History Narrative   • Not on file       Family History   Problem Relation Age of Onset   • No Known Problems Mother    • Esophageal cancer Father    • No Known Problems Sister    • No Known Problems Brother        Review of Systems   Constitution: Positive for malaise/fatigue. Negative for chills and fever.   Cardiovascular: Positive for dyspnea on exertion and palpitations. Negative for chest pain, leg swelling, near-syncope, orthopnea, paroxysmal nocturnal dyspnea and syncope.   Respiratory: Negative for cough and shortness of breath.    Hematologic/Lymphatic: Negative.    Musculoskeletal: Negative for joint pain, joint swelling and myalgias.   Gastrointestinal: Negative for abdominal pain, diarrhea, melena, nausea and vomiting.   Genitourinary: Negative for frequency and hematuria.   Neurological: Negative for light-headedness, numbness, paresthesias and seizures.   Allergic/Immunologic: Negative.    All other systems reviewed and are negative.      Allergies   Allergen Reactions   • No Known Drug Allergy          Current Outpatient Medications:   •  ascorbic acid (GNP VITAMIN C) 1000  "MG tablet, Take 1,000 mg by mouth Daily., Disp: , Rfl:   •  aspirin 81 MG tablet, Take 1 tablet by mouth Daily., Disp: 30 tablet, Rfl: 11  •  carvedilol (COREG) 6.25 MG tablet, Take 1 tablet by mouth 2 (Two) Times a Day With Meals., Disp: , Rfl:   •  cholecalciferol (VITAMIN D3) 1000 units tablet, Take 1,000 Units by mouth Daily., Disp: , Rfl:   •  Coenzyme Q10 200 MG capsule, Take 200 mg by mouth Daily., Disp: , Rfl:   •  Multiple Vitamins-Minerals (MULTIVITAMIN ADULT PO), Take 1 tablet by mouth Daily., Disp: , Rfl:   •  rivaroxaban (XARELTO) 20 MG tablet, Take 1 tablet by mouth Daily With Dinner., Disp: 30 tablet, Rfl: 11  •  rosuvastatin (CRESTOR) 40 MG tablet, TAKE 1 TABLET BY MOUTH DAILY, Disp: , Rfl: 3      Objective:     Vitals:    02/12/19 1009   BP: 120/82   Pulse: 81   Weight: 88.5 kg (195 lb)   Height: 167.6 cm (65.98\")     Body mass index is 31.49 kg/m².    PHYSICAL EXAM:    Vitals Reviewed.   General Appearance: No acute distress, well developed and well nourished.   Eyes: Conjunctiva and lids: No erythema, swelling, or discharge. Sclera non-icteric.   HENT: Atraumatic, normocephalic. External eyes, ears, and nose normal.   Respiratory: No signs of respiratory distress. Respiration rhythm and depth normal.   Clear but decreased right base.   Cardiovascular:  Jugular Venous Pressure: Normal  Heart Rate and Rhythm: Irregularly, irregular. Heart Sounds: Normal S1 and S2.   Murmurs: No murmurs noted. No rubs, thrills, or gallops.   Arterial Pulses:  Posterior tibialis and dorsalis pedis pulses normal.   Lower Extremities: No edema noted.  Gastrointestinal:  Abdomen soft, non-distended, non-tender.   Musculoskeletal: Normal movement of extremities  Skin and Nails: General appearance normal. No pallor, cyanosis, diaphoresis. Skin temperature normal. No clubbing of fingernails.   Psychiatric: Patient alert and oriented to person, place, and time. Speech and behavior appropriate. Normal mood and affect. "       ECG 12 Lead  Date/Time: 2/12/2019 10:58 AM  Performed by: Anila Spivey APRN  Authorized by: Anila Spivey APRN   Comparison: compared with previous ECG from 1/15/2019  Similar to previous ECG  Rhythm: atrial fibrillation  BPM: 81    Clinical impression: abnormal EKG              Assessment:       Diagnosis Plan   1. Persistent atrial fibrillation (CMS/HCC)  Cardioversion External in Cardiology Department   2. Typical atrial flutter (CMS/HCC)     3. History of cardiac radiofrequency ablation     4. CAD in native artery     5. Status post insertion of drug-eluting stent into left anterior descending artery            Plan:       1.-3. Atrial Fibrillation and Atrial Flutter  Assessment  • The patient has persistent atrial fibrillation  • The patient's CHADS2-VASc score is 3  • A [id number] score of 2 or more is considered a high risk for a thromboembolic event  • Rivaroxaban prescribed  • Persistent AF, HR okay    Has remained in afib, will plan on CV. He just finished a Z-justin for resp infection and is better, will schedule for next Tuesday but have instructed him to call on Monday if he is still congested, if so we will postpone.    If he brownlee not maintain SR after CV then will likely need to try AAD.     Hx of typical flutter, s/p ablation    Plan  • Attempt to maintain sinus rhythm  • Continue rivaroxaban for antithrombotic therapy, bleeding issues discussed  • Continue beta blocker for rhythm control    4.-5. Coronary Artery Disease  Assessment  • The patient has no angina    Subjective - Objective  • There has been a previous stent procedure using DICK  • Current antiplatelet therapy includes aspirin 81 mg      As always, it has been a pleasure to participate in your patient's care.      Sincerely,         DERIK Serrano

## 2019-02-19 ENCOUNTER — ANESTHESIA EVENT (OUTPATIENT)
Dept: CARDIOLOGY | Facility: HOSPITAL | Age: 69
End: 2019-02-19

## 2019-02-19 ENCOUNTER — APPOINTMENT (OUTPATIENT)
Dept: CT IMAGING | Facility: HOSPITAL | Age: 69
End: 2019-02-19

## 2019-02-19 ENCOUNTER — HOSPITAL ENCOUNTER (EMERGENCY)
Facility: HOSPITAL | Age: 69
Discharge: HOME | End: 2019-02-19
Attending: EMERGENCY MEDICINE | Admitting: EMERGENCY MEDICINE

## 2019-02-19 ENCOUNTER — APPOINTMENT (OUTPATIENT)
Dept: GENERAL RADIOLOGY | Facility: HOSPITAL | Age: 69
End: 2019-02-19

## 2019-02-19 ENCOUNTER — ANESTHESIA (OUTPATIENT)
Dept: CARDIOLOGY | Facility: HOSPITAL | Age: 69
End: 2019-02-19

## 2019-02-19 ENCOUNTER — HOSPITAL ENCOUNTER (OUTPATIENT)
Dept: CARDIOLOGY | Facility: HOSPITAL | Age: 69
Discharge: HOME | End: 2019-02-19
Admitting: INTERNAL MEDICINE

## 2019-02-19 VITALS
WEIGHT: 196 LBS | RESPIRATION RATE: 18 BRPM | SYSTOLIC BLOOD PRESSURE: 115 MMHG | DIASTOLIC BLOOD PRESSURE: 95 MMHG | HEIGHT: 66 IN | HEART RATE: 83 BPM | OXYGEN SATURATION: 95 % | BODY MASS INDEX: 31.5 KG/M2 | TEMPERATURE: 96 F

## 2019-02-19 VITALS
WEIGHT: 196 LBS | OXYGEN SATURATION: 99 % | HEIGHT: 66 IN | RESPIRATION RATE: 16 BRPM | SYSTOLIC BLOOD PRESSURE: 121 MMHG | BODY MASS INDEX: 31.5 KG/M2 | HEART RATE: 51 BPM | TEMPERATURE: 97.7 F | DIASTOLIC BLOOD PRESSURE: 85 MMHG

## 2019-02-19 DIAGNOSIS — I48.19 PERSISTENT ATRIAL FIBRILLATION (HCC): ICD-10-CM

## 2019-02-19 DIAGNOSIS — I48.0 PAROXYSMAL ATRIAL FIBRILLATION (HCC): ICD-10-CM

## 2019-02-19 DIAGNOSIS — R42 DIZZINESS: Primary | ICD-10-CM

## 2019-02-19 LAB
ALBUMIN SERPL-MCNC: 4.2 G/DL (ref 3.5–5.2)
ALBUMIN/GLOB SERPL: 1.7 G/DL
ALP SERPL-CCNC: 66 U/L (ref 39–117)
ALT SERPL W P-5'-P-CCNC: 24 U/L (ref 1–41)
ANION GAP SERPL CALCULATED.3IONS-SCNC: 10.8 MMOL/L
AST SERPL-CCNC: 20 U/L (ref 1–40)
BASOPHILS # BLD AUTO: 0.06 10*3/MM3 (ref 0–0.2)
BASOPHILS NFR BLD AUTO: 1.2 % (ref 0–1.5)
BILIRUB SERPL-MCNC: 0.5 MG/DL (ref 0.1–1.2)
BUN BLD-MCNC: 18 MG/DL (ref 8–23)
BUN/CREAT SERPL: 14.1 (ref 7–25)
CALCIUM SPEC-SCNC: 9.4 MG/DL (ref 8.6–10.5)
CHLORIDE SERPL-SCNC: 104 MMOL/L (ref 98–107)
CO2 SERPL-SCNC: 26.2 MMOL/L (ref 22–29)
CREAT BLD-MCNC: 1.28 MG/DL (ref 0.76–1.27)
DEPRECATED RDW RBC AUTO: 46.8 FL (ref 37–54)
EOSINOPHIL # BLD AUTO: 0.1 10*3/MM3 (ref 0–0.4)
EOSINOPHIL NFR BLD AUTO: 2 % (ref 0.3–6.2)
ERYTHROCYTE [DISTWIDTH] IN BLOOD BY AUTOMATED COUNT: 13.1 % (ref 12.3–15.4)
GFR SERPL CREATININE-BSD FRML MDRD: 56 ML/MIN/1.73
GLOBULIN UR ELPH-MCNC: 2.5 GM/DL
GLUCOSE BLD-MCNC: 129 MG/DL (ref 65–99)
HCT VFR BLD AUTO: 45.4 % (ref 37.5–51)
HGB BLD-MCNC: 15.6 G/DL (ref 13–17.7)
IMM GRANULOCYTES # BLD AUTO: 0.01 10*3/MM3 (ref 0–0.05)
IMM GRANULOCYTES NFR BLD AUTO: 0.2 % (ref 0–0.5)
INR PPP: 1.49 (ref 0.9–1.1)
LYMPHOCYTES # BLD AUTO: 1.3 10*3/MM3 (ref 0.7–3.1)
LYMPHOCYTES NFR BLD AUTO: 26.5 % (ref 19.6–45.3)
MCH RBC QN AUTO: 33.4 PG (ref 26.6–33)
MCHC RBC AUTO-ENTMCNC: 34.4 G/DL (ref 31.5–35.7)
MCV RBC AUTO: 97.2 FL (ref 79–97)
MONOCYTES # BLD AUTO: 0.47 10*3/MM3 (ref 0.1–0.9)
MONOCYTES NFR BLD AUTO: 9.6 % (ref 5–12)
NEUTROPHILS # BLD AUTO: 2.97 10*3/MM3 (ref 1.4–7)
NEUTROPHILS NFR BLD AUTO: 60.5 % (ref 42.7–76)
NRBC BLD AUTO-RTO: 0 /100 WBC (ref 0–0)
NT-PROBNP SERPL-MCNC: 599.9 PG/ML (ref 0–900)
PLATELET # BLD AUTO: 203 10*3/MM3 (ref 140–450)
PMV BLD AUTO: 10.6 FL (ref 6–12)
POTASSIUM BLD-SCNC: 4.2 MMOL/L (ref 3.5–5.2)
PROT SERPL-MCNC: 6.7 G/DL (ref 6–8.5)
PROTHROMBIN TIME: 17.7 SECONDS (ref 11.7–14.2)
RBC # BLD AUTO: 4.67 10*6/MM3 (ref 4.14–5.8)
SODIUM BLD-SCNC: 141 MMOL/L (ref 136–145)
TROPONIN T SERPL-MCNC: <0.01 NG/ML (ref 0–0.03)
WBC NRBC COR # BLD: 4.91 10*3/MM3 (ref 3.4–10.8)

## 2019-02-19 PROCEDURE — 80053 COMPREHEN METABOLIC PANEL: CPT | Performed by: EMERGENCY MEDICINE

## 2019-02-19 PROCEDURE — 93005 ELECTROCARDIOGRAM TRACING: CPT

## 2019-02-19 PROCEDURE — 92960 CARDIOVERSION ELECTRIC EXT: CPT

## 2019-02-19 PROCEDURE — 70450 CT HEAD/BRAIN W/O DYE: CPT

## 2019-02-19 PROCEDURE — 85025 COMPLETE CBC W/AUTO DIFF WBC: CPT | Performed by: EMERGENCY MEDICINE

## 2019-02-19 PROCEDURE — 99284 EMERGENCY DEPT VISIT MOD MDM: CPT

## 2019-02-19 PROCEDURE — 83880 ASSAY OF NATRIURETIC PEPTIDE: CPT | Performed by: EMERGENCY MEDICINE

## 2019-02-19 PROCEDURE — 84484 ASSAY OF TROPONIN QUANT: CPT | Performed by: EMERGENCY MEDICINE

## 2019-02-19 PROCEDURE — 93010 ELECTROCARDIOGRAM REPORT: CPT | Performed by: INTERNAL MEDICINE

## 2019-02-19 PROCEDURE — 71045 X-RAY EXAM CHEST 1 VIEW: CPT

## 2019-02-19 PROCEDURE — 93005 ELECTROCARDIOGRAM TRACING: CPT | Performed by: EMERGENCY MEDICINE

## 2019-02-19 PROCEDURE — 92960 CARDIOVERSION ELECTRIC EXT: CPT | Performed by: INTERNAL MEDICINE

## 2019-02-19 PROCEDURE — 85610 PROTHROMBIN TIME: CPT | Performed by: EMERGENCY MEDICINE

## 2019-02-19 PROCEDURE — 93005 ELECTROCARDIOGRAM TRACING: CPT | Performed by: INTERNAL MEDICINE

## 2019-02-19 RX ORDER — AMPICILLIN TRIHYDRATE 250 MG
1000 CAPSULE ORAL DAILY
COMMUNITY
End: 2019-02-23 | Stop reason: HOSPADM

## 2019-02-19 RX ORDER — SODIUM CHLORIDE 0.9 % (FLUSH) 0.9 %
10 SYRINGE (ML) INJECTION AS NEEDED
Status: DISCONTINUED | OUTPATIENT
Start: 2019-02-19 | End: 2019-02-19 | Stop reason: HOSPADM

## 2019-02-19 RX ORDER — SODIUM CHLORIDE 0.9 % (FLUSH) 0.9 %
3 SYRINGE (ML) INJECTION EVERY 12 HOURS SCHEDULED
Status: DISCONTINUED | OUTPATIENT
Start: 2019-02-19 | End: 2019-02-21 | Stop reason: HOSPADM

## 2019-02-19 RX ORDER — LIDOCAINE HYDROCHLORIDE 10 MG/ML
0.1 INJECTION, SOLUTION EPIDURAL; INFILTRATION; INTRACAUDAL; PERINEURAL ONCE AS NEEDED
Status: DISCONTINUED | OUTPATIENT
Start: 2019-02-19 | End: 2019-02-21 | Stop reason: HOSPADM

## 2019-02-19 RX ORDER — SODIUM CHLORIDE 0.9 % (FLUSH) 0.9 %
3-10 SYRINGE (ML) INJECTION AS NEEDED
Status: DISCONTINUED | OUTPATIENT
Start: 2019-02-19 | End: 2019-02-21 | Stop reason: HOSPADM

## 2019-02-19 RX ORDER — SODIUM CHLORIDE 9 MG/ML
75 INJECTION, SOLUTION INTRAVENOUS CONTINUOUS
Status: DISCONTINUED | OUTPATIENT
Start: 2019-02-19 | End: 2019-02-21 | Stop reason: HOSPADM

## 2019-02-19 RX ADMIN — SODIUM CHLORIDE 75 ML/HR: 9 INJECTION, SOLUTION INTRAVENOUS at 12:19

## 2019-02-19 RX ADMIN — METHOHEXITAL SODIUM 70 MG: 500 INJECTION, POWDER, LYOPHILIZED, FOR SOLUTION INTRAMUSCULAR; INTRAVENOUS; RECTAL at 12:54

## 2019-02-19 NOTE — ED PROVIDER NOTES
EMERGENCY DEPARTMENT ENCOUNTER    CHIEF COMPLAINT  Chief Complaint: heart palpitations  History given by: patient  History limited by: none  Room Number: 06/06  PMD: Betty Schultz MD      HPI:  Pt is a 68 y.o. male who presents complaining of heart palpitations, lightheadedness, and dizziness that started after pt tried to fix his shower head earlier this morning. Pt denies ALEGRE. Pt states he usually takes 1 flomax tablet per day, but was instructed by his PCP to take 2 flomax tablets last night. Pt has hx of irregular heartbeat and has cardioversion procedure scheduled for 1230 later today with Dr. Barrios. Pt states he has not eaten since 0000 last night.     Duration: this morning  Onset: gradual  Timing: constant  Location: heart  Radiation: none  Quality: heart palpitations  Intensity/Severity: moderate  Progression: unchanged  Associated Symptoms: dizziness, lightheadedness  Aggravating Factors: none  Alleviating Factors: none  Previous Episodes: Pt has hx of irregular heartbeat.   Treatment before arrival: Pt states he has not eaten since 0000 last night. Pt took 2 flomax tablets last night.     PAST MEDICAL HISTORY  Active Ambulatory Problems     Diagnosis Date Noted   • CAD in native artery 03/06/2017   • Allergic rhinitis due to pollen 03/07/2018   • Status post insertion of drug-eluting stent into left anterior descending artery 03/07/2018   • Hyperlipidemia LDL goal <70 10/16/2017   • Psoriasis 05/23/2017   • Typical atrial flutter (CMS/HCC) 03/19/2018   • History of cardiac radiofrequency ablation 05/07/2018   • Paroxysmal atrial fibrillation (CMS/HCC) 06/15/2018   • Chest pain 01/15/2019   • Persistent atrial fibrillation (CMS/HCC) 02/12/2019     Resolved Ambulatory Problems     Diagnosis Date Noted   • No Resolved Ambulatory Problems     Past Medical History:   Diagnosis Date   • Atrial fibrillation (CMS/HCC)    • Atrial fibrillation (CMS/HCC)    • Bradycardia    • Coronary artery disease    •  Hyperlipidemia    • Myocardial infarction (CMS/HCC)    • Psoriasis    • Thyroid cyst        PAST SURGICAL HISTORY  Past Surgical History:   Procedure Laterality Date   • CARDIAC CATHETERIZATION     • CARDIAC ELECTROPHYSIOLOGY PROCEDURE N/A 4/24/2018    Procedure: Ablation atrial flutter;  Surgeon: Aaron Barrios MD;  Location: [facility] INVASIVE LOCATION;  Service: Cardiovascular   • CORONARY ANGIOPLASTY WITH STENT PLACEMENT     • KNEE ARTHROSCOPY W/ MENISCAL REPAIR Left    • THYROID SURGERY      cyst removal   • TONSILLECTOMY         FAMILY HISTORY  Family History   Problem Relation Age of Onset   • No Known Problems Mother    • Esophageal cancer Father    • No Known Problems Sister    • No Known Problems Brother        SOCIAL HISTORY  Social History     Socioeconomic History   • Marital status:      Spouse name: Not on file   • Number of children: Not on file   • Years of education: Not on file   • Highest education level: Not on file   Social Needs   • Financial resource strain: Not on file   • Food insecurity - worry: Not on file   • Food insecurity - inability: Not on file   • Transportation needs - medical: Not on file   • Transportation needs - non-medical: Not on file   Occupational History   • Not on file   Tobacco Use   • Smoking status: Former Smoker   • Smokeless tobacco: Never Used   • Tobacco comment: caffeine use   Substance and Sexual Activity   • Alcohol use: Yes     Alcohol/week: 1.2 oz     Types: 2 Glasses of wine per week     Comment: social   • Drug use: No   • Sexual activity: Defer   Other Topics Concern   • Not on file   Social History Narrative   • Not on file       ALLERGIES  No known drug allergy    REVIEW OF SYSTEMS  Review of Systems   Constitutional: Negative for activity change, appetite change and fever.   HENT: Negative for congestion and sore throat.    Eyes: Negative.    Respiratory: Negative for cough and shortness of breath.    Cardiovascular: Positive for palpitations (  irregular heartbeat). Negative for chest pain and leg swelling.   Gastrointestinal: Negative for abdominal pain, diarrhea and vomiting.   Endocrine: Negative.    Genitourinary: Negative for decreased urine volume and dysuria.   Musculoskeletal: Negative for neck pain.   Skin: Negative for rash and wound.   Allergic/Immunologic: Negative.    Neurological: Positive for dizziness and light-headedness. Negative for weakness, numbness and headaches.   Hematological: Negative.    Psychiatric/Behavioral: Negative.    All other systems reviewed and are negative.      PHYSICAL EXAM  ED Triage Vitals [02/19/19 0908]   Temp Heart Rate Resp BP SpO2   96 °F (35.6 °C) 60 16 119/95 99 %      Temp src Heart Rate Source Patient Position BP Location FiO2 (%)   Tympanic Monitor Lying Right arm --       Physical Exam   Constitutional: He is oriented to person, place, and time. No distress.   HENT:   Head: Normocephalic and atraumatic.   Eyes: EOM are normal. Pupils are equal, round, and reactive to light.   Neck: Normal range of motion. Neck supple.   Cardiovascular: Normal rate and normal heart sounds. An irregular rhythm present. Exam reveals no gallop and no friction rub.   No murmur heard.  Pulmonary/Chest: Effort normal and breath sounds normal. No respiratory distress. He has no wheezes. He has no rhonchi. He has no rales.   Abdominal: Soft. There is no tenderness. There is no rebound and no guarding.   Musculoskeletal: Normal range of motion. He exhibits no edema.   Neurological: He is alert and oriented to person, place, and time. He has normal sensation and normal strength.   Skin: Skin is warm and dry.   Psychiatric: Mood and affect normal.   Nursing note and vitals reviewed.      LAB RESULTS  Lab Results (last 24 hours)     Procedure Component Value Units Date/Time    CBC & Differential [[id number]] Collected:  02/19/19 0950    Specimen:  Blood Updated:  02/19/19 1005    Narrative:       The following orders were created for  panel order CBC & Differential.  Procedure                               Abnormality         Status                     ---------                               -----------         ------                     CBC Auto Differential[[id number]]        Abnormal            Final result                 Please view results for these tests on the individual orders.    Comprehensive Metabolic Panel [[id number]]  (Abnormal) Collected:  02/19/19 0950    Specimen:  Blood Updated:  02/19/19 1030     Glucose 129 mg/dL      BUN 18 mg/dL      Creatinine 1.28 mg/dL      Sodium 141 mmol/L      Potassium 4.2 mmol/L      Chloride 104 mmol/L      CO2 26.2 mmol/L      Calcium 9.4 mg/dL      Total Protein 6.7 g/dL      Albumin 4.20 g/dL      ALT (SGPT) 24 U/L      AST (SGOT) 20 U/L      Alkaline Phosphatase 66 U/L      Total Bilirubin 0.5 mg/dL      eGFR Non African Amer 56 mL/min/1.73      Globulin 2.5 gm/dL      A/G Ratio 1.7 g/dL      BUN/Creatinine Ratio 14.1     Anion Gap 10.8 mmol/L     Narrative:       GFR Normal >60  Chronic Kidney Disease <60  Kidney Failure <15    Protime-INR [[id number]]  (Abnormal) Collected:  02/19/19 0950    Specimen:  Blood Updated:  02/19/19 1012     Protime 17.7 Seconds      INR 1.49    BNP [[id number]]  (Normal) Collected:  02/19/19 0950    Specimen:  Blood Updated:  02/19/19 1029     proBNP 599.9 pg/mL     Narrative:       Among patients with dyspnea, NT-proBNP is highly sensitive for the detection of acute congestive heart failure. In addition NT-proBNP of <300 pg/ml effectively rules out acute congestive heart failure with 99% negative predictive value.    Troponin [[id number]]  (Normal) Collected:  02/19/19 0950    Specimen:  Blood Updated:  02/19/19 1030     Troponin T <0.010 ng/mL     Narrative:       Troponin T Reference Range:  <= 0.03 ng/mL-   Negative for AMI  >0.03 ng/mL-     Abnormal for myocardial necrosis.  Clinicians would have to utilize clinical acumen, EKG, Troponin and serial changes to  determine if it is an Acute Myocardial Infarction or myocardial injury due to an underlying chronic condition.     CBC Auto Differential [[id number]]  (Abnormal) Collected:  02/19/19 0950    Specimen:  Blood Updated:  02/19/19 1005     WBC 4.91 10*3/mm3      RBC 4.67 10*6/mm3      Hemoglobin 15.6 g/dL      Hematocrit 45.4 %      MCV 97.2 fL      MCH 33.4 pg      MCHC 34.4 g/dL      RDW 13.1 %      RDW-SD 46.8 fl      MPV 10.6 fL      Platelets 203 10*3/mm3      Neutrophil % 60.5 %      Lymphocyte % 26.5 %      Monocyte % 9.6 %      Eosinophil % 2.0 %      Basophil % 1.2 %      Immature Grans % 0.2 %      Neutrophils, Absolute 2.97 10*3/mm3      Lymphocytes, Absolute 1.30 10*3/mm3      Monocytes, Absolute 0.47 10*3/mm3      Eosinophils, Absolute 0.10 10*3/mm3      Basophils, Absolute 0.06 10*3/mm3      Immature Grans, Absolute 0.01 10*3/mm3      nRBC 0.0 /100 WBC           I ordered the above labs and reviewed the results    RADIOLOGY  XR Chest 1 View      FINDINGS: A single portable view of the chest demonstrates the heart to  be at the upper limits of normal in size. There is no evidence of focal  infiltrate, effusion or congestive failure.      CT Head Without Contrast   Normal      CT Head - normal  CXR - no active disease  I ordered the above noted radiological studies. Interpreted by radiologist. Discussed with radiologist Dr. Gomez. Reviewed by me in PACS.       PROCEDURES  Procedures      PROGRESS AND CONSULTS     0925 CXR, Labs, and Troponin ordered for further evaluation.     1001 CT Head ordered for further evaluation.     1055 [place] [facility] [place]     1105 Received a call from Anila Ospina, a NP for Dr. Barrios, and discussed patient's case. Pt will be discharged and follow up with Dr. Barrios later today for cardioversion.     1110 Rechecked pt. Pt is resting comfortably. Notified pt of unremarkable lab results and imaging studies. Discussed the plan to discharge the pt. I instructed the pt to keep his  appointment with Dr. Barrios for cardioversion later today.  Pt understands and agrees with the plan, all questions answered.      MEDICAL DECISION MAKING  Results were reviewed/discussed with the patient and they were also made aware of online access. Pt also made aware that some labs, such as cultures, will not be resulted during ER visit and follow up with PMD is necessary.     MDM  Number of Diagnoses or Management Options     Amount and/or Complexity of Data Reviewed  Clinical lab tests: ordered and reviewed (INR - 1.49)  Tests in the radiology section of CPT®: ordered and reviewed (CT Head- normal  CXR - no active disease)  Discussion of test results with the performing providers: yes (Dr. Gomez (Radiologist))  Discuss the patient with other providers: yes (Anila Ospina (NP for Dr. Barrios))  Independent visualization of images, tracings, or specimens: yes           DIAGNOSIS  Final diagnoses:   Dizziness   Paroxysmal atrial fibrillation (CMS/HCC)       DISPOSITION  DISCHARGE    Patient discharged in stable condition.    Reviewed implications of results, diagnosis, meds, responsibility to follow up, warning signs and symptoms of possible worsening, potential complications and reasons to return to ER.    Patient/Family voiced understanding of above instructions.    Discussed plan for discharge, as there is no emergent indication for admission. Patient referred to primary care provider for BP management due to today's BP. Pt/family is agreeable and understands need for follow up and repeat testing.  Pt is aware that discharge does not mean that nothing is wrong but it indicates no emergency is present that requires admission and they must continue care with follow-up as given below or physician of their choice.     FOLLOW-UP  Aaron Barrios MD  [address] [facility] [address]  [facility] [id number] [phone]      keep appointment for Cardioversion today    Betty Schultz MD  [address] [facility] [address]  [place]  [place] [address]  [phone]    Schedule an appointment as soon as possible for a visit   if dizziness continues         Medication List      No changes were made to your prescriptions during this visit.           Latest Documented Vital Signs:  As of 11:17 AM  BP- 115/95 HR- 60 Temp- 96 °F (35.6 °C) (Tympanic) O2 sat- 95%    --  Documentation assistance provided by arelis Dugan for Dr. Saldana.  Information recorded by the scribe was done at my direction and has been verified and validated by me.     Michael Dugan  02/19/19 6634       Moris Saldana MD  02/19/19 7169

## 2019-02-19 NOTE — ED TRIAGE NOTES
"Pt reports breaking out in a cold sweat, is dizzy, and feels \"like my hearts not doing right.\" pt feels like he is in a fib. Pt schedule for cardioversion today at 1230. Pt has not spoken with cardio yet.  "

## 2019-02-19 NOTE — DISCHARGE INSTRUCTIONS
Moderate Conscious Sedation, Adult, Care After  Refer to this sheet in the next few weeks. These instructions provide you with information on caring for yourself after your procedure. Your health care provider may also give you more specific instructions. Your treatment has been planned according to current medical practices, but problems sometimes occur. Call your health care provider if you have any problems or questions after your procedure.  WHAT TO EXPECT AFTER THE PROCEDURE   After your procedure:  · You may feel sleepy, clumsy, and have poor balance for several hours.  · Vomiting may occur if you eat too soon after the procedure.  HOME CARE INSTRUCTIONS  · Do not participate in any activities where you could become injured for at least 24 hours. Do not:    Drive.    Swim.    Ride a bicycle.    Operate heavy machinery.    Cook.    Use power tools.    Climb ladders.    Work from a high place.  · Do not make important decisions or sign legal documents until you are improved.  · If you vomit, drink water, juice, or soup when you can drink without vomiting. Make sure you have little or no nausea before eating solid foods.  · Only take over-the-counter or prescription medicines for pain, discomfort, or fever as directed by your health care provider.  · Make sure you and your family fully understand everything about the medicines given to you, including what side effects may occur.  · You should not drink alcohol, take sleeping pills, or take medicines that cause drowsiness for at least 24 hours.  · If you smoke, do not smoke without supervision.  · If you are feeling better, you may resume normal activities 24 hours after you were sedated.  · Keep all appointments with your health care provider.  · You should have a responsible adult stay with you for the first 24 hours post procedure.  SEEK MEDICAL CARE IF:  · Your skin is pale or bluish in color.  · You continue to feel nauseous or vomit.  · Your pain is getting  worse and is not helped by medicine.  · You have bleeding or swelling.  · You are still sleepy or feeling clumsy after 24 hours.  SEEK IMMEDIATE MEDICAL CARE IF:  · You develop a rash.  · You have difficulty breathing.  · You develop any type of allergic problem.  · You have a fever.  MAKE SURE YOU:  · Understand these instructions.  · Will watch your condition.  · Will get help right away if you are not doing well or get worse.     This information is not intended to replace advice given to you by your health care provider. Make sure you discuss any questions you have with your health care provider.     Document Released: 10/08/2014 Document Revised: 01/08/2016 Document Reviewed: 10/08/2014  [employer] Interactive Patient Education ©2016 [employer]

## 2019-02-19 NOTE — INTERVAL H&P NOTE
H&P reviewed. The patient was examined and there are no changes to the H&P.  He was in the emergency room this morning he came in because what sounds like a vasovagal episode.  He was in the shower and then he had a leak in the shower and he started working on it and took a shower had a part and then started feeling hot sweaty nauseated and almost syncopal.  He then came to the ER.  He was in A. fib and all of his labs were okay.  He was discharged from the ER and sent up here for a cardioversion.

## 2019-02-19 NOTE — ED NOTES
Called cath lab. Pt to transfer to cath lab for procedure he has scheduled today     Valeria Mclaughlin RN  02/19/19 [id number]

## 2019-02-19 NOTE — ANESTHESIA PREPROCEDURE EVALUATION
Anesthesia Evaluation     Patient summary reviewed and Nursing notes reviewed                Airway   Mallampati: III  Dental      Pulmonary    (+) a smoker Former,   Cardiovascular     ECG reviewed  Rhythm: irregular  Rate: abnormal    (+) past MI , CAD, cardiac stents dysrhythmias Atrial Fib, Atrial Flutter, hyperlipidemia,       Neuro/Psych- negative ROS  GI/Hepatic/Renal/Endo    (+) obesity,       Musculoskeletal (-) negative ROS    Abdominal    Substance History - negative use     OB/GYN negative ob/gyn ROS         Other                        Anesthesia Plan    ASA 3     general     intravenous induction   Anesthetic plan, all risks, benefits, and alternatives have been provided, discussed and informed consent has been obtained with: patient.

## 2019-02-22 ENCOUNTER — APPOINTMENT (OUTPATIENT)
Dept: GENERAL RADIOLOGY | Facility: HOSPITAL | Age: 69
End: 2019-02-22

## 2019-02-22 ENCOUNTER — APPOINTMENT (OUTPATIENT)
Dept: CT IMAGING | Facility: HOSPITAL | Age: 69
End: 2019-02-22

## 2019-02-22 ENCOUNTER — HOSPITAL ENCOUNTER (OUTPATIENT)
Facility: HOSPITAL | Age: 69
Setting detail: OBSERVATION
Discharge: HOME | End: 2019-02-23
Attending: EMERGENCY MEDICINE | Admitting: EMERGENCY MEDICINE

## 2019-02-22 DIAGNOSIS — R79.89 ELEVATED LACTIC ACID LEVEL: Primary | ICD-10-CM

## 2019-02-22 DIAGNOSIS — R53.1 GENERALIZED WEAKNESS: ICD-10-CM

## 2019-02-22 DIAGNOSIS — Z78.9 ALCOHOL USE: ICD-10-CM

## 2019-02-22 DIAGNOSIS — A41.9 SEPSIS, DUE TO UNSPECIFIED ORGANISM: ICD-10-CM

## 2019-02-22 PROBLEM — E87.20 LACTIC ACIDOSIS: Status: ACTIVE | Noted: 2019-02-22

## 2019-02-22 PROBLEM — I48.91 ATRIAL FIBRILLATION (HCC): Status: ACTIVE | Noted: 2018-06-15

## 2019-02-22 LAB
ALBUMIN SERPL-MCNC: 4.4 G/DL (ref 3.5–5.2)
ALBUMIN/GLOB SERPL: 1.8 G/DL
ALP SERPL-CCNC: 72 U/L (ref 39–117)
ALT SERPL W P-5'-P-CCNC: 32 U/L (ref 1–41)
ANION GAP SERPL CALCULATED.3IONS-SCNC: 19.8 MMOL/L
AST SERPL-CCNC: 37 U/L (ref 1–40)
BACTERIA UR QL AUTO: NORMAL /HPF
BASOPHILS # BLD AUTO: 0.08 10*3/MM3 (ref 0–0.2)
BASOPHILS NFR BLD AUTO: 0.7 % (ref 0–1.5)
BILIRUB SERPL-MCNC: 0.7 MG/DL (ref 0.1–1.2)
BILIRUB UR QL STRIP: NEGATIVE
BUN BLD-MCNC: 23 MG/DL (ref 8–23)
BUN/CREAT SERPL: 21.5 (ref 7–25)
CALCIUM SPEC-SCNC: 9.1 MG/DL (ref 8.6–10.5)
CHLORIDE SERPL-SCNC: 104 MMOL/L (ref 98–107)
CLARITY UR: CLEAR
CO2 SERPL-SCNC: 19.2 MMOL/L (ref 22–29)
COLOR UR: YELLOW
CREAT BLD-MCNC: 1.07 MG/DL (ref 0.76–1.27)
D-LACTATE SERPL-SCNC: 1.2 MMOL/L (ref 0.5–2)
D-LACTATE SERPL-SCNC: 2.2 MMOL/L (ref 0.5–2)
D-LACTATE SERPL-SCNC: 4.3 MMOL/L (ref 0.5–2)
DEPRECATED RDW RBC AUTO: 49.7 FL (ref 37–54)
EOSINOPHIL # BLD AUTO: 0.05 10*3/MM3 (ref 0–0.4)
EOSINOPHIL NFR BLD AUTO: 0.4 % (ref 0.3–6.2)
ERYTHROCYTE [DISTWIDTH] IN BLOOD BY AUTOMATED COUNT: 13.4 % (ref 12.3–15.4)
ETHANOL BLD-MCNC: 47 MG/DL (ref 0–10)
ETHANOL UR QL: 0.05 %
GFR SERPL CREATININE-BSD FRML MDRD: 69 ML/MIN/1.73
GLOBULIN UR ELPH-MCNC: 2.5 GM/DL
GLUCOSE BLD-MCNC: 56 MG/DL (ref 65–99)
GLUCOSE BLDC GLUCOMTR-MCNC: 188 MG/DL (ref 70–130)
GLUCOSE UR STRIP-MCNC: ABNORMAL MG/DL
HCT VFR BLD AUTO: 43.4 % (ref 37.5–51)
HGB BLD-MCNC: 14 G/DL (ref 13–17.7)
HGB UR QL STRIP.AUTO: ABNORMAL
HOLD SPECIMEN: NORMAL
HYALINE CASTS UR QL AUTO: NORMAL /LPF
IMM GRANULOCYTES # BLD AUTO: 0.07 10*3/MM3 (ref 0–0.05)
IMM GRANULOCYTES NFR BLD AUTO: 0.6 % (ref 0–0.5)
INR PPP: 2.39 (ref 0.9–1.1)
KETONES UR QL STRIP: ABNORMAL
LEUKOCYTE ESTERASE UR QL STRIP.AUTO: NEGATIVE
LYMPHOCYTES # BLD AUTO: 1.46 10*3/MM3 (ref 0.7–3.1)
LYMPHOCYTES NFR BLD AUTO: 12.8 % (ref 19.6–45.3)
MCH RBC QN AUTO: 32.6 PG (ref 26.6–33)
MCHC RBC AUTO-ENTMCNC: 32.3 G/DL (ref 31.5–35.7)
MCV RBC AUTO: 100.9 FL (ref 79–97)
MONOCYTES # BLD AUTO: 0.37 10*3/MM3 (ref 0.1–0.9)
MONOCYTES NFR BLD AUTO: 3.3 % (ref 5–12)
NEUTROPHILS # BLD AUTO: 9.34 10*3/MM3 (ref 1.4–7)
NEUTROPHILS NFR BLD AUTO: 82.2 % (ref 42.7–76)
NITRITE UR QL STRIP: NEGATIVE
NRBC BLD AUTO-RTO: 0 /100 WBC (ref 0–0)
PH UR STRIP.AUTO: <=5 [PH] (ref 5–8)
PLATELET # BLD AUTO: 205 10*3/MM3 (ref 140–450)
PMV BLD AUTO: 11.5 FL (ref 6–12)
POTASSIUM BLD-SCNC: 3.9 MMOL/L (ref 3.5–5.2)
PROCALCITONIN SERPL-MCNC: 0.07 NG/ML (ref 0.1–0.25)
PROT SERPL-MCNC: 6.9 G/DL (ref 6–8.5)
PROT UR QL STRIP: NEGATIVE
PROTHROMBIN TIME: 25.7 SECONDS (ref 11.7–14.2)
RBC # BLD AUTO: 4.3 10*6/MM3 (ref 4.14–5.8)
RBC # UR: NORMAL /HPF
REF LAB TEST METHOD: NORMAL
SODIUM BLD-SCNC: 143 MMOL/L (ref 136–145)
SP GR UR STRIP: >=1.03 (ref 1–1.03)
SQUAMOUS #/AREA URNS HPF: NORMAL /HPF
TROPONIN T SERPL-MCNC: <0.01 NG/ML (ref 0–0.03)
UROBILINOGEN UR QL STRIP: ABNORMAL
WBC NRBC COR # BLD: 11.37 10*3/MM3 (ref 3.4–10.8)
WBC UR QL AUTO: NORMAL /HPF

## 2019-02-22 PROCEDURE — 93010 ELECTROCARDIOGRAM REPORT: CPT | Performed by: INTERNAL MEDICINE

## 2019-02-22 PROCEDURE — 25010000002 PIPERACILLIN SOD-TAZOBACTAM PER 1 G: Performed by: EMERGENCY MEDICINE

## 2019-02-22 PROCEDURE — 84484 ASSAY OF TROPONIN QUANT: CPT | Performed by: EMERGENCY MEDICINE

## 2019-02-22 PROCEDURE — G0378 HOSPITAL OBSERVATION PER HR: HCPCS

## 2019-02-22 PROCEDURE — 84145 PROCALCITONIN (PCT): CPT | Performed by: EMERGENCY MEDICINE

## 2019-02-22 PROCEDURE — 83605 ASSAY OF LACTIC ACID: CPT | Performed by: EMERGENCY MEDICINE

## 2019-02-22 PROCEDURE — 85025 COMPLETE CBC W/AUTO DIFF WBC: CPT | Performed by: EMERGENCY MEDICINE

## 2019-02-22 PROCEDURE — 80307 DRUG TEST PRSMV CHEM ANLYZR: CPT | Performed by: EMERGENCY MEDICINE

## 2019-02-22 PROCEDURE — 87040 BLOOD CULTURE FOR BACTERIA: CPT | Performed by: EMERGENCY MEDICINE

## 2019-02-22 PROCEDURE — 71045 X-RAY EXAM CHEST 1 VIEW: CPT

## 2019-02-22 PROCEDURE — 36415 COLL VENOUS BLD VENIPUNCTURE: CPT | Performed by: EMERGENCY MEDICINE

## 2019-02-22 PROCEDURE — 74177 CT ABD & PELVIS W/CONTRAST: CPT

## 2019-02-22 PROCEDURE — 83605 ASSAY OF LACTIC ACID: CPT | Performed by: HOSPITALIST

## 2019-02-22 PROCEDURE — 80053 COMPREHEN METABOLIC PANEL: CPT | Performed by: EMERGENCY MEDICINE

## 2019-02-22 PROCEDURE — 85610 PROTHROMBIN TIME: CPT | Performed by: EMERGENCY MEDICINE

## 2019-02-22 PROCEDURE — 96365 THER/PROPH/DIAG IV INF INIT: CPT

## 2019-02-22 PROCEDURE — 25010000002 IOPAMIDOL 61 % SOLUTION: Performed by: EMERGENCY MEDICINE

## 2019-02-22 PROCEDURE — 96366 THER/PROPH/DIAG IV INF ADDON: CPT

## 2019-02-22 PROCEDURE — 96375 TX/PRO/DX INJ NEW DRUG ADDON: CPT

## 2019-02-22 PROCEDURE — 99285 EMERGENCY DEPT VISIT HI MDM: CPT

## 2019-02-22 PROCEDURE — 96367 TX/PROPH/DG ADDL SEQ IV INF: CPT

## 2019-02-22 PROCEDURE — 81001 URINALYSIS AUTO W/SCOPE: CPT | Performed by: EMERGENCY MEDICINE

## 2019-02-22 PROCEDURE — 87147 CULTURE TYPE IMMUNOLOGIC: CPT | Performed by: EMERGENCY MEDICINE

## 2019-02-22 PROCEDURE — 70450 CT HEAD/BRAIN W/O DYE: CPT

## 2019-02-22 PROCEDURE — 82962 GLUCOSE BLOOD TEST: CPT

## 2019-02-22 PROCEDURE — 87150 DNA/RNA AMPLIFIED PROBE: CPT | Performed by: EMERGENCY MEDICINE

## 2019-02-22 PROCEDURE — 93005 ELECTROCARDIOGRAM TRACING: CPT | Performed by: EMERGENCY MEDICINE

## 2019-02-22 PROCEDURE — 96361 HYDRATE IV INFUSION ADD-ON: CPT

## 2019-02-22 PROCEDURE — 25010000002 VANCOMYCIN 10 G RECONSTITUTED SOLUTION: Performed by: EMERGENCY MEDICINE

## 2019-02-22 RX ORDER — MULTIVIT WITH MINERALS/LUTEIN
1000 TABLET ORAL DAILY
COMMUNITY

## 2019-02-22 RX ORDER — LORAZEPAM 2 MG/ML
1 INJECTION INTRAMUSCULAR
Status: DISCONTINUED | OUTPATIENT
Start: 2019-02-22 | End: 2019-02-23 | Stop reason: HOSPADM

## 2019-02-22 RX ORDER — LORAZEPAM 0.5 MG/1
0.5 TABLET ORAL
Status: DISCONTINUED | OUTPATIENT
Start: 2019-02-22 | End: 2019-02-23 | Stop reason: HOSPADM

## 2019-02-22 RX ORDER — LORAZEPAM 2 MG/ML
0.5 INJECTION INTRAMUSCULAR
Status: DISCONTINUED | OUTPATIENT
Start: 2019-02-22 | End: 2019-02-23 | Stop reason: HOSPADM

## 2019-02-22 RX ORDER — LORAZEPAM 1 MG/1
1 TABLET ORAL
Status: DISCONTINUED | OUTPATIENT
Start: 2019-02-22 | End: 2019-02-23 | Stop reason: HOSPADM

## 2019-02-22 RX ORDER — SODIUM CHLORIDE 0.9 % (FLUSH) 0.9 %
3 SYRINGE (ML) INJECTION EVERY 12 HOURS SCHEDULED
Status: DISCONTINUED | OUTPATIENT
Start: 2019-02-22 | End: 2019-02-23 | Stop reason: HOSPADM

## 2019-02-22 RX ORDER — SODIUM CHLORIDE 0.9 % (FLUSH) 0.9 %
3-10 SYRINGE (ML) INJECTION AS NEEDED
Status: DISCONTINUED | OUTPATIENT
Start: 2019-02-22 | End: 2019-02-23 | Stop reason: HOSPADM

## 2019-02-22 RX ORDER — ROSUVASTATIN CALCIUM 40 MG/1
40 TABLET, COATED ORAL DAILY
Status: ON HOLD | COMMUNITY
End: 2019-05-13

## 2019-02-22 RX ORDER — MULTIVIT WITH MINERALS/LUTEIN
1 TABLET ORAL DAILY
COMMUNITY

## 2019-02-22 RX ORDER — ONDANSETRON 2 MG/ML
4 INJECTION INTRAMUSCULAR; INTRAVENOUS EVERY 6 HOURS PRN
Status: DISCONTINUED | OUTPATIENT
Start: 2019-02-22 | End: 2019-02-23 | Stop reason: HOSPADM

## 2019-02-22 RX ORDER — CARVEDILOL 6.25 MG/1
6.25 TABLET ORAL 2 TIMES DAILY WITH MEALS
Status: DISCONTINUED | OUTPATIENT
Start: 2019-02-22 | End: 2019-02-23 | Stop reason: HOSPADM

## 2019-02-22 RX ORDER — DEXTROSE MONOHYDRATE 25 G/50ML
25 INJECTION, SOLUTION INTRAVENOUS ONCE
Status: COMPLETED | OUTPATIENT
Start: 2019-02-22 | End: 2019-02-22

## 2019-02-22 RX ORDER — ROSUVASTATIN CALCIUM 40 MG/1
40 TABLET, COATED ORAL DAILY
Status: DISCONTINUED | OUTPATIENT
Start: 2019-02-22 | End: 2019-02-23 | Stop reason: HOSPADM

## 2019-02-22 RX ORDER — TAMSULOSIN HYDROCHLORIDE 0.4 MG/1
1 CAPSULE ORAL DAILY
COMMUNITY
End: 2019-02-26

## 2019-02-22 RX ORDER — NITROGLYCERIN 0.4 MG/1
0.4 TABLET SUBLINGUAL
Status: DISCONTINUED | OUTPATIENT
Start: 2019-02-22 | End: 2019-02-23 | Stop reason: HOSPADM

## 2019-02-22 RX ORDER — SODIUM CHLORIDE 9 MG/ML
125 INJECTION, SOLUTION INTRAVENOUS CONTINUOUS
Status: DISCONTINUED | OUTPATIENT
Start: 2019-02-22 | End: 2019-02-23

## 2019-02-22 RX ORDER — ASPIRIN 81 MG/1
81 TABLET ORAL DAILY
Status: DISCONTINUED | OUTPATIENT
Start: 2019-02-22 | End: 2019-02-23 | Stop reason: HOSPADM

## 2019-02-22 RX ORDER — ACETAMINOPHEN 325 MG/1
650 TABLET ORAL EVERY 6 HOURS PRN
Status: DISCONTINUED | OUTPATIENT
Start: 2019-02-22 | End: 2019-02-23 | Stop reason: HOSPADM

## 2019-02-22 RX ORDER — FAMOTIDINE 40 MG/1
40 TABLET, FILM COATED ORAL 2 TIMES DAILY
Status: DISCONTINUED | OUTPATIENT
Start: 2019-02-22 | End: 2019-02-23 | Stop reason: HOSPADM

## 2019-02-22 RX ADMIN — SODIUM CHLORIDE 1000 ML: 9 INJECTION, SOLUTION INTRAVENOUS at 12:04

## 2019-02-22 RX ADMIN — SODIUM CHLORIDE 1000 ML: 9 INJECTION, SOLUTION INTRAVENOUS at 13:41

## 2019-02-22 RX ADMIN — CARVEDILOL 6.25 MG: 6.25 TABLET, FILM COATED ORAL at 20:33

## 2019-02-22 RX ADMIN — ASPIRIN 81 MG: 81 TABLET, DELAYED RELEASE ORAL at 20:33

## 2019-02-22 RX ADMIN — SODIUM CHLORIDE 125 ML/HR: 9 INJECTION, SOLUTION INTRAVENOUS at 17:05

## 2019-02-22 RX ADMIN — TAZOBACTAM SODIUM AND PIPERACILLIN SODIUM 3.38 G: 375; 3 INJECTION, SOLUTION INTRAVENOUS at 13:15

## 2019-02-22 RX ADMIN — RIVAROXABAN 20 MG: 20 TABLET, FILM COATED ORAL at 20:33

## 2019-02-22 RX ADMIN — FAMOTIDINE 40 MG: 40 TABLET, FILM COATED ORAL at 20:33

## 2019-02-22 RX ADMIN — DEXTROSE MONOHYDRATE 25 G: 25 INJECTION, SOLUTION INTRAVENOUS at 12:58

## 2019-02-22 RX ADMIN — IOPAMIDOL 85 ML: 612 INJECTION, SOLUTION INTRAVENOUS at 13:06

## 2019-02-22 RX ADMIN — SODIUM CHLORIDE, PRESERVATIVE FREE 3 ML: 5 INJECTION INTRAVENOUS at 20:32

## 2019-02-22 RX ADMIN — ROSUVASTATIN CALCIUM 40 MG: 40 TABLET, FILM COATED ORAL at 20:33

## 2019-02-22 RX ADMIN — VANCOMYCIN HYDROCHLORIDE 1750 MG: 10 INJECTION, POWDER, LYOPHILIZED, FOR SOLUTION INTRAVENOUS at 13:40

## 2019-02-22 NOTE — ED TRIAGE NOTES
Patient was seen at [facility] last week, he was cardioverted due to A-fib with RVR. Patient today woke up not feeling well, states that he has no energy and is having fatigue, patient is also hypotensive.

## 2019-02-22 NOTE — H&P
Patient Name:  Vicente Baker  YOB: 1950  MRN:  [id number]  Admit Date:  2/22/2019  Patient Care Team:  Betty Schultz MD as PCP - General (Internal Medicine)      Chief Complaint   Patient presents with   • Weakness - Generalized   • Fatigue     Subjective   Mr. Baker is a 68 y.o. former smoker with a history of PAF, history of MI with stents, CAD, HLD that presents to [facility] complaining of generalized weakness and disorientation onset this morning when he woke up.  He states that he was diaphoretic with a dry mouth.  Upon standing he was lightheaded and extremely weak.  He also felt disoriented or mildly confused which has resolved. The patient is c/o indigestion and reflux which is new in the last few weeks. Yesterday, pt only drank one glass of water but had  coffee and wine. He denies vision changes, changes in speech, headache, focal weakness, syncope, neck pain, fever, chills, cough, chest pain, palpitations, SOA, abdominal pain, change in bowel or bladder.  He is on Xarelto and underwent cardioversion for atrial fibrillation on 2/12/19.  He did have an upper respiratory infection and completed a course of Zithromax over 2 weeks ago. He was recently started on flomax. Patient's most recent echo on 1/29 showed an EF of 47%.   He drinks wine on occasion and states he had 2 glasses last night.     Workup in the ER including chest x-ray, CT abdomen pelvis, and CT head demonstrates no acute process.  Labs demonstrate lactic acidosis 4.3, WBC 11.37, normal pro-Lauri, urine with ketones but no UTI, EtOH 47. BP upon arrival to ED 85/45 with HR 55.  Given Zosyn and vanc in the ER.     History of Present Illness    Past Medical History:   Diagnosis Date   • Atrial fibrillation (CMS/HCC)    • Atrial fibrillation (CMS/HCC)    • Bradycardia    • Coronary artery disease    • Hyperlipidemia    • Myocardial infarction (CMS/HCC)    • Psoriasis    • Thyroid cyst     ptcalls it a  thyroid glossal cyst     Past Surgical History:   Procedure Laterality Date   • CARDIAC CATHETERIZATION     • CARDIAC ELECTROPHYSIOLOGY PROCEDURE N/A 4/24/2018    Procedure: Ablation atrial flutter;  Surgeon: Aaron Barrios MD;  Location: [facility] INVASIVE LOC[facility];  Service: Cardiovascular   • CORONARY ANGIOPLASTY WITH STENT PLACEMENT     • KNEE ARTHROSCOPY W/ MENISCAL REPAIR Left    • THYROID SURGERY      cyst removal   • TONSILLECTOMY       Family History   Problem Relation Age of Onset   • No Known Problems Mother    • Esophageal cancer Father    • No Known Problems Sister    • No Known Problems Brother      Social History     Tobacco Use   • Smoking status: Former Smoker     Packs/day: 0.50     Years: 37.00     Pack years: 18.50     Types: Cigarettes   • Smokeless tobacco: Never Used   • Tobacco comment: caffeine use   Substance Use Topics   • Alcohol use: No     Alcohol/week: 1.2 oz     Types: 2 Glasses of wine per week     Frequency: Never     Comment: social   • Drug use: Defer       (Not in a hospital admission)  Allergies:    Allergies   Allergen Reactions   • No Known Drug Allergy        Review of Systems   Constitutional: Positive for appetite change and diaphoresis. Negative for activity change, fatigue, fever and unexpected weight change.   HENT: Negative.  Negative for trouble swallowing.         Dry mouth   Eyes: Negative.    Respiratory: Negative for cough, choking, chest tightness and shortness of breath.    Cardiovascular: Negative for chest pain.   Gastrointestinal: Negative for abdominal distention, abdominal pain, blood in stool, constipation, diarrhea, nausea and vomiting.   Endocrine: Negative.    Genitourinary: Positive for difficulty urinating.   Musculoskeletal: Negative.  Negative for back pain, neck pain and neck stiffness.   Skin: Positive for pallor.   Neurological: Positive for dizziness, weakness and light-headedness. Negative for syncope, speech difficulty and headaches.    Hematological: Bruises/bleeds easily.   Psychiatric/Behavioral: Positive for confusion.      Objective    Vital Signs  Temp:  [96.2 °F (35.7 °C)-98 °F (36.7 °C)] 98 °F (36.7 °C)  Heart Rate:  [55-75] 75  Resp:  [14-18] 18  BP: ()/(45-80) 128/80  SpO2:  [97 %-100 %] 97 %  on   ;      Body mass index is 32.01 kg/m².    Physical Exam   Constitutional: He is oriented to person, place, and time. He appears well-developed and well-nourished.   HENT:   Head: Normocephalic and atraumatic.   Eyes: Conjunctivae and EOM are normal.   Neck: Normal range of motion. No tracheal deviation present.   Cardiovascular: Normal rate and regular rhythm.   Pulmonary/Chest: Effort normal and breath sounds normal. No respiratory distress.   Abdominal: Soft. Bowel sounds are normal. He exhibits no distension and no mass. There is no tenderness. There is no rebound and no guarding.   Musculoskeletal: Normal range of motion.   Neurological: He is alert and oriented to person, place, and time.   Skin: Skin is warm and dry.   Psychiatric: He has a normal mood and affect. Judgment normal.   Nursing note and vitals reviewed.    Results Review:  I reviewed the patient's new clinical results.  I reviewed the patient's new imaging results and agree with the interpretation.  I reviewed the patient's other test results and agree with the interpretation  I personally viewed and interpreted the patient's EKG/Telemetry data  Discussed with ED provider.    Lab Results (last 24 hours)     Procedure Component Value Units Date/Time    CBC & Differential [[id number]] Collected:  02/22/19 1144    Specimen:  Blood Updated:  02/22/19 1238    Narrative:       The following orders were created for panel order CBC & Differential.  Procedure                               Abnormality         Status                     ---------                               -----------         ------                     CBC Auto Differential[[id number]]        Abnormal             Final result                 Please view results for these tests on the individual orders.    Comprehensive Metabolic Panel [[id number]]  (Abnormal) Collected:  02/22/19 1144    Specimen:  Blood Updated:  02/22/19 1222     Glucose 56 mg/dL      BUN 23 mg/dL      Creatinine 1.07 mg/dL      Sodium 143 mmol/L      Potassium 3.9 mmol/L      Chloride 104 mmol/L      CO2 19.2 mmol/L      Calcium 9.1 mg/dL      Total Protein 6.9 g/dL      Albumin 4.40 g/dL      ALT (SGPT) 32 U/L      AST (SGOT) 37 U/L      Alkaline Phosphatase 72 U/L      Total Bilirubin 0.7 mg/dL      eGFR Non African Amer 69 mL/min/1.73      Globulin 2.5 gm/dL      A/G Ratio 1.8 g/dL      BUN/Creatinine Ratio 21.5     Anion Gap 19.8 mmol/L     Narrative:       GFR Normal >60  Chronic Kidney Disease <60  Kidney Failure <15    Protime-INR [[id number]]  (Abnormal) Collected:  02/22/19 1144    Specimen:  Blood Updated:  02/22/19 1238     Protime 25.7 Seconds      INR 2.39    Troponin [[id number]]  (Normal) Collected:  02/22/19 1144    Specimen:  Blood Updated:  02/22/19 1221     Troponin T <0.010 ng/mL     Narrative:       Troponin T Reference Range:  <= 0.03 ng/mL-   Negative for AMI  >0.03 ng/mL-     Abnormal for myocardial necrosis.  Clinicians would have to utilize clinical acumen, EKG, Troponin and serial changes to determine if it is an Acute Myocardial Infarction or myocardial injury due to an underlying chronic condition.     CBC Auto Differential [[id number]]  (Abnormal) Collected:  02/22/19 1144    Specimen:  Blood Updated:  02/22/19 1238     WBC 11.37 10*3/mm3      RBC 4.30 10*6/mm3      Hemoglobin 14.0 g/dL      Hematocrit 43.4 %      .9 fL      MCH 32.6 pg      MCHC 32.3 g/dL      RDW 13.4 %      RDW-SD 49.7 fl      MPV 11.5 fL      Platelets 205 10*3/mm3      Neutrophil % 82.2 %      Lymphocyte % 12.8 %      Monocyte % 3.3 %      Eosinophil % 0.4 %      Basophil % 0.7 %      Immature Grans % 0.6 %      Neutrophils, Absolute 9.34 10*3/mm3   "    Lymphocytes, Absolute 1.46 10*3/mm3      Monocytes, Absolute 0.37 10*3/mm3      Eosinophils, Absolute 0.05 10*3/mm3      Basophils, Absolute 0.08 10*3/mm3      Immature Grans, Absolute 0.07 10*3/mm3      nRBC 0.0 /100 WBC     Procalcitonin [[id number]]  (Abnormal) Collected:  02/22/19 1144    Specimen:  Blood Updated:  02/22/19 1322     Procalcitonin 0.07 ng/mL     Narrative:       As a Marker for Sepsis (Non-Neonates):   1. <0.5 ng/mL represents a low risk of severe sepsis and/or septic shock.  1. >2 ng/mL represents a high risk of severe sepsis and/or septic shock.    As a Marker for Lower Respiratory Tract Infections that require antibiotic therapy:  PCT on Admission     Antibiotic Therapy             6-12 Hrs later  > 0.5                Strongly Recommended            >0.25 - <0.5         Recommended  0.1 - 0.25           Discouraged                   Remeasure/reassess PCT  <0.1                 Strongly Discouraged          Remeasure/reassess PCT      As 28 day mortality risk marker: \"Change in Procalcitonin Result\" (> 80 % or <=80 %) if Day 0 (or Day 1) and Day 4 values are available. Refer to http://www.Giga[employer]-pct-calculator.com/   Change in PCT <=80 %   A decrease of PCT levels below or equal to 80 % defines a positive change in PCT test result representing a higher risk for 28-day all-cause mortality of patients diagnosed with severe sepsis or septic shock.  Change in PCT > 80 %   A decrease of PCT levels of more than 80 % defines a negative change in PCT result representing a lower risk for 28-day all-cause mortality of patients diagnosed with severe sepsis or septic shock.                Ethanol [[id number]]  (Abnormal) Collected:  02/22/19 1144    Specimen:  Blood Updated:  02/22/19 1346     Ethanol 47 mg/dL      Ethanol % 0.047 %     Lactic Acid, Plasma [[id number]]  (Abnormal) Collected:  02/22/19 1203    Specimen:  Blood Updated:  02/22/19 1240     Lactate 4.3 mmol/L     Blood Culture - Blood, Hand, " Right [[id number]] Collected:  02/22/19 1203    Specimen:  Blood from Hand, Right Updated:  02/22/19 1211    Blood Culture - Blood, Hand, Right [[id number]] Collected:  02/22/19 1203    Specimen:  Blood from Hand, Right Updated:  02/22/19 1211    Lactic Acid, Reflex Timer (This will reflex a repeat order 3-3:15 hours after ordered.) [[id number]] Collected:  02/22/19 1203    Specimen:  Blood Updated:  02/22/19 1545     Extra Tube Hold for add-ons.     Comment: Auto resulted.       Urinalysis With Microscopic If Indicated (No Culture) - Urine, Clean Catch [[id number]]  (Abnormal) Collected:  02/22/19 1320    Specimen:  Urine, Clean Catch Updated:  02/22/19 1333     Color, UA Yellow     Appearance, UA Clear     pH, UA <=5.0     Specific Gravity, UA >=1.030     Glucose,  mg/dL (1+)     Ketones, UA 40 mg/dL (2+)     Bilirubin, UA Negative     Blood, UA Small (1+)     Protein, UA Negative     Leuk Esterase, UA Negative     Nitrite, UA Negative     Urobilinogen, UA 0.2 E.U./dL    Urinalysis, Microscopic Only - Urine, Clean Catch [[id number]] Collected:  02/22/19 1320    Specimen:  Urine, Clean Catch Updated:  02/22/19 1333     RBC, UA 0-2 /HPF      WBC, UA 0-2 /HPF      Bacteria, UA None Seen /HPF      Squamous Epithelial Cells, UA 0-2 /HPF      Hyaline Casts, UA 3-6 /LPF      Methodology Automated Microscopy          Imaging Results (last 24 hours)     Procedure Component Value Units Date/Time    CT Abdomen Pelvis With Contrast [[id number]] Collected:  02/22/19 1357     Updated:  02/22/19 1457    Narrative:       CT ABDOMEN AND PELVIS WITH CONTRAST-     CLINICAL: Sepsis, diffuse abdominal pain, 68-year-old male.     TECHNIQUE: Radiation dose reduction techniques were utilized, including  automated exposure control and exposure modulation based on body size.     COMPARISON: None.     FINDINGS: Diverticulosis of colon however no overt diverticulitis. The  appendix and small bowel are normal. Stomach is collapsed and  filled  with fluid, overall normal in appearance. There is a small sliding-type  hiatal hernia.     The liver, gallbladder, pancreas and spleen are satisfactory in  appearance. No biliary duct dilatation. Both adrenal glands are normal.  The left kidney is normal. There are 2 right renal cysts the largest 2.3  cm. No obstructive uropathy on the right or left. The ureters are  satisfactory in course and caliber to the bladder which is normal.  Prostate and seminal vesicles are typical in appearance. There are small  lymph nodes within the charli hepatis, precaval space and upper  retroperitoneum. No gross adenopathy.     CONCLUSION: Diverticulosis of the colon however no active  diverticulitis. 2 right renal cysts as described above. Small  sliding-type hiatal hernia. No acute intra-abdominal abnormality.     Findings called to Dr. Zepeda in the emergency room at the time of  completion 2:00 PM.              This report was finalized on 2/22/2019 2:54 PM by Dr. Tae Barajas M.D.       CT Head Without Contrast [[id number]] Collected:  02/22/19 1333     Updated:  02/22/19 1333    Narrative:       EMERGENCY NONCONTRAST HEAD CT 02/22/2019     CLINICAL HISTORY: Confusion and generalized weakness.     TECHNIQUE: Spiral CT images were obtained from the base of the skull to  the vertex without intravenous contrast. Images were reformatted and are  submitted in 3 mm thick axial CT sections with brain algorithm and 2 mm  thick sagittal and coronal reconstructions were performed.     This is correlated to a prior noncontrast head CT 2 days ago on  02/19/2019.     FINDINGS: The brain parenchyma is normal in attenuation. The ventricles  are normal in size. I see no focal mass effect and no midline shift and  no extra-axial fluid collections are identified. There is no evidence of  acute intracranial hemorrhage. Paranasal sinuses and the mastoid air  cells and middle ear cavities are clear. Calvarium and skull base are  normal in  appearance. The orbits are unremarkable.       Impression:       Normal head CT with no change when compared to prior head CT 3 days ago  on 02/19/2019. The etiology of the confusion and generalized weakness is  not established on this exam. Correlate clinically.     Radiation dose reduction techniques were utilized, including automated  exposure control and exposure modulation based on body size.          XR Chest 1 View [[id number]] Collected:  02/22/19 1238     Updated:  02/22/19 1331    Narrative:       1-VIEW PORTABLE CHEST     HISTORY: Weakness. Shortness of breath.     FINDINGS: The lungs are well-expanded and clear. The heart is borderline  enlarged and there is no acute disease or change from 3 days ago.     This report was finalized on 2/22/2019 1:28 PM by Dr. Rosalino Cruz M.D.             ECG 12 Lead   Preliminary Result   HEART RATE= 53  bpm   RR Interval= 1144  ms   OK Interval= 202  ms   P Horizontal Axis= 13  deg   P Front Axis= 73  deg   QRSD Interval= 114  ms   QT Interval= 525  ms   QRS Axis= 70  deg   T Wave Axis= -27  deg   - ABNORMAL ECG -   Sinus rhythm   Inferior infarct, age indeterminate   Electronically Signed By:    Date and Time of Study: 2019-02-22 11:55:46        Assessment/Plan   Active Hospital Problems    Diagnosis Date Noted   • Lactic acidosis [E87.2] 02/22/2019   • Generalized weakness [R53.1] 02/22/2019   • Alcohol use [Z78.9] 02/22/2019   • Atrial fibrillation (CMS/HCC) [I48.91] 06/15/2018   • History of cardiac radiofrequency ablation [Z98.890] 05/07/2018   • Status post insertion of drug-eluting stent into left anterior descending artery [Z95.5] 03/07/2018   • CAD in native artery [I25.10] 03/06/2017      Resolved Hospital Problems   No resolved problems to display.     Mr. Baker is a 68 y.o. with a history of cardiac disease that recently underwent cardioversion who presents with generalized weakness and mild confusion which has resolved. Workup in the ER demonstrates  possible sepsis with unknown source of infection. His ETOH is elevated with lactic acidosis, ketosis, and mild leukocytosis. Pt is afebrile. It is unclear whether this is sepsis versus suspect more likely a dehydration and alcoholic ketosis. Lactic acidosis probably elevated from hypoperfusion, dehydration, hypotension and he is on flomax recently started.    · Admit to telemetry bed  · Continue IVF with normal saline at 125 cc/hr and monitor lactate  · Repeat am labs    · check orthostatics  · Await blood cultures   · Cardiac disease stable at this time without SOA or chest pain. will inform his cardiologist  · Continue Xarelto- INR elevated due to xarelto  · pepcid for indigestion   · monitor for withdrawals    Full code    I discussed the patients findings and my recommendations with patient and family.    DERIK Montoya  [facility]  02/22/19  4:33 PM    Addendum: I have reviewed the history and plan as obtained by DERIK Lujan. I have personally examined the patient. My exam confirms her physical findings and I agree with the plan as listed above.    Giuseppe Desouza MD  02/22/19  5:08 PM

## 2019-02-22 NOTE — PROGRESS NOTES
Clinical Pharmacy Services: Medication History    Vicente Baker is a 68 y.o. male presenting to [facility] for   Chief Complaint   Patient presents with   • Weakness - Generalized   • Fatigue       He  has a past medical history of Atrial fibrillation (CMS/HCC), Atrial fibrillation (CMS/HCC), Bradycardia, Coronary artery disease, Hyperlipidemia, Myocardial infarction (CMS/HCC), Psoriasis, and Thyroid cyst.    Allergies as of 02/22/2019 - Reviewed 02/22/2019   Allergen Reaction Noted   • No known drug allergy  08/20/2012       Medication information was obtained from: Patient, medication list  Pharmacy and Phone Number: [facility] [phone]    Prior to Admission Medications     Prescriptions Last Dose Informant Patient Reported? Taking?    ascorbic acid (VITAMIN C) 1000 MG tablet  Self Yes Yes    Take 1,000 mg by mouth Daily.    aspirin 81 MG tablet  Self Yes Yes    Take 1 tablet by mouth Daily.    carvedilol (COREG) 6.25 MG tablet  Self No Yes    Take 1 tablet by mouth 2 (Two) Times a Day With Meals.    cholecalciferol (VITAMIN D3) 1000 units tablet  Self Yes Yes    Take 1,000 Units by mouth Daily.    Cinnamon 500 MG capsule  Self Yes Yes    Take 1,000 mg by mouth Daily.    Multiple Vitamins-Minerals (CENTRUM SILVER) tablet  Self Yes Yes    Take 1 tablet by mouth Daily.    rivaroxaban (XARELTO) 20 MG tablet  Self No Yes    Take 1 tablet by mouth Daily With Dinner.    rosuvastatin (CRESTOR) 40 MG tablet  Self Yes Yes    Take 40 mg by mouth Daily.    tamsulosin (FLOMAX) 0.4 MG capsule 24 hr capsule  Self Yes Yes    Take 1 capsule by mouth Daily.            Medication notes: Co-Q 10 removed per patient list provided    This medication list is complete to the best of my knowledge as of 2/22/2019    Please call if questions.    Nata Taylor, Medication History Technician  2/22/2019 3:16 PM

## 2019-02-23 VITALS
OXYGEN SATURATION: 99 % | SYSTOLIC BLOOD PRESSURE: 161 MMHG | TEMPERATURE: 98.2 F | WEIGHT: 210.32 LBS | BODY MASS INDEX: 33.8 KG/M2 | DIASTOLIC BLOOD PRESSURE: 90 MMHG | HEIGHT: 66 IN | RESPIRATION RATE: 16 BRPM | HEART RATE: 97 BPM

## 2019-02-23 LAB
ANION GAP SERPL CALCULATED.3IONS-SCNC: 14.3 MMOL/L
BACTERIA BLD CULT: ABNORMAL
BUN BLD-MCNC: 20 MG/DL (ref 8–23)
BUN/CREAT SERPL: 19.8 (ref 7–25)
CALCIUM SPEC-SCNC: 8.1 MG/DL (ref 8.6–10.5)
CHLORIDE SERPL-SCNC: 107 MMOL/L (ref 98–107)
CO2 SERPL-SCNC: 18.7 MMOL/L (ref 22–29)
CREAT BLD-MCNC: 1.01 MG/DL (ref 0.76–1.27)
D-LACTATE SERPL-SCNC: 0.8 MMOL/L (ref 0.5–2)
D-LACTATE SERPL-SCNC: 1.1 MMOL/L (ref 0.5–2)
D-LACTATE SERPL-SCNC: 1.2 MMOL/L (ref 0.5–2)
DEPRECATED RDW RBC AUTO: 49.6 FL (ref 37–54)
ERYTHROCYTE [DISTWIDTH] IN BLOOD BY AUTOMATED COUNT: 13.6 % (ref 12.3–15.4)
GFR SERPL CREATININE-BSD FRML MDRD: 74 ML/MIN/1.73
GLUCOSE BLD-MCNC: 79 MG/DL (ref 65–99)
GLUCOSE BLDC GLUCOMTR-MCNC: 107 MG/DL (ref 70–130)
GLUCOSE BLDC GLUCOMTR-MCNC: 74 MG/DL (ref 70–130)
HCT VFR BLD AUTO: 35.8 % (ref 37.5–51)
HGB BLD-MCNC: 11.9 G/DL (ref 13–17.7)
MCH RBC QN AUTO: 33 PG (ref 26.6–33)
MCHC RBC AUTO-ENTMCNC: 33.2 G/DL (ref 31.5–35.7)
MCV RBC AUTO: 99.2 FL (ref 79–97)
PLATELET # BLD AUTO: 161 10*3/MM3 (ref 140–450)
PMV BLD AUTO: 11.2 FL (ref 6–12)
POTASSIUM BLD-SCNC: 4.1 MMOL/L (ref 3.5–5.2)
RBC # BLD AUTO: 3.61 10*6/MM3 (ref 4.14–5.8)
SODIUM BLD-SCNC: 140 MMOL/L (ref 136–145)
WBC NRBC COR # BLD: 5.62 10*3/MM3 (ref 3.4–10.8)

## 2019-02-23 PROCEDURE — G0378 HOSPITAL OBSERVATION PER HR: HCPCS

## 2019-02-23 PROCEDURE — 99214 OFFICE O/P EST MOD 30 MIN: CPT | Performed by: INTERNAL MEDICINE

## 2019-02-23 PROCEDURE — 85027 COMPLETE CBC AUTOMATED: CPT | Performed by: NURSE PRACTITIONER

## 2019-02-23 PROCEDURE — 80048 BASIC METABOLIC PNL TOTAL CA: CPT | Performed by: NURSE PRACTITIONER

## 2019-02-23 PROCEDURE — 83605 ASSAY OF LACTIC ACID: CPT | Performed by: HOSPITALIST

## 2019-02-23 PROCEDURE — 82962 GLUCOSE BLOOD TEST: CPT

## 2019-02-23 RX ORDER — ECHINACEA PURPUREA EXTRACT 125 MG
1 TABLET ORAL AS NEEDED
Status: DISCONTINUED | OUTPATIENT
Start: 2019-02-23 | End: 2019-02-23 | Stop reason: HOSPADM

## 2019-02-23 RX ADMIN — FAMOTIDINE 40 MG: 40 TABLET, FILM COATED ORAL at 09:50

## 2019-02-23 RX ADMIN — CARVEDILOL 6.25 MG: 6.25 TABLET, FILM COATED ORAL at 09:49

## 2019-02-23 RX ADMIN — ASPIRIN 81 MG: 81 TABLET, DELAYED RELEASE ORAL at 09:50

## 2019-02-23 RX ADMIN — SODIUM CHLORIDE, PRESERVATIVE FREE 3 ML: 5 INJECTION INTRAVENOUS at 09:49

## 2019-02-23 NOTE — DISCHARGE SUMMARY
[facility]    Date of Discharge:  2/23/2019    PCP: Betty Schultz MD    Discharge Diagnosis:   Active Hospital Problems    Diagnosis Date Noted   • **Lactic acidosis [E87.2] 02/22/2019   • Generalized weakness [R53.1] 02/22/2019   • Alcohol use [Z78.9] 02/22/2019   • Elevated lactic acid level [R79.89] 02/22/2019   • Atrial fibrillation (CMS/HCC) [I48.91] 06/15/2018   • History of cardiac radiofrequency ablation [Z98.890] 05/07/2018   • Status post insertion of drug-eluting stent into left anterior descending artery [Z95.5] 03/07/2018   • CAD in native artery [I25.10] 03/06/2017      Resolved Hospital Problems   No resolved problems to display.        Consults     Date and Time Order Name Status Description    2/22/2019 1718 Inpatient Cardiology Consult      2/22/2019 1355 [facility] (on-call MD unless specified) Completed     2/19/2019 1055 [facility] (on-call MD unless specified) Completed         Hospital Course  Please see history and physical for details. Patient is a 68 y.o. male admitted with generalized weakness and confusion.  In the emergency department he had elevated lactate and alcohol level was elevated also with ketonuria.  It was felt that he probably was dehydrated and had some mild alcoholic ketosis.  Lactate elevation probably from hypoperfusion/dehydration/hypotension.  He was given fluids overnight and today he is feeling back to normal.  He has had no fever no hypotension.  Blood cultures no growth to date.  Pro-calcitonin level not elevated.  Chest x-ray showed no acute disease.  CT head was unremarkable.  CT of the abdomen pelvis showed diverticulosis but no active diverticulitis.  He had 2 renal cysts and a small sliding-type hiatal hernia.  Lactate today is normal.    I discussed the patient's findings and my recommendations with patient and nursing staff and Dr. Romero.    Condition on Discharge: Improved.  Patient would like to go home  today.  Recheck BMP next week.    Temp:  [97 °F (36.1 °C)-98.1 °F (36.7 °C)] 97.5 °F (36.4 °C)  Heart Rate:  [45-97] 97  Resp:  [16-18] 16  BP: (119-148)/(72-88) 148/82  Body mass index is 33.95 kg/m².    Physical Exam   Constitutional: He is oriented to person, place, and time. No distress.   Cardiovascular: Normal rate and regular rhythm.   Pulmonary/Chest: Effort normal and breath sounds normal. No respiratory distress.   Abdominal: Soft. Bowel sounds are normal. There is no tenderness. There is no rebound and no guarding.   Musculoskeletal: He exhibits no edema.   Neurological: He is alert and oriented to person, place, and time.   Skin: Skin is warm and dry.   Psychiatric: He has a normal mood and affect. His behavior is normal.        Discharge Medications      Continue These Medications      Instructions Start Date   ascorbic acid 1000 MG tablet  Commonly known as:  VITAMIN C   1,000 mg, Oral, Daily      aspirin 81 MG tablet   81 mg, Oral, Daily      carvedilol 6.25 MG tablet  Commonly known as:  COREG   6.25 mg, Oral, 2 Times Daily With Meals      CENTRUM SILVER tablet   1 tablet, Oral, Daily      cholecalciferol 1000 units tablet  Commonly known as:  VITAMIN D3   1,000 Units, Oral, Daily      rivaroxaban 20 MG tablet  Commonly known as:  XARELTO   20 mg, Oral, Daily With Dinner      rosuvastatin 40 MG tablet  Commonly known as:  CRESTOR   40 mg, Oral, Daily      tamsulosin 0.4 MG capsule 24 hr capsule  Commonly known as:  FLOMAX   1 capsule, Oral, Daily         Stop These Medications    Cinnamon 500 MG capsule           Diet Instructions     Diet: Regular, Cardiac      Discharge Diet:   Regular  Cardiac            Activity Instructions     Activity as Tolerated           Additional Instructions for the Follow-ups that You Need to Schedule     Call MD for problems / concerns.   As directed      Basic Metabolic Panel    Feb 26, 2019 (Approximate)        Follow-up Information     Betty Schultz MD Follow  up in 1 week(s).    Specialty:  Internal Medicine  Contact information:  [address] [place], [place] [id number] [phone]                 Test Results Pending at Discharge   Order Current Status    Blood Culture - Blood, Hand, Right Preliminary result    Blood Culture - Blood, Hand, Right Preliminary result         Giuseppe Desouza MD  02/23/19  1:25 PM    Discharge time spent greater than 30 minutes.

## 2019-02-23 NOTE — CONSULTS
Date of Consultation: 19    Referral Provider: Giuseppe Desouza MD     Reason for Consultation: Paroxysmal atrial fibrillation, coronary artery disease    Encounter Provider: Boo Romero MD    Group of Service: [facility]     Patient Name: Vicente Baker    :1950    Chief complaint:  Generalized weakness, diaphoresis    History of Present Illness:      This is a very pleasant 68 year-old white male who is normally followed by Dr. Ashby and Dr. Barrios.  He has a history of one of many paroxysmal atrial fibrillation, sinus bradycardia, and coronary artery disease.  He just recently underwent a cardioversion for atrial fibrillation on 2019.  He also recently had an upper respiratory tract infection, and completed a course of Zithromax.  His most recent ejection fraction is 47%.    The patient woke up on the day of presentation with generalized weakness and diaphoresis.  He stood up, felt lightheaded, and was very weak.  He was also complaining of a dry mouth.  His blood alcohol level was 47, although he states he only had 3 glasses of wine over several hours the night prior.  His lactic acid was significantly elevated at 4.3.  He has received IV antibiotics empirically, and blood cultures have been drawn.  He has been hydrated with IV fluids, and feels much better.  His lactic acid is better.  He has not had any issues with his atrial fibrillation.  He has not had any bleeding with the Xarelto.    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         Past Surgical History:   Procedure Laterality Date   • CARDIAC CATHETERIZATION     • CARDIAC ELECTROPHYSIOLOGY PROCEDURE N/A 2018    Procedure: Ablation atrial flutter;  Surgeon: Aaron  MD Sophie;  Location: [facility] INVASIVE LOCATION;  Service: Cardiovascular   • CORONARY ANGIOPLASTY WITH STENT PLACEMENT     • KNEE ARTHROSCOPY W/ MENISCAL REPAIR Left    • THYROID SURGERY      cyst removal   • TONSILLECTOMY           Allergies   Allergen Reactions   • No Known Drug Allergy          No current facility-administered medications on file prior to encounter.      Current Outpatient Medications on File Prior to Encounter   Medication Sig Dispense Refill   • ascorbic acid (VITAMIN C) 1000 MG tablet Take 1,000 mg by mouth Daily.     • aspirin 81 MG tablet Take 1 tablet by mouth Daily. 30 tablet 11   • carvedilol (COREG) 6.25 MG tablet Take 1 tablet by mouth 2 (Two) Times a Day With Meals.     • cholecalciferol (VITAMIN D3) 1000 units tablet Take 1,000 Units by mouth Daily.     • Cinnamon 500 MG capsule Take 1,000 mg by mouth Daily.     • Multiple Vitamins-Minerals (CENTRUM SILVER) tablet Take 1 tablet by mouth Daily.     • rivaroxaban (XARELTO) 20 MG tablet Take 1 tablet by mouth Daily With Dinner. 30 tablet 11   • rosuvastatin (CRESTOR) 40 MG tablet Take 40 mg by mouth Daily.     • tamsulosin (FLOMAX) 0.4 MG capsule 24 hr capsule Take 1 capsule by mouth Daily.           Social History     Socioeconomic History   • Marital status:      Spouse name: Not on file   • Number of children: Not on file   • Years of education: Not on file   • Highest education level: Not on file   Social Needs   • Financial resource strain: Not on file   • Food insecurity - worry: Not on file   • Food insecurity - inability: Not on file   • Transportation needs - medical: Not on file   • Transportation needs - non-medical: Not on file   Occupational History   • Not on file   Tobacco Use   • Smoking status: Former Smoker     Packs/day: 0.50     Years: 37.00     Pack years: 18.50     Types: Cigarettes   • Smokeless tobacco: Never Used   • Tobacco comment: caffeine use   Substance and Sexual Activity   • Alcohol use: No  "    Alcohol/week: 1.2 oz     Types: 2 Glasses of wine per week     Frequency: Never     Comment: social   • Drug use: Defer   • Sexual activity: Defer   Other Topics Concern   • Not on file   Social History Narrative   • Not on file         Family History   Problem Relation Age of Onset   • No Known Problems Mother    • Esophageal cancer Father    • No Known Problems Sister    • No Known Problems Brother        REVIEW OF SYSTEMS:   Pertinent positives were noted in the history of present illness above.  Otherwise, all other systems were reviewed, and are negative.     Objective:     Vitals:    02/23/19 0552 02/23/19 0707 02/23/19 0949 02/23/19 1333   BP:  119/78 148/82 161/90   BP Location:  Right arm  Right arm   Patient Position:  Lying  Lying   Pulse:  (!) 45 97    Resp:  16  16   Temp:  97.5 °F (36.4 °C)  98.2 °F (36.8 °C)   TempSrc:  Oral  Oral   SpO2:       Weight: 95.4 kg (210 lb 5.1 oz)      Height:         Body mass index is 33.95 kg/m².  Flowsheet Rows      First Filed Value   Admission Height  167.6 cm (66\") Documented at 02/22/2019 1130   Admission Weight  89.9 kg (198 lb 4.8 oz) Documented at 02/22/2019 1130           General:    No acute distress, alert and oriented x4, pleasant                   Head:    Normocephalic, atraumatic.   Eyes:          Conjunctivae and sclerae normal, no icterus, PERRLA   Throat:   No oral lesions, no thrush, oral mucosa moist.    Neck:   Supple, trachea midline.   Lungs:     Occasional rhonchi bilaterally     Heart:    Regular rhythm and evelin rate.  No murmurs, gallops, or rubs noted.   Abdomen:     Soft, non-tender, non-distended, positive bowel sounds.    Extremities:   No clubbing, cyanosis, or edema.     Pulses:   Pulses palpable and equal bilaterally.    Skin:   No bleeding or rash.   Neuro:   Non-focal.  Moves all extremities well.    Psychiatric:   Normal mood and affect.         Lab Review:                Results from last 7 days   Lab Units 02/23/19  0552 "   SODIUM mmol/L 140   POTASSIUM mmol/L 4.1   CHLORIDE mmol/L 107   CO2 mmol/L 18.7*   BUN mg/dL 20   CREATININE mg/dL 1.01   GLUCOSE mg/dL 79   CALCIUM mg/dL 8.1*     Results from last 7 days   Lab Units 02/22/19  1144 02/19/19  0950   TROPONIN T ng/mL <0.010 <0.010     Results from last 7 days   Lab Units 02/23/19  0552   WBC 10*3/mm3 5.62   HEMOGLOBIN g/dL 11.9*   HEMATOCRIT % 35.8*   PLATELETS 10*3/mm3 161     Results from last 7 days   Lab Units 02/22/19  1144 02/19/19  0950   INR  2.39* 1.49*                   EKG (reviewed by me personally): Sinus bradycardia, prior inferior infarct      Assessment:     • Lactic acidosis [E87.2] 02/22/2019   • Generalized weakness [R53.1] 02/22/2019   • Alcohol use [Z78.9] 02/22/2019   • Atrial fibrillation (CMS/HCC) [I48.91] 06/15/2018   • History of cardiac radiofrequency ablation [Z98.890] 05/07/2018   • Status post insertion of drug-eluting stent into left anterior descending artery [Z95.5] 03/07/2018   • CAD in native artery [I25.10]           Plan:        Again, the patient is stable from a cardiac standpoint at this time.  I did review his telemetry, and he has not had any significant atrial fibrillation.  He recently had a direct-current cardioversion.  He has responded very well to saline.  I suspect that alcohol and volume depletion played a role in his lactic acidosis and hypotension.  He is now better.  I would not change any of his medications at this time.  He is taking Xarelto without any difficulty.  He will continue on the aspirin and Crestor for his history of coronary artery disease.  He is stable, and has had no angina.  I will have him follow-up with Dr. Ashby as scheduled.  He is stable for discharge from a cardiac standpoint.    Thank you very much this consult.    Kana Romero M.D.

## 2019-02-23 NOTE — PLAN OF CARE
Problem: Patient Care Overview  Goal: Plan of Care Review  Outcome: Ongoing (interventions implemented as appropriate)   02/22/19 5617   Coping/Psychosocial   Plan of Care Reviewed With patient   Plan of Care Review   Progress no change   OTHER   Outcome Summary Pt admit with RUQ pain and imaging showing Gallbladder obstruction,h/o of MI this week, HR elevated, IV fluids continuous, on RA

## 2019-02-23 NOTE — PLAN OF CARE
Problem: Patient Care Overview  Goal: Plan of Care Review  Outcome: Outcome(s) achieved Date Met: 02/23/19  Pt much better, asymptomatic. Blood glucose resolved. No CIWA symptoms. Home w/PCP f/u next week.  Goal: Individualization and Mutuality  Outcome: Ongoing (interventions implemented as appropriate)

## 2019-02-23 NOTE — SIGNIFICANT NOTE
02/23/19 1230   Rehab Time/Intention   Evaluation Not Performed other (see comments)  (Patient independent with mobility without any balance deficits. No need for skilled PT at this time. D/C PT )   Rehab Treatment   Discipline physical therapist

## 2019-02-24 ENCOUNTER — APPOINTMENT (OUTPATIENT)
Dept: GENERAL RADIOLOGY | Facility: HOSPITAL | Age: 69
End: 2019-02-24

## 2019-02-24 ENCOUNTER — HOSPITAL ENCOUNTER (EMERGENCY)
Facility: HOSPITAL | Age: 69
Discharge: HOME | End: 2019-02-24
Attending: EMERGENCY MEDICINE | Admitting: EMERGENCY MEDICINE

## 2019-02-24 VITALS
DIASTOLIC BLOOD PRESSURE: 80 MMHG | WEIGHT: 206.7 LBS | OXYGEN SATURATION: 97 % | SYSTOLIC BLOOD PRESSURE: 149 MMHG | HEART RATE: 49 BPM | BODY MASS INDEX: 33.22 KG/M2 | HEIGHT: 66 IN | RESPIRATION RATE: 18 BRPM | TEMPERATURE: 98.3 F

## 2019-02-24 DIAGNOSIS — R00.1 SINUS BRADYCARDIA: ICD-10-CM

## 2019-02-24 DIAGNOSIS — R06.02 SHORTNESS OF BREATH: Primary | ICD-10-CM

## 2019-02-24 LAB
ALBUMIN SERPL-MCNC: 4.1 G/DL (ref 3.5–5.2)
ALBUMIN/GLOB SERPL: 1.8 G/DL
ALP SERPL-CCNC: 66 U/L (ref 39–117)
ALT SERPL W P-5'-P-CCNC: 31 U/L (ref 1–41)
ANION GAP SERPL CALCULATED.3IONS-SCNC: 14.8 MMOL/L
AST SERPL-CCNC: 32 U/L (ref 1–40)
BACTERIA SPEC AEROBE CULT: ABNORMAL
BASOPHILS # BLD AUTO: 0.04 10*3/MM3 (ref 0–0.2)
BASOPHILS NFR BLD AUTO: 0.5 % (ref 0–1.5)
BILIRUB SERPL-MCNC: 0.6 MG/DL (ref 0.1–1.2)
BUN BLD-MCNC: 15 MG/DL (ref 8–23)
BUN/CREAT SERPL: 13.8 (ref 7–25)
CALCIUM SPEC-SCNC: 8.8 MG/DL (ref 8.6–10.5)
CHLORIDE SERPL-SCNC: 106 MMOL/L (ref 98–107)
CO2 SERPL-SCNC: 22.2 MMOL/L (ref 22–29)
CREAT BLD-MCNC: 1.09 MG/DL (ref 0.76–1.27)
DEPRECATED RDW RBC AUTO: 50 FL (ref 37–54)
EOSINOPHIL # BLD AUTO: 0.1 10*3/MM3 (ref 0–0.4)
EOSINOPHIL NFR BLD AUTO: 1.2 % (ref 0.3–6.2)
ERYTHROCYTE [DISTWIDTH] IN BLOOD BY AUTOMATED COUNT: 13.6 % (ref 12.3–15.4)
GFR SERPL CREATININE-BSD FRML MDRD: 68 ML/MIN/1.73
GLOBULIN UR ELPH-MCNC: 2.3 GM/DL
GLUCOSE BLD-MCNC: 120 MG/DL (ref 65–99)
GRAM STN SPEC: ABNORMAL
HCT VFR BLD AUTO: 38 % (ref 37.5–51)
HGB BLD-MCNC: 12.9 G/DL (ref 13–17.7)
IMM GRANULOCYTES # BLD AUTO: 0.04 10*3/MM3 (ref 0–0.05)
IMM GRANULOCYTES NFR BLD AUTO: 0.5 % (ref 0–0.5)
LYMPHOCYTES # BLD AUTO: 1 10*3/MM3 (ref 0.7–3.1)
LYMPHOCYTES NFR BLD AUTO: 12 % (ref 19.6–45.3)
MCH RBC QN AUTO: 33.9 PG (ref 26.6–33)
MCHC RBC AUTO-ENTMCNC: 33.9 G/DL (ref 31.5–35.7)
MCV RBC AUTO: 100 FL (ref 79–97)
MONOCYTES # BLD AUTO: 0.67 10*3/MM3 (ref 0.1–0.9)
MONOCYTES NFR BLD AUTO: 8.1 % (ref 5–12)
NEUTROPHILS # BLD AUTO: 6.45 10*3/MM3 (ref 1.4–7)
NEUTROPHILS NFR BLD AUTO: 77.7 % (ref 42.7–76)
NRBC BLD AUTO-RTO: 0 /100 WBC (ref 0–0)
NT-PROBNP SERPL-MCNC: 618.2 PG/ML (ref 5–900)
PLATELET # BLD AUTO: 174 10*3/MM3 (ref 140–450)
PMV BLD AUTO: 11.5 FL (ref 6–12)
POTASSIUM BLD-SCNC: 3.8 MMOL/L (ref 3.5–5.2)
PROT SERPL-MCNC: 6.4 G/DL (ref 6–8.5)
RBC # BLD AUTO: 3.8 10*6/MM3 (ref 4.14–5.8)
SODIUM BLD-SCNC: 143 MMOL/L (ref 136–145)
TROPONIN T SERPL-MCNC: <0.01 NG/ML (ref 0–0.03)
WBC NRBC COR # BLD: 8.3 10*3/MM3 (ref 3.4–10.8)

## 2019-02-24 PROCEDURE — 93010 ELECTROCARDIOGRAM REPORT: CPT | Performed by: INTERNAL MEDICINE

## 2019-02-24 PROCEDURE — 93005 ELECTROCARDIOGRAM TRACING: CPT

## 2019-02-24 PROCEDURE — 99283 EMERGENCY DEPT VISIT LOW MDM: CPT

## 2019-02-24 PROCEDURE — 93005 ELECTROCARDIOGRAM TRACING: CPT | Performed by: EMERGENCY MEDICINE

## 2019-02-24 PROCEDURE — 96374 THER/PROPH/DIAG INJ IV PUSH: CPT

## 2019-02-24 PROCEDURE — 80053 COMPREHEN METABOLIC PANEL: CPT | Performed by: EMERGENCY MEDICINE

## 2019-02-24 PROCEDURE — 84484 ASSAY OF TROPONIN QUANT: CPT | Performed by: EMERGENCY MEDICINE

## 2019-02-24 PROCEDURE — 85025 COMPLETE CBC W/AUTO DIFF WBC: CPT | Performed by: EMERGENCY MEDICINE

## 2019-02-24 PROCEDURE — 25010000002 FUROSEMIDE PER 20 MG: Performed by: EMERGENCY MEDICINE

## 2019-02-24 PROCEDURE — 71046 X-RAY EXAM CHEST 2 VIEWS: CPT

## 2019-02-24 PROCEDURE — 83880 ASSAY OF NATRIURETIC PEPTIDE: CPT | Performed by: EMERGENCY MEDICINE

## 2019-02-24 RX ORDER — FUROSEMIDE 10 MG/ML
40 INJECTION INTRAMUSCULAR; INTRAVENOUS ONCE
Status: COMPLETED | OUTPATIENT
Start: 2019-02-24 | End: 2019-02-24

## 2019-02-24 RX ORDER — FUROSEMIDE 20 MG/1
20 TABLET ORAL DAILY
Qty: 3 TABLET | Refills: 0 | Status: SHIPPED | OUTPATIENT
Start: 2019-02-24 | End: 2019-02-28 | Stop reason: HOSPADM

## 2019-02-24 RX ORDER — SODIUM CHLORIDE 0.9 % (FLUSH) 0.9 %
10 SYRINGE (ML) INJECTION AS NEEDED
Status: DISCONTINUED | OUTPATIENT
Start: 2019-02-24 | End: 2019-02-24 | Stop reason: HOSPADM

## 2019-02-24 RX ADMIN — FUROSEMIDE 40 MG: 10 INJECTION, SOLUTION INTRAMUSCULAR; INTRAVENOUS at 07:27

## 2019-02-26 ENCOUNTER — TELEPHONE (OUTPATIENT)
Dept: CARDIOLOGY | Facility: CLINIC | Age: 69
End: 2019-02-26

## 2019-02-26 ENCOUNTER — APPOINTMENT (OUTPATIENT)
Dept: GENERAL RADIOLOGY | Facility: HOSPITAL | Age: 69
End: 2019-02-26

## 2019-02-26 ENCOUNTER — HOSPITAL ENCOUNTER (OUTPATIENT)
Facility: HOSPITAL | Age: 69
Setting detail: OBSERVATION
Discharge: HOME | End: 2019-02-28
Attending: EMERGENCY MEDICINE | Admitting: INTERNAL MEDICINE

## 2019-02-26 DIAGNOSIS — I48.91 ATRIAL FIBRILLATION WITH RVR (HCC): Primary | ICD-10-CM

## 2019-02-26 PROBLEM — N17.9 AKI (ACUTE KIDNEY INJURY) (HCC): Status: ACTIVE | Noted: 2019-02-26

## 2019-02-26 PROBLEM — R09.81 SINUS CONGESTION: Status: ACTIVE | Noted: 2019-02-26

## 2019-02-26 LAB
ALBUMIN SERPL-MCNC: 4.2 G/DL (ref 3.5–5.2)
ALBUMIN/GLOB SERPL: 1.5 G/DL
ALP SERPL-CCNC: 66 U/L (ref 39–117)
ALT SERPL W P-5'-P-CCNC: 30 U/L (ref 1–41)
ANION GAP SERPL CALCULATED.3IONS-SCNC: 12.3 MMOL/L
AST SERPL-CCNC: 27 U/L (ref 1–40)
BASOPHILS # BLD AUTO: 0.05 10*3/MM3 (ref 0–0.2)
BASOPHILS NFR BLD AUTO: 0.8 % (ref 0–1.5)
BILIRUB SERPL-MCNC: 0.9 MG/DL (ref 0.1–1.2)
BUN BLD-MCNC: 18 MG/DL (ref 8–23)
BUN/CREAT SERPL: 13.1 (ref 7–25)
CALCIUM SPEC-SCNC: 9.7 MG/DL (ref 8.6–10.5)
CHLORIDE SERPL-SCNC: 101 MMOL/L (ref 98–107)
CO2 SERPL-SCNC: 25.7 MMOL/L (ref 22–29)
CREAT BLD-MCNC: 1.37 MG/DL (ref 0.76–1.27)
DEPRECATED RDW RBC AUTO: 48 FL (ref 37–54)
EOSINOPHIL # BLD AUTO: 0.16 10*3/MM3 (ref 0–0.4)
EOSINOPHIL NFR BLD AUTO: 2.5 % (ref 0.3–6.2)
ERYTHROCYTE [DISTWIDTH] IN BLOOD BY AUTOMATED COUNT: 13.4 % (ref 12.3–15.4)
ETHANOL BLD-MCNC: <10 MG/DL (ref 0–10)
ETHANOL UR QL: <0.01 %
GFR SERPL CREATININE-BSD FRML MDRD: 52 ML/MIN/1.73
GLOBULIN UR ELPH-MCNC: 2.8 GM/DL
GLUCOSE BLD-MCNC: 127 MG/DL (ref 65–99)
HBA1C MFR BLD: 5.6 % (ref 4.8–5.6)
HCT VFR BLD AUTO: 44.1 % (ref 37.5–51)
HGB BLD-MCNC: 14.8 G/DL (ref 13–17.7)
IMM GRANULOCYTES # BLD AUTO: 0.02 10*3/MM3 (ref 0–0.05)
IMM GRANULOCYTES NFR BLD AUTO: 0.3 % (ref 0–0.5)
INR PPP: 1.48 (ref 0.9–1.1)
LYMPHOCYTES # BLD AUTO: 1.37 10*3/MM3 (ref 0.7–3.1)
LYMPHOCYTES NFR BLD AUTO: 21.1 % (ref 19.6–45.3)
MAGNESIUM SERPL-MCNC: 2 MG/DL (ref 1.6–2.4)
MCH RBC QN AUTO: 33 PG (ref 26.6–33)
MCHC RBC AUTO-ENTMCNC: 33.6 G/DL (ref 31.5–35.7)
MCV RBC AUTO: 98.2 FL (ref 79–97)
MONOCYTES # BLD AUTO: 0.64 10*3/MM3 (ref 0.1–0.9)
MONOCYTES NFR BLD AUTO: 9.8 % (ref 5–12)
NEUTROPHILS # BLD AUTO: 4.26 10*3/MM3 (ref 1.4–7)
NEUTROPHILS NFR BLD AUTO: 65.5 % (ref 42.7–76)
NRBC BLD AUTO-RTO: 0 /100 WBC (ref 0–0)
PLATELET # BLD AUTO: 219 10*3/MM3 (ref 140–450)
PMV BLD AUTO: 11.2 FL (ref 6–12)
POTASSIUM BLD-SCNC: 4.4 MMOL/L (ref 3.5–5.2)
PROT SERPL-MCNC: 7 G/DL (ref 6–8.5)
PROTHROMBIN TIME: 17.7 SECONDS (ref 11.7–14.2)
RBC # BLD AUTO: 4.49 10*6/MM3 (ref 4.14–5.8)
SODIUM BLD-SCNC: 139 MMOL/L (ref 136–145)
TROPONIN T SERPL-MCNC: <0.01 NG/ML (ref 0–0.03)
TROPONIN T SERPL-MCNC: <0.01 NG/ML (ref 0–0.03)
WBC NRBC COR # BLD: 6.5 10*3/MM3 (ref 3.4–10.8)

## 2019-02-26 PROCEDURE — 87040 BLOOD CULTURE FOR BACTERIA: CPT | Performed by: INTERNAL MEDICINE

## 2019-02-26 PROCEDURE — 25010000002 DIGOXIN PER 500 MCG: Performed by: EMERGENCY MEDICINE

## 2019-02-26 PROCEDURE — 80307 DRUG TEST PRSMV CHEM ANLYZR: CPT | Performed by: EMERGENCY MEDICINE

## 2019-02-26 PROCEDURE — G0378 HOSPITAL OBSERVATION PER HR: HCPCS

## 2019-02-26 PROCEDURE — 93010 ELECTROCARDIOGRAM REPORT: CPT | Performed by: INTERNAL MEDICINE

## 2019-02-26 PROCEDURE — 96376 TX/PRO/DX INJ SAME DRUG ADON: CPT

## 2019-02-26 PROCEDURE — 99284 EMERGENCY DEPT VISIT MOD MDM: CPT

## 2019-02-26 PROCEDURE — 96374 THER/PROPH/DIAG INJ IV PUSH: CPT

## 2019-02-26 PROCEDURE — 25010000002 DIGOXIN PER 500 MCG: Performed by: NURSE PRACTITIONER

## 2019-02-26 PROCEDURE — 96375 TX/PRO/DX INJ NEW DRUG ADDON: CPT

## 2019-02-26 PROCEDURE — 93005 ELECTROCARDIOGRAM TRACING: CPT

## 2019-02-26 PROCEDURE — 80053 COMPREHEN METABOLIC PANEL: CPT | Performed by: EMERGENCY MEDICINE

## 2019-02-26 PROCEDURE — 83036 HEMOGLOBIN GLYCOSYLATED A1C: CPT | Performed by: NURSE PRACTITIONER

## 2019-02-26 PROCEDURE — 93005 ELECTROCARDIOGRAM TRACING: CPT | Performed by: EMERGENCY MEDICINE

## 2019-02-26 PROCEDURE — 85610 PROTHROMBIN TIME: CPT | Performed by: EMERGENCY MEDICINE

## 2019-02-26 PROCEDURE — 71046 X-RAY EXAM CHEST 2 VIEWS: CPT

## 2019-02-26 PROCEDURE — 84484 ASSAY OF TROPONIN QUANT: CPT | Performed by: NURSE PRACTITIONER

## 2019-02-26 PROCEDURE — 99213 OFFICE O/P EST LOW 20 MIN: CPT | Performed by: NURSE PRACTITIONER

## 2019-02-26 PROCEDURE — 85025 COMPLETE CBC W/AUTO DIFF WBC: CPT | Performed by: EMERGENCY MEDICINE

## 2019-02-26 PROCEDURE — 84484 ASSAY OF TROPONIN QUANT: CPT | Performed by: EMERGENCY MEDICINE

## 2019-02-26 PROCEDURE — 36415 COLL VENOUS BLD VENIPUNCTURE: CPT | Performed by: NURSE PRACTITIONER

## 2019-02-26 PROCEDURE — 83735 ASSAY OF MAGNESIUM: CPT | Performed by: EMERGENCY MEDICINE

## 2019-02-26 RX ORDER — DIGOXIN 0.25 MG/ML
250 INJECTION INTRAMUSCULAR; INTRAVENOUS ONCE
Status: COMPLETED | OUTPATIENT
Start: 2019-02-26 | End: 2019-02-26

## 2019-02-26 RX ORDER — METOPROLOL TARTRATE 5 MG/5ML
5 INJECTION INTRAVENOUS ONCE
Status: COMPLETED | OUTPATIENT
Start: 2019-02-26 | End: 2019-02-26

## 2019-02-26 RX ORDER — DIGOXIN 0.25 MG/ML
500 INJECTION INTRAMUSCULAR; INTRAVENOUS ONCE
Status: COMPLETED | OUTPATIENT
Start: 2019-02-26 | End: 2019-02-26

## 2019-02-26 RX ORDER — ECHINACEA PURPUREA EXTRACT 125 MG
2 TABLET ORAL AS NEEDED
Status: DISCONTINUED | OUTPATIENT
Start: 2019-02-26 | End: 2019-02-28 | Stop reason: HOSPADM

## 2019-02-26 RX ORDER — CARVEDILOL 6.25 MG/1
6.25 TABLET ORAL 2 TIMES DAILY WITH MEALS
Status: DISCONTINUED | OUTPATIENT
Start: 2019-02-26 | End: 2019-02-27

## 2019-02-26 RX ORDER — NITROGLYCERIN 0.4 MG/1
0.4 TABLET SUBLINGUAL
Status: DISCONTINUED | OUTPATIENT
Start: 2019-02-26 | End: 2019-02-28 | Stop reason: HOSPADM

## 2019-02-26 RX ORDER — ROSUVASTATIN CALCIUM 40 MG/1
40 TABLET, COATED ORAL DAILY
Status: DISCONTINUED | OUTPATIENT
Start: 2019-02-26 | End: 2019-02-28 | Stop reason: HOSPADM

## 2019-02-26 RX ORDER — ONDANSETRON 2 MG/ML
4 INJECTION INTRAMUSCULAR; INTRAVENOUS EVERY 6 HOURS PRN
Status: DISCONTINUED | OUTPATIENT
Start: 2019-02-26 | End: 2019-02-28 | Stop reason: HOSPADM

## 2019-02-26 RX ORDER — SODIUM CHLORIDE 0.9 % (FLUSH) 0.9 %
10 SYRINGE (ML) INJECTION AS NEEDED
Status: DISCONTINUED | OUTPATIENT
Start: 2019-02-26 | End: 2019-02-28 | Stop reason: HOSPADM

## 2019-02-26 RX ORDER — ASPIRIN 81 MG/1
81 TABLET ORAL DAILY
Status: DISCONTINUED | OUTPATIENT
Start: 2019-02-27 | End: 2019-02-28 | Stop reason: HOSPADM

## 2019-02-26 RX ORDER — FLUTICASONE PROPIONATE 50 MCG
2 SPRAY, SUSPENSION (ML) NASAL DAILY
Status: DISCONTINUED | OUTPATIENT
Start: 2019-02-26 | End: 2019-02-28 | Stop reason: HOSPADM

## 2019-02-26 RX ORDER — SODIUM CHLORIDE 9 MG/ML
50 INJECTION, SOLUTION INTRAVENOUS CONTINUOUS
Status: DISCONTINUED | OUTPATIENT
Start: 2019-02-26 | End: 2019-02-26

## 2019-02-26 RX ORDER — ACETAMINOPHEN 325 MG/1
650 TABLET ORAL EVERY 6 HOURS PRN
Status: DISCONTINUED | OUTPATIENT
Start: 2019-02-26 | End: 2019-02-28 | Stop reason: HOSPADM

## 2019-02-26 RX ORDER — ASCORBIC ACID 500 MG
1000 TABLET ORAL DAILY
Status: DISCONTINUED | OUTPATIENT
Start: 2019-02-27 | End: 2019-02-28 | Stop reason: HOSPADM

## 2019-02-26 RX ORDER — DIGOXIN 0.25 MG/ML
250 INJECTION INTRAMUSCULAR; INTRAVENOUS ONCE
Status: DISCONTINUED | OUTPATIENT
Start: 2019-02-26 | End: 2019-02-26

## 2019-02-26 RX ORDER — SODIUM CHLORIDE 0.9 % (FLUSH) 0.9 %
3-10 SYRINGE (ML) INJECTION AS NEEDED
Status: DISCONTINUED | OUTPATIENT
Start: 2019-02-26 | End: 2019-02-28 | Stop reason: HOSPADM

## 2019-02-26 RX ORDER — MELATONIN
1000 DAILY
Status: DISCONTINUED | OUTPATIENT
Start: 2019-02-27 | End: 2019-02-28 | Stop reason: HOSPADM

## 2019-02-26 RX ORDER — SODIUM CHLORIDE 0.9 % (FLUSH) 0.9 %
3 SYRINGE (ML) INJECTION EVERY 12 HOURS SCHEDULED
Status: DISCONTINUED | OUTPATIENT
Start: 2019-02-26 | End: 2019-02-28 | Stop reason: HOSPADM

## 2019-02-26 RX ORDER — SODIUM CHLORIDE 9 MG/ML
100 INJECTION, SOLUTION INTRAVENOUS CONTINUOUS
Status: DISCONTINUED | OUTPATIENT
Start: 2019-02-26 | End: 2019-02-26

## 2019-02-26 RX ADMIN — METOPROLOL TARTRATE 5 MG: 5 INJECTION INTRAVENOUS at 12:49

## 2019-02-26 RX ADMIN — SODIUM CHLORIDE, PRESERVATIVE FREE 3 ML: 5 INJECTION INTRAVENOUS at 21:30

## 2019-02-26 RX ADMIN — CARVEDILOL 6.25 MG: 6.25 TABLET, FILM COATED ORAL at 21:27

## 2019-02-26 RX ADMIN — DIGOXIN 500 MCG: 0.25 INJECTION INTRAMUSCULAR; INTRAVENOUS at 13:15

## 2019-02-26 RX ADMIN — RIVAROXABAN 20 MG: 20 TABLET, FILM COATED ORAL at 21:28

## 2019-02-26 RX ADMIN — DIGOXIN 250 MCG: 0.25 INJECTION INTRAMUSCULAR; INTRAVENOUS at 18:52

## 2019-02-26 RX ADMIN — FLUTICASONE PROPIONATE 2 SPRAY: 50 SPRAY, METERED NASAL at 21:28

## 2019-02-26 RX ADMIN — METOPROLOL TARTRATE 5 MG: 5 INJECTION INTRAVENOUS at 11:30

## 2019-02-26 RX ADMIN — ROSUVASTATIN CALCIUM 40 MG: 40 TABLET, FILM COATED ORAL at 21:28

## 2019-02-26 NOTE — TELEPHONE ENCOUNTER
02/26/19  9:02 AM  Vicente Baker  1950    Home Phone [phone]   Mobile [phone]       Vicente Baker is a patient of Dr. Barrios, and the PCP office of Bindu Mace, THELMA, is calling saying the pt came in yesterday dyspneic w/fatigue and back in a. Fib. W/a rate of 150.    BP at time of visit was 120/90.    She increased his carvedilol from 6.25mg to 12.5mg BID.    He is also on furosemide 20mg daily until tomorrow (duration of 3 days).    Does he need to be seen today?    Does he need another med adjustment?    Cassidy Ho RN

## 2019-02-26 NOTE — TELEPHONE ENCOUNTER
Dr Barrios and I reviewed his chart, 2 recent ED visits and overnight stay, now in AF w/RVR-he should go ED

## 2019-02-27 LAB
ANION GAP SERPL CALCULATED.3IONS-SCNC: 10.7 MMOL/L
B PARAPERT DNA SPEC QL NAA+PROBE: NOT DETECTED
B PERT DNA SPEC QL NAA+PROBE: NOT DETECTED
BACTERIA SPEC AEROBE CULT: NORMAL
BUN BLD-MCNC: 18 MG/DL (ref 8–23)
BUN/CREAT SERPL: 16.7 (ref 7–25)
C PNEUM DNA NPH QL NAA+NON-PROBE: NOT DETECTED
CALCIUM SPEC-SCNC: 9.4 MG/DL (ref 8.6–10.5)
CHLORIDE SERPL-SCNC: 104 MMOL/L (ref 98–107)
CO2 SERPL-SCNC: 24.3 MMOL/L (ref 22–29)
CREAT BLD-MCNC: 1.08 MG/DL (ref 0.76–1.27)
DEPRECATED RDW RBC AUTO: 48.2 FL (ref 37–54)
ERYTHROCYTE [DISTWIDTH] IN BLOOD BY AUTOMATED COUNT: 13.3 % (ref 12.3–15.4)
FLUAV H1 2009 PAND RNA NPH QL NAA+PROBE: NOT DETECTED
FLUAV H1 HA GENE NPH QL NAA+PROBE: NOT DETECTED
FLUAV H3 RNA NPH QL NAA+PROBE: NOT DETECTED
FLUAV SUBTYP SPEC NAA+PROBE: NOT DETECTED
FLUBV RNA ISLT QL NAA+PROBE: NOT DETECTED
GFR SERPL CREATININE-BSD FRML MDRD: 68 ML/MIN/1.73
GLUCOSE BLD-MCNC: 107 MG/DL (ref 65–99)
HADV DNA SPEC NAA+PROBE: NOT DETECTED
HCOV 229E RNA SPEC QL NAA+PROBE: NOT DETECTED
HCOV HKU1 RNA SPEC QL NAA+PROBE: NOT DETECTED
HCOV NL63 RNA SPEC QL NAA+PROBE: NOT DETECTED
HCOV OC43 RNA SPEC QL NAA+PROBE: NOT DETECTED
HCT VFR BLD AUTO: 43.3 % (ref 37.5–51)
HGB BLD-MCNC: 14.5 G/DL (ref 13–17.7)
HMPV RNA NPH QL NAA+NON-PROBE: NOT DETECTED
HPIV1 RNA SPEC QL NAA+PROBE: NOT DETECTED
HPIV2 RNA SPEC QL NAA+PROBE: NOT DETECTED
HPIV3 RNA NPH QL NAA+PROBE: NOT DETECTED
HPIV4 P GENE NPH QL NAA+PROBE: NOT DETECTED
M PNEUMO IGG SER IA-ACNC: NOT DETECTED
MCH RBC QN AUTO: 33 PG (ref 26.6–33)
MCHC RBC AUTO-ENTMCNC: 33.5 G/DL (ref 31.5–35.7)
MCV RBC AUTO: 98.6 FL (ref 79–97)
PLATELET # BLD AUTO: 197 10*3/MM3 (ref 140–450)
PMV BLD AUTO: 11.3 FL (ref 6–12)
POTASSIUM BLD-SCNC: 4 MMOL/L (ref 3.5–5.2)
RBC # BLD AUTO: 4.39 10*6/MM3 (ref 4.14–5.8)
RHINOVIRUS RNA SPEC NAA+PROBE: NOT DETECTED
RSV RNA NPH QL NAA+NON-PROBE: NOT DETECTED
SODIUM BLD-SCNC: 139 MMOL/L (ref 136–145)
WBC NRBC COR # BLD: 4.74 10*3/MM3 (ref 3.4–10.8)

## 2019-02-27 PROCEDURE — 87798 DETECT AGENT NOS DNA AMP: CPT | Performed by: INTERNAL MEDICINE

## 2019-02-27 PROCEDURE — 93010 ELECTROCARDIOGRAM REPORT: CPT | Performed by: INTERNAL MEDICINE

## 2019-02-27 PROCEDURE — 87486 CHLMYD PNEUM DNA AMP PROBE: CPT | Performed by: INTERNAL MEDICINE

## 2019-02-27 PROCEDURE — 90791 PSYCH DIAGNOSTIC EVALUATION: CPT | Performed by: SOCIAL WORKER

## 2019-02-27 PROCEDURE — 25010000002 DIGOXIN PER 500 MCG: Performed by: NURSE PRACTITIONER

## 2019-02-27 PROCEDURE — 87631 RESP VIRUS 3-5 TARGETS: CPT | Performed by: INTERNAL MEDICINE

## 2019-02-27 PROCEDURE — G0378 HOSPITAL OBSERVATION PER HR: HCPCS

## 2019-02-27 PROCEDURE — 87581 M.PNEUMON DNA AMP PROBE: CPT | Performed by: INTERNAL MEDICINE

## 2019-02-27 PROCEDURE — 93005 ELECTROCARDIOGRAM TRACING: CPT | Performed by: NURSE PRACTITIONER

## 2019-02-27 PROCEDURE — 85027 COMPLETE CBC AUTOMATED: CPT | Performed by: NURSE PRACTITIONER

## 2019-02-27 PROCEDURE — 80048 BASIC METABOLIC PNL TOTAL CA: CPT | Performed by: NURSE PRACTITIONER

## 2019-02-27 PROCEDURE — 99213 OFFICE O/P EST LOW 20 MIN: CPT | Performed by: NURSE PRACTITIONER

## 2019-02-27 PROCEDURE — 96376 TX/PRO/DX INJ SAME DRUG ADON: CPT

## 2019-02-27 RX ORDER — ZOLPIDEM TARTRATE 5 MG/1
2.5 TABLET ORAL NIGHTLY PRN
Status: DISCONTINUED | OUTPATIENT
Start: 2019-02-27 | End: 2019-02-28 | Stop reason: HOSPADM

## 2019-02-27 RX ORDER — CARVEDILOL 12.5 MG/1
12.5 TABLET ORAL 2 TIMES DAILY WITH MEALS
Status: DISCONTINUED | OUTPATIENT
Start: 2019-02-27 | End: 2019-02-28 | Stop reason: HOSPADM

## 2019-02-27 RX ORDER — UREA 10 %
3 LOTION (ML) TOPICAL ONCE
Status: COMPLETED | OUTPATIENT
Start: 2019-02-27 | End: 2019-02-27

## 2019-02-27 RX ORDER — DIGOXIN 0.25 MG/ML
250 INJECTION INTRAMUSCULAR; INTRAVENOUS ONCE
Status: COMPLETED | OUTPATIENT
Start: 2019-02-27 | End: 2019-02-27

## 2019-02-27 RX ORDER — LORAZEPAM 0.5 MG/1
0.5 TABLET ORAL EVERY 6 HOURS PRN
Status: DISCONTINUED | OUTPATIENT
Start: 2019-02-27 | End: 2019-02-28 | Stop reason: HOSPADM

## 2019-02-27 RX ADMIN — SODIUM CHLORIDE, PRESERVATIVE FREE 3 ML: 5 INJECTION INTRAVENOUS at 08:15

## 2019-02-27 RX ADMIN — Medication 3 MG: at 02:41

## 2019-02-27 RX ADMIN — OXYCODONE HYDROCHLORIDE AND ACETAMINOPHEN 1000 MG: 500 TABLET ORAL at 08:59

## 2019-02-27 RX ADMIN — DIGOXIN 250 MCG: 0.25 INJECTION INTRAMUSCULAR; INTRAVENOUS at 18:11

## 2019-02-27 RX ADMIN — SODIUM CHLORIDE, PRESERVATIVE FREE 3 ML: 5 INJECTION INTRAVENOUS at 20:38

## 2019-02-27 RX ADMIN — CARVEDILOL 6.25 MG: 6.25 TABLET, FILM COATED ORAL at 08:59

## 2019-02-27 RX ADMIN — FLUTICASONE PROPIONATE 2 SPRAY: 50 SPRAY, METERED NASAL at 08:59

## 2019-02-27 RX ADMIN — VITAMIN D, TAB 1000IU (100/BT) 1000 UNITS: 25 TAB at 08:59

## 2019-02-27 RX ADMIN — RIVAROXABAN 20 MG: 20 TABLET, FILM COATED ORAL at 18:11

## 2019-02-27 RX ADMIN — ASPIRIN 81 MG: 81 TABLET, DELAYED RELEASE ORAL at 08:59

## 2019-02-27 RX ADMIN — ZOLPIDEM TARTRATE 2.5 MG: 5 TABLET ORAL at 22:50

## 2019-02-27 RX ADMIN — CARVEDILOL 12.5 MG: 12.5 TABLET, FILM COATED ORAL at 18:11

## 2019-02-28 VITALS
DIASTOLIC BLOOD PRESSURE: 81 MMHG | HEIGHT: 66 IN | HEART RATE: 87 BPM | RESPIRATION RATE: 16 BRPM | WEIGHT: 190 LBS | OXYGEN SATURATION: 96 % | TEMPERATURE: 98.1 F | SYSTOLIC BLOOD PRESSURE: 123 MMHG | BODY MASS INDEX: 30.53 KG/M2

## 2019-02-28 PROBLEM — G47.01 INSOMNIA DUE TO MEDICAL CONDITION: Status: ACTIVE | Noted: 2019-02-28

## 2019-02-28 LAB
ANION GAP SERPL CALCULATED.3IONS-SCNC: 12.2 MMOL/L
BUN BLD-MCNC: 17 MG/DL (ref 8–23)
BUN/CREAT SERPL: 15.2 (ref 7–25)
CALCIUM SPEC-SCNC: 9.8 MG/DL (ref 8.6–10.5)
CHLORIDE SERPL-SCNC: 102 MMOL/L (ref 98–107)
CO2 SERPL-SCNC: 25.8 MMOL/L (ref 22–29)
CREAT BLD-MCNC: 1.12 MG/DL (ref 0.76–1.27)
GFR SERPL CREATININE-BSD FRML MDRD: 66 ML/MIN/1.73
GLUCOSE BLD-MCNC: 104 MG/DL (ref 65–99)
POTASSIUM BLD-SCNC: 4 MMOL/L (ref 3.5–5.2)
SODIUM BLD-SCNC: 140 MMOL/L (ref 136–145)

## 2019-02-28 PROCEDURE — 80048 BASIC METABOLIC PNL TOTAL CA: CPT | Performed by: INTERNAL MEDICINE

## 2019-02-28 PROCEDURE — G0378 HOSPITAL OBSERVATION PER HR: HCPCS

## 2019-02-28 RX ORDER — CARVEDILOL 12.5 MG/1
12.5 TABLET ORAL 2 TIMES DAILY WITH MEALS
Qty: 60 TABLET | Refills: 0 | Status: ON HOLD | OUTPATIENT
Start: 2019-02-28 | End: 2019-05-13

## 2019-02-28 RX ORDER — ZOLPIDEM TARTRATE 5 MG/1
2.5 TABLET ORAL NIGHTLY PRN
Qty: 7 TABLET | Refills: 0 | Status: SHIPPED | OUTPATIENT
Start: 2019-02-28 | End: 2019-03-11

## 2019-02-28 RX ORDER — FLUTICASONE PROPIONATE 50 MCG
2 SPRAY, SUSPENSION (ML) NASAL DAILY
Qty: 1 BOTTLE | Refills: 0 | Status: SHIPPED | OUTPATIENT
Start: 2019-03-01 | End: 2019-03-11

## 2019-02-28 RX ADMIN — OXYCODONE HYDROCHLORIDE AND ACETAMINOPHEN 1000 MG: 500 TABLET ORAL at 08:14

## 2019-02-28 RX ADMIN — VITAMIN D, TAB 1000IU (100/BT) 1000 UNITS: 25 TAB at 08:14

## 2019-02-28 RX ADMIN — FLUTICASONE PROPIONATE 2 SPRAY: 50 SPRAY, METERED NASAL at 08:14

## 2019-02-28 RX ADMIN — CARVEDILOL 12.5 MG: 12.5 TABLET, FILM COATED ORAL at 08:14

## 2019-02-28 RX ADMIN — ASPIRIN 81 MG: 81 TABLET, DELAYED RELEASE ORAL at 08:14

## 2019-03-03 LAB
BACTERIA SPEC AEROBE CULT: NORMAL
BACTERIA SPEC AEROBE CULT: NORMAL

## 2019-03-11 ENCOUNTER — OFFICE VISIT (OUTPATIENT)
Dept: CARDIOLOGY | Facility: CLINIC | Age: 69
End: 2019-03-11

## 2019-03-11 VITALS
WEIGHT: 191 LBS | HEIGHT: 66 IN | SYSTOLIC BLOOD PRESSURE: 132 MMHG | DIASTOLIC BLOOD PRESSURE: 80 MMHG | BODY MASS INDEX: 30.7 KG/M2 | HEART RATE: 76 BPM

## 2019-03-11 DIAGNOSIS — Z98.890 HISTORY OF CARDIAC RADIOFREQUENCY ABLATION: ICD-10-CM

## 2019-03-11 DIAGNOSIS — I48.3 TYPICAL ATRIAL FLUTTER (HCC): ICD-10-CM

## 2019-03-11 DIAGNOSIS — I48.19 PERSISTENT ATRIAL FIBRILLATION (HCC): Primary | ICD-10-CM

## 2019-03-11 PROCEDURE — 99213 OFFICE O/P EST LOW 20 MIN: CPT | Performed by: NURSE PRACTITIONER

## 2019-03-11 PROCEDURE — 93000 ELECTROCARDIOGRAM COMPLETE: CPT | Performed by: NURSE PRACTITIONER

## 2019-04-09 ENCOUNTER — TELEPHONE (OUTPATIENT)
Dept: CARDIOLOGY | Facility: CLINIC | Age: 69
End: 2019-04-09

## 2019-04-09 NOTE — TELEPHONE ENCOUNTER
----- Message from Ainsley Gee MA sent at 4/9/2019 10:28 AM EDT -----  Regarding: RE: appt  Yes TY...Ainsley    ----- Message -----  From: Fabiola Garner MA  Sent: 4/8/2019   3:06 PM  To: Ainsley Gee MA  Subject: RE: appt                                         So Anila said he needs to come in next week and see JM or with her on a day he is here. Sophie does not have any afternoon pts on his days next week. Do you just want to add him on then?    Fabiola    ----- Message -----  From: Anila Spivey APRN  Sent: 4/8/2019   8:45 AM  To: Fabiola Garner MA  Subject: RE: appt                                         He needs to be seen on a day JM is here-he has to decide if he is going to schedule for ablation -he usually sends an email-if he feels better then perhaps we can wait until 4/22 appt but if he does not call or send email by 12pm then lets call him and see how he is doing.    ----- Message -----  From: Fabiola Garner MA  Sent: 4/8/2019   8:21 AM  To: DERIK Devine  Subject: RE: appt                                         We can do tomorrow at 1030 or do we need to give it a little more time.    Fabiola      ----- Message -----  From: Anila Spivey APRN  Sent: 4/5/2019  12:24 PM  To: Fabiola Garner MA  Subject: santana Hicks,    I called him so I did not reply to his last patient email-can we see if we can find a time to move his appt up to see him?    I had him decrease his carvedilol to 6.25mg BID and he is going to let me know on Monday if it makes him feel better.     Anila

## 2019-04-15 ENCOUNTER — OFFICE VISIT (OUTPATIENT)
Dept: CARDIOLOGY | Facility: CLINIC | Age: 69
End: 2019-04-15

## 2019-04-15 VITALS
SYSTOLIC BLOOD PRESSURE: 142 MMHG | HEIGHT: 66 IN | HEART RATE: 91 BPM | DIASTOLIC BLOOD PRESSURE: 100 MMHG | BODY MASS INDEX: 30.37 KG/M2 | WEIGHT: 189 LBS

## 2019-04-15 DIAGNOSIS — Z95.5 STATUS POST INSERTION OF DRUG-ELUTING STENT INTO LEFT ANTERIOR DESCENDING ARTERY: ICD-10-CM

## 2019-04-15 DIAGNOSIS — I48.19 PERSISTENT ATRIAL FIBRILLATION (HCC): Primary | ICD-10-CM

## 2019-04-15 PROCEDURE — 93000 ELECTROCARDIOGRAM COMPLETE: CPT | Performed by: INTERNAL MEDICINE

## 2019-04-15 PROCEDURE — 99214 OFFICE O/P EST MOD 30 MIN: CPT | Performed by: INTERNAL MEDICINE

## 2019-04-15 RX ORDER — CARVEDILOL 6.25 MG/1
6.25 TABLET ORAL 2 TIMES DAILY WITH MEALS
COMMUNITY
Start: 2019-04-05 | End: 2019-05-15 | Stop reason: HOSPADM

## 2019-04-15 NOTE — PROGRESS NOTES
Date of Office Visit: 04/15/2019  Encounter Provider: Aaron Barrios MD  Place of Service: [facility] CARDIOLOGY  Patient Name: Vicente Baker  : 1950    Subjective:     Encounter Date:04/15/2019      Patient ID: Vicente Baker is a 68 y.o. male who has a cc of persistent AF --- and I did a flutter ablation in 2018  He feels less fatigue and less brownlee but still is not normal. He was worse on carvedilol 12.5 bid     He feels irregular palp in his chest that are transient, mild and not assoc with chest pain.     No anginal chest pain,   Mod brownlee,   No soa,   No fainting,  No orthostasis.   No edema.   Exercise tolerance: he does yard work.,     There have been no hospital admission since the last visit.     There have been no bleeding events.       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       Social History     Socioeconomic History   • Marital status:      Spouse name: Not on file   • Number of children: Not on file   • Years of education: Not on file   • Highest education level: Not on file   Tobacco Use   • Smoking status: Former Smoker     Packs/day: 0.50     Years: 37.00     Pack years: 18.50     Types: Cigarettes   • Smokeless tobacco: Never Used   • Tobacco comment: caffeine use   Substance and Sexual Activity   • Alcohol use: No     Alcohol/week: 1.2 oz     Types: 2 Glasses of wine per week     Frequency: Never     Comment: social   • Drug use: Defer   • Sexual activity: Defer       Review of Systems   Constitution: Negative for fever and night sweats.   HENT: Negative for ear pain and stridor.    Eyes: Negative for discharge and visual halos.   Cardiovascular: Negative for cyanosis.   Respiratory: Negative for hemoptysis and  "sputum production.    Hematologic/Lymphatic: Negative for adenopathy.   Skin: Negative for nail changes and unusual hair distribution.   Musculoskeletal: Negative for gout and joint swelling.   Gastrointestinal: Negative for bowel incontinence and flatus.   Genitourinary: Negative for dysuria and flank pain.   Neurological: Negative for seizures and tremors.   Psychiatric/Behavioral: Negative for altered mental status. The patient is not nervous/anxious.             Objective:     Vitals:    04/15/19 1329   BP: 142/100   BP Location: Right arm   Patient Position: Sitting   Cuff Size: Large Adult   Pulse: 91   Weight: 85.7 kg (189 lb)   Height: 167.6 cm (66\")         Physical Exam   Constitutional: He is oriented to person, place, and time.   HENT:   Head: Normocephalic and atraumatic.   Eyes: Right eye exhibits no discharge. Left eye exhibits no discharge.   Neck: No JVD present. No thyromegaly present.   Cardiovascular: Normal rate. An irregularly irregular rhythm present. Exam reveals no gallop and no friction rub.   No murmur heard.  Pulmonary/Chest: Effort normal and breath sounds normal. He has no rales.   Abdominal: Soft. Bowel sounds are normal. There is no tenderness.   Musculoskeletal: Normal range of motion. He exhibits no edema or deformity.   Neurological: He is alert and oriented to person, place, and time. He exhibits normal muscle tone.   Skin: Skin is warm and dry. No erythema.   Psychiatric: He has a normal mood and affect. His behavior is normal. Thought content normal.         ECG 12 Lead  Date/Time: 4/15/2019 1:49 PM  Performed by: Aaron Barrios MD  Authorized by: Aaron Barrios MD   Comparison: compared with previous ECG   Similar to previous ECG  Rhythm: atrial fibrillation    Clinical impression: abnormal EKG            Lab Review:       Assessment:          Diagnosis Plan   1. Persistent atrial fibrillation (CMS/HCC)     2. Status post insertion of drug-eluting stent into left anterior " descending artery            Plan:       .Atrial Fibrillation and Atrial Flutter  Assessment  • The patient has persistent atrial fibrillation  • This is non-valvular in etiology  • The patient's CHADS2-VASc score is 1  • A [id number] score of 1 is considered an intermediate risk for a thromboembolic event    Plan  • Continue rivaroxaban for antithrombotic therapy, bleeding issues discussed  • Continue beta blocker for rate control    He is symptomatic -- we will try a dofetilide trial then ablation     Risks discussed

## 2019-05-13 ENCOUNTER — HOSPITAL ENCOUNTER (INPATIENT)
Facility: HOSPITAL | Age: 69
LOS: 2 days | Discharge: HOME | End: 2019-05-15
Attending: INTERNAL MEDICINE | Admitting: INTERNAL MEDICINE

## 2019-05-13 DIAGNOSIS — I48.19 PERSISTENT ATRIAL FIBRILLATION (HCC): Primary | ICD-10-CM

## 2019-05-13 PROBLEM — I48.91 A-FIB: Status: ACTIVE | Noted: 2019-05-13

## 2019-05-13 LAB
ALBUMIN SERPL-MCNC: 4.3 G/DL (ref 3.5–5.2)
ALBUMIN/GLOB SERPL: 1.8 G/DL
ALP SERPL-CCNC: 87 U/L (ref 39–117)
ALT SERPL W P-5'-P-CCNC: 27 U/L (ref 1–41)
ANION GAP SERPL CALCULATED.3IONS-SCNC: 11.2 MMOL/L
AST SERPL-CCNC: 21 U/L (ref 1–40)
BASOPHILS # BLD AUTO: 0.07 10*3/MM3 (ref 0–0.2)
BASOPHILS NFR BLD AUTO: 1.3 % (ref 0–1.5)
BILIRUB SERPL-MCNC: 0.4 MG/DL (ref 0.2–1.2)
BUN BLD-MCNC: 23 MG/DL (ref 8–23)
BUN/CREAT SERPL: 20.2 (ref 7–25)
CALCIUM SPEC-SCNC: 9.3 MG/DL (ref 8.6–10.5)
CHLORIDE SERPL-SCNC: 101 MMOL/L (ref 98–107)
CO2 SERPL-SCNC: 25.8 MMOL/L (ref 22–29)
CREAT BLD-MCNC: 1.14 MG/DL (ref 0.76–1.27)
DEPRECATED RDW RBC AUTO: 48.7 FL (ref 37–54)
EOSINOPHIL # BLD AUTO: 0.17 10*3/MM3 (ref 0–0.4)
EOSINOPHIL NFR BLD AUTO: 3.1 % (ref 0.3–6.2)
ERYTHROCYTE [DISTWIDTH] IN BLOOD BY AUTOMATED COUNT: 13.1 % (ref 12.3–15.4)
GFR SERPL CREATININE-BSD FRML MDRD: 64 ML/MIN/1.73
GLOBULIN UR ELPH-MCNC: 2.4 GM/DL
GLUCOSE BLD-MCNC: 111 MG/DL (ref 65–99)
HCT VFR BLD AUTO: 46.7 % (ref 37.5–51)
HGB BLD-MCNC: 15.3 G/DL (ref 13–17.7)
IMM GRANULOCYTES # BLD AUTO: 0.01 10*3/MM3 (ref 0–0.05)
IMM GRANULOCYTES NFR BLD AUTO: 0.2 % (ref 0–0.5)
LYMPHOCYTES # BLD AUTO: 1.75 10*3/MM3 (ref 0.7–3.1)
LYMPHOCYTES NFR BLD AUTO: 31.6 % (ref 19.6–45.3)
MAGNESIUM SERPL-MCNC: 2.3 MG/DL (ref 1.6–2.4)
MCH RBC QN AUTO: 33 PG (ref 26.6–33)
MCHC RBC AUTO-ENTMCNC: 32.8 G/DL (ref 31.5–35.7)
MCV RBC AUTO: 100.9 FL (ref 79–97)
MONOCYTES # BLD AUTO: 0.49 10*3/MM3 (ref 0.1–0.9)
MONOCYTES NFR BLD AUTO: 8.9 % (ref 5–12)
NEUTROPHILS # BLD AUTO: 3.04 10*3/MM3 (ref 1.7–7)
NEUTROPHILS NFR BLD AUTO: 54.9 % (ref 42.7–76)
NRBC BLD AUTO-RTO: 0 /100 WBC (ref 0–0.2)
PLATELET # BLD AUTO: 181 10*3/MM3 (ref 140–450)
PMV BLD AUTO: 11 FL (ref 6–12)
POTASSIUM BLD-SCNC: 4.5 MMOL/L (ref 3.5–5.2)
PROT SERPL-MCNC: 6.7 G/DL (ref 6–8.5)
RBC # BLD AUTO: 4.63 10*6/MM3 (ref 4.14–5.8)
SODIUM BLD-SCNC: 138 MMOL/L (ref 136–145)
WBC NRBC COR # BLD: 5.53 10*3/MM3 (ref 3.4–10.8)

## 2019-05-13 PROCEDURE — 93005 ELECTROCARDIOGRAM TRACING: CPT | Performed by: NURSE PRACTITIONER

## 2019-05-13 PROCEDURE — 80053 COMPREHEN METABOLIC PANEL: CPT | Performed by: NURSE PRACTITIONER

## 2019-05-13 PROCEDURE — 93010 ELECTROCARDIOGRAM REPORT: CPT | Performed by: INTERNAL MEDICINE

## 2019-05-13 PROCEDURE — 93005 ELECTROCARDIOGRAM TRACING: CPT | Performed by: INTERNAL MEDICINE

## 2019-05-13 PROCEDURE — 85025 COMPLETE CBC W/AUTO DIFF WBC: CPT | Performed by: NURSE PRACTITIONER

## 2019-05-13 PROCEDURE — 83735 ASSAY OF MAGNESIUM: CPT | Performed by: NURSE PRACTITIONER

## 2019-05-13 RX ORDER — ECHINACEA PURPUREA EXTRACT 125 MG
1 TABLET ORAL AS NEEDED
Status: DISCONTINUED | OUTPATIENT
Start: 2019-05-13 | End: 2019-05-15 | Stop reason: HOSPADM

## 2019-05-13 RX ORDER — NITROGLYCERIN 0.4 MG/1
0.4 TABLET SUBLINGUAL
Status: DISCONTINUED | OUTPATIENT
Start: 2019-05-13 | End: 2019-05-15 | Stop reason: HOSPADM

## 2019-05-13 RX ORDER — MELATONIN
1000 DAILY
Status: DISCONTINUED | OUTPATIENT
Start: 2019-05-13 | End: 2019-05-15 | Stop reason: HOSPADM

## 2019-05-13 RX ORDER — CARVEDILOL 6.25 MG/1
6.25 TABLET ORAL 2 TIMES DAILY WITH MEALS
Status: DISCONTINUED | OUTPATIENT
Start: 2019-05-13 | End: 2019-05-15 | Stop reason: HOSPADM

## 2019-05-13 RX ORDER — ATORVASTATIN CALCIUM 80 MG/1
80 TABLET, FILM COATED ORAL DAILY
COMMUNITY
End: 2019-12-20

## 2019-05-13 RX ORDER — ASPIRIN 81 MG/1
81 TABLET ORAL DAILY
Status: DISCONTINUED | OUTPATIENT
Start: 2019-05-13 | End: 2019-05-15 | Stop reason: HOSPADM

## 2019-05-13 RX ORDER — ATORVASTATIN CALCIUM 80 MG/1
80 TABLET, FILM COATED ORAL DAILY
Status: DISCONTINUED | OUTPATIENT
Start: 2019-05-13 | End: 2019-05-15 | Stop reason: HOSPADM

## 2019-05-13 RX ORDER — DOFETILIDE 0.5 MG/1
500 CAPSULE ORAL EVERY 12 HOURS
Status: DISCONTINUED | OUTPATIENT
Start: 2019-05-13 | End: 2019-05-15 | Stop reason: HOSPADM

## 2019-05-13 RX ORDER — ACETAMINOPHEN 325 MG/1
650 TABLET ORAL EVERY 4 HOURS PRN
Status: DISCONTINUED | OUTPATIENT
Start: 2019-05-13 | End: 2019-05-15 | Stop reason: HOSPADM

## 2019-05-13 RX ADMIN — CARVEDILOL 6.25 MG: 6.25 TABLET, FILM COATED ORAL at 19:11

## 2019-05-13 RX ADMIN — RIVAROXABAN 20 MG: 20 TABLET, FILM COATED ORAL at 19:11

## 2019-05-13 RX ADMIN — DOFETILIDE 500 MCG: 0.5 CAPSULE ORAL at 12:01

## 2019-05-13 RX ADMIN — ATORVASTATIN CALCIUM 80 MG: 80 TABLET, FILM COATED ORAL at 19:11

## 2019-05-13 RX ADMIN — ACETAMINOPHEN 650 MG: 325 TABLET, FILM COATED ORAL at 14:35

## 2019-05-13 NOTE — PLAN OF CARE
Problem: Patient Care Overview  Goal: Plan of Care Review  Outcome: Ongoing (interventions implemented as appropriate)   05/13/19 1247   Coping/Psychosocial   Plan of Care Reviewed With patient;spouse   Plan of Care Review   Progress no change   OTHER   Outcome Summary Pt started Tikosyn. . First dose given at 1200. No complaints. Will continue to monitor.        Problem: Arrhythmia/Dysrhythmia (Symptomatic) (Adult)  Goal: Signs and Symptoms of Listed Potential Problems Will be Absent, Minimized or Managed (Arrhythmia/Dysrhythmia)  Outcome: Ongoing (interventions implemented as appropriate)   05/13/19 1247   Goal/Outcome Evaluation   Problems Assessed (Arrhythmia/Dysrhythmia) all   Problems Present (Dysrhythmia) electrophysiologic conduction defect

## 2019-05-14 LAB
ANION GAP SERPL CALCULATED.3IONS-SCNC: 9 MMOL/L
BUN BLD-MCNC: 17 MG/DL (ref 8–23)
BUN/CREAT SERPL: 20.5 (ref 7–25)
CALCIUM SPEC-SCNC: 8.7 MG/DL (ref 8.6–10.5)
CHLORIDE SERPL-SCNC: 101 MMOL/L (ref 98–107)
CO2 SERPL-SCNC: 26 MMOL/L (ref 22–29)
CREAT BLD-MCNC: 0.83 MG/DL (ref 0.76–1.27)
GFR SERPL CREATININE-BSD FRML MDRD: 93 ML/MIN/1.73
GLUCOSE BLD-MCNC: 83 MG/DL (ref 65–99)
POTASSIUM BLD-SCNC: 3.9 MMOL/L (ref 3.5–5.2)
SODIUM BLD-SCNC: 136 MMOL/L (ref 136–145)

## 2019-05-14 PROCEDURE — 5A2204Z RESTORATION OF CARDIAC RHYTHM, SINGLE: ICD-10-PCS | Performed by: INTERNAL MEDICINE

## 2019-05-14 PROCEDURE — 93010 ELECTROCARDIOGRAM REPORT: CPT | Performed by: INTERNAL MEDICINE

## 2019-05-14 PROCEDURE — 80048 BASIC METABOLIC PNL TOTAL CA: CPT | Performed by: NURSE PRACTITIONER

## 2019-05-14 PROCEDURE — 93005 ELECTROCARDIOGRAM TRACING: CPT | Performed by: INTERNAL MEDICINE

## 2019-05-14 RX ADMIN — ATORVASTATIN CALCIUM 80 MG: 80 TABLET, FILM COATED ORAL at 18:33

## 2019-05-14 RX ADMIN — RIVAROXABAN 20 MG: 20 TABLET, FILM COATED ORAL at 18:33

## 2019-05-14 RX ADMIN — CARVEDILOL 6.25 MG: 6.25 TABLET, FILM COATED ORAL at 08:44

## 2019-05-14 RX ADMIN — SODIUM CHLORIDE 250 ML: 9 INJECTION, SOLUTION INTRAVENOUS at 13:33

## 2019-05-14 RX ADMIN — ASPIRIN 81 MG: 81 TABLET, DELAYED RELEASE ORAL at 08:44

## 2019-05-14 RX ADMIN — VITAMIN D, TAB 1000IU (100/BT) 1000 UNITS: 25 TAB at 08:44

## 2019-05-14 RX ADMIN — DOFETILIDE 500 MCG: 0.5 CAPSULE ORAL at 12:16

## 2019-05-14 RX ADMIN — ACETAMINOPHEN 650 MG: 325 TABLET, FILM COATED ORAL at 18:36

## 2019-05-14 RX ADMIN — DOFETILIDE 500 MCG: 0.5 CAPSULE ORAL at 00:00

## 2019-05-14 NOTE — PLAN OF CARE
Problem: Patient Care Overview  Goal: Plan of Care Review  Outcome: Ongoing (interventions implemented as appropriate)   05/14/19 0221   Coping/Psychosocial   Plan of Care Reviewed With patient;spouse   Plan of Care Review   Progress no change   OTHER   Outcome Summary Second dose of tikosyn given at 0000. Pt still in afib. RA. No complaints of pain. Will continue to monitor.        Problem: Arrhythmia/Dysrhythmia (Symptomatic) (Adult)  Goal: Signs and Symptoms of Listed Potential Problems Will be Absent, Minimized or Managed (Arrhythmia/Dysrhythmia)  Outcome: Ongoing (interventions implemented as appropriate)   05/14/19 0221   Goal/Outcome Evaluation   Problems Assessed (Arrhythmia/Dysrhythmia) all   Problems Present (Dysrhythmia) electrophysiologic conduction defect;situational response

## 2019-05-14 NOTE — NURSING NOTE
Pt SBP mid eighties, HR 40s, APRN notified. ECG reviewed, APRN confirmed administration of 1200 Tikosyn dose.

## 2019-05-15 VITALS
RESPIRATION RATE: 16 BRPM | WEIGHT: 189.9 LBS | OXYGEN SATURATION: 97 % | SYSTOLIC BLOOD PRESSURE: 127 MMHG | BODY MASS INDEX: 30.52 KG/M2 | HEART RATE: 41 BPM | TEMPERATURE: 98.7 F | HEIGHT: 66 IN | DIASTOLIC BLOOD PRESSURE: 90 MMHG

## 2019-05-15 LAB
ANION GAP SERPL CALCULATED.3IONS-SCNC: 9.2 MMOL/L
BUN BLD-MCNC: 29 MG/DL (ref 8–23)
BUN/CREAT SERPL: 20.3 (ref 7–25)
CALCIUM SPEC-SCNC: 8.5 MG/DL (ref 8.6–10.5)
CHLORIDE SERPL-SCNC: 105 MMOL/L (ref 98–107)
CO2 SERPL-SCNC: 25.8 MMOL/L (ref 22–29)
CREAT BLD-MCNC: 1.43 MG/DL (ref 0.76–1.27)
GFR SERPL CREATININE-BSD FRML MDRD: 49 ML/MIN/1.73
GLUCOSE BLD-MCNC: 97 MG/DL (ref 65–99)
MAGNESIUM SERPL-MCNC: 2.3 MG/DL (ref 1.6–2.4)
POTASSIUM BLD-SCNC: 4.3 MMOL/L (ref 3.5–5.2)
SODIUM BLD-SCNC: 140 MMOL/L (ref 136–145)

## 2019-05-15 PROCEDURE — 99238 HOSP IP/OBS DSCHRG MGMT 30/<: CPT | Performed by: NURSE PRACTITIONER

## 2019-05-15 PROCEDURE — 83735 ASSAY OF MAGNESIUM: CPT | Performed by: INTERNAL MEDICINE

## 2019-05-15 PROCEDURE — 80048 BASIC METABOLIC PNL TOTAL CA: CPT | Performed by: NURSE PRACTITIONER

## 2019-05-15 PROCEDURE — 93005 ELECTROCARDIOGRAM TRACING: CPT | Performed by: INTERNAL MEDICINE

## 2019-05-15 PROCEDURE — 93010 ELECTROCARDIOGRAM REPORT: CPT | Performed by: INTERNAL MEDICINE

## 2019-05-15 RX ORDER — CARVEDILOL 3.12 MG/1
6.25 TABLET ORAL 2 TIMES DAILY WITH MEALS
Qty: 30 TABLET | Refills: 5 | Status: SHIPPED | OUTPATIENT
Start: 2019-05-15 | End: 2019-05-16 | Stop reason: SDUPTHER

## 2019-05-15 RX ORDER — DOFETILIDE 0.5 MG/1
500 CAPSULE ORAL EVERY 12 HOURS
Qty: 60 CAPSULE | Refills: 5 | Status: SHIPPED | OUTPATIENT
Start: 2019-05-16 | End: 2019-05-18 | Stop reason: HOSPADM

## 2019-05-15 RX ORDER — DOFETILIDE 0.5 MG/1
500 CAPSULE ORAL EVERY 12 HOURS
Qty: 14 CAPSULE | Refills: 0 | Status: SHIPPED | OUTPATIENT
Start: 2019-05-15 | End: 2019-05-16 | Stop reason: SDUPTHER

## 2019-05-15 RX ADMIN — DOFETILIDE 500 MCG: 0.5 CAPSULE ORAL at 00:03

## 2019-05-15 RX ADMIN — DOFETILIDE 500 MCG: 0.5 CAPSULE ORAL at 11:14

## 2019-05-15 RX ADMIN — ASPIRIN 81 MG: 81 TABLET, DELAYED RELEASE ORAL at 08:59

## 2019-05-15 NOTE — PLAN OF CARE
Problem: Patient Care Overview  Goal: Plan of Care Review  Outcome: Ongoing (interventions implemented as appropriate)   05/14/19 2050   Coping/Psychosocial   Plan of Care Reviewed With patient;spouse   Plan of Care Review   Progress improving   OTHER   Outcome Summary Pt SBP improving slowly. Evening coreg dose held. Pt hypotension asymptomatic. Will continue to monitor.

## 2019-05-15 NOTE — DISCHARGE SUMMARY
DISCHARGE NOTE    Patient Name: Vicente Baker  Age/Sex: 68 y.o. male  : 1950  MRN: [id number]    Date of Discharge:  5/15/2019   Date of Admit: 2019  Encounter Provider: DERIK Devine  Place of Service: [facility] CARDIOLOGY  Patient Care Team:  Betty Schultz MD as PCP - General (Internal Medicine)    Subjective:     Discharge Diagnosis:    A-fib (CMS/[facility])      Hospital Course:     68-year-old male patient of Dr. Ashby's and Dr. Barrios's with persistent atrial fib.  He underwent cardioversion but converted back to A. fib fairly quickly so we admitted him for initiation of dofetilide.  He came in on  drug was started he underwent successful cardioversion yesterday and has maintained sinus rhythm since cardioversion.  He has been bradycardic so we are going to decrease his carvedilol to 3.125 twice a day but his QTC has been fine on the 500 mcg every 12 hours of dofetilide.  His creatinine bumped up this morning which is likely due to the fact that he had some hypotension yesterday post cardioversion that has resolved and I suspect that his creatinine will return to normal but we will check this on Friday and he will also come to the office for an EKG on Friday.  He has been up ambulating and has done fine he is stable for discharge home we will follow-up in the office with EKG on Friday and see him in the office in 4 weeks.    Vital Signs  Temp:  [98.4 °F (36.9 °C)-98.7 °F (37.1 °C)] 98.7 °F (37.1 °C)  Heart Rate:  [40-56] 41  Resp:  [16] 16  BP: ()/(53-90) 127/90    Intake/Output Summary (Last 24 hours) at 5/15/2019 1600  Last data filed at 5/15/2019 1227  Gross per 24 hour   Intake 440 ml   Output --   Net 440 ml       Physical Exam:    General Appearance: No acute distress, well developed and well nourished.   Eyes: Conjunctiva and lids: No  erythema, swelling, or discharge. Sclera non-icteric.   HENT: Atraumatic, normocephalic. External eyes, ears, and nose normal.   Respiratory: No signs of respiratory distress. Respiration rhythm and depth normal.   Clear to auscultation. No rales, crackles, rhonchi, or wheezing auscultated.   Cardiovascular:  Jugular Venous Pressure: Normal  Heart Rate and Rhythm: Normal, Heart Sounds: Normal S1 and S2. No S3 or S4 noted.  Murmurs: No murmurs noted. No rubs, thrills, or gallops.   Arterial Pulses: Posterior tibialis and dorsalis pedis pulses normal.   Lower Extremities: No edema noted.  Gastrointestinal:  Abdomen soft, non-distended, non-tender.  Musculoskeletal: Normal movement of extremities  Skin: Warm and dry.   Psychiatric: Patient alert and oriented to person, place, and time. Speech and behavior appropriate. Normal mood and affect.    Labs:   Results from last 7 days   Lab Units 05/15/19  0406 05/14/19  0320 05/13/19  0857   SODIUM mmol/L 140 136 138   POTASSIUM mmol/L 4.3 3.9 4.5   CHLORIDE mmol/L 105 101 101   CO2 mmol/L 25.8 26.0 25.8   BUN mg/dL 29* 17 23   CREATININE mg/dL 1.43* 0.83 1.14   GLUCOSE mg/dL 97 83 111*   CALCIUM mg/dL 8.5* 8.7 9.3   AST (SGOT) U/L  --   --  21   ALT (SGPT) U/L  --   --  27         Results from last 7 days   Lab Units 05/13/19  0857   WBC 10*3/mm3 5.53   HEMOGLOBIN g/dL 15.3   HEMATOCRIT % 46.7   PLATELETS 10*3/mm3 181         Results from last 7 days   Lab Units 05/15/19  0406 05/13/19  0857   MAGNESIUM mg/dL 2.3 2.3                   Discharge Diet:    Dietary Orders (From admission, onward)    Start     Ordered    05/14/19 1124  Diet Regular  Diet Effective Now     Question:  Diet Texture / Consistency  Answer:  Regular    05/14/19 1124            Activity at Discharge:  As tolerated    Discharge Medications     Discharge Medications      New Medications      Instructions Start Date   dofetilide 500 MCG capsule  Commonly known as:  TIKOSYN   500 mcg, Oral, Every 12 Hours       dofetilide 500 MCG capsule  Commonly known as:  TIKOSYN   500 mcg, Oral, Every 12 Hours   Start Date:  5/16/2019        Changes to Medications      Instructions Start Date   carvedilol 3.125 MG tablet  Commonly known as:  COREG  What changed:    · medication strength  · how to take this   6.25 mg, Oral, 2 Times Daily With Meals         Continue These Medications      Instructions Start Date   ascorbic acid 1000 MG tablet  Commonly known as:  VITAMIN C   1,000 mg, Oral, Daily      aspirin 81 MG tablet   81 mg, Oral, Daily      atorvastatin 80 MG tablet  Commonly known as:  LIPITOR   80 mg, Oral, Daily      CENTRUM SILVER tablet   1 tablet, Oral, Daily      cholecalciferol 1000 units tablet  Commonly known as:  VITAMIN D3   1,000 Units, Oral, Daily      rivaroxaban 20 MG tablet  Commonly known as:  XARELTO   20 mg, Oral, Daily With Dinner             Discharge disposition: home    Follow-up Appointments  Additional Instructions for the Follow-ups that You Need to Schedule     Basic Metabolic Panel   May 17, 2019        Follow-up Information     Anila Spivey APRN Follow up in 4 week(s).    Specialty:  Cardiology  Contact information:  [facility] [place] [address]  [phone]                 Future Appointments   Date Time Provider Department Center   6/17/2019 10:30 AM Lima Alvarez PA [facility] None   7/18/2019 10:00 AM Anila Spivey APRN [facility] None         DERIK Devine  05/15/19  4:00 PM

## 2019-05-15 NOTE — PROGRESS NOTES
Discharge Planning Assessment  [facility]     Patient Name: Vicente Baker  MRN: [id number]  Today's Date: 5/15/2019    Admit Date: 5/13/2019    Discharge Needs Assessment     Row Name 05/15/19 1235       Living Environment    Lives With  spouse    Current Living Arrangements  home/apartment/condo    Primary Care Provided by  self    Provides Primary Care For  no one    Family Caregiver if Needed  spouse    Family Caregiver Names  Cayla Almazan    Quality of Family Relationships  helpful;involved    Able to Return to Prior Arrangements  yes       Resource/Environmental Concerns    Resource/Environmental Concerns  none    Transportation Concerns  car, none       Transition Planning    Patient/Family Anticipates Transition to  home with family    Patient/Family Anticipated Services at Transition  none    Transportation Anticipated  family or friend will provide       Discharge Needs Assessment    Concerns to be Addressed  no discharge needs identified;denies needs/concerns at this time    Equipment Currently Used at Home  none    Anticipated Changes Related to Illness  none    Equipment Needed After Discharge  none    Discharge Coordination/Progress  home; no needs        Discharge Plan     Row Name 05/15/19 1236       Plan    Plan  home; check cost of Tikosyn at d/c.    Patient/Family in Agreement with Plan  yes    Plan Comments  Met with pt and pt's spouse Barry macdonald. Confirmed facesheet correct. Explained role of CCP. Pt lives with his wife. Pt is IADLs no DME used to ambulate. Pt's PCP is Dr. Schultz and he uses [facility] on [address] [facility] [employer] [place] for prescriptions no issues. Pt has never used HH or SNF in past. Pt plans home and denies current d/c needs. CCP to follow…VDIA Beckman        Destination      No service coordination in this encounter.      Durable Medical Equipment      No service coordination in this encounter.      Dialysis/Infusion      No service coordination in this encounter.      Home Medical Care       No service coordination in this encounter.      Therapy      No service coordination in this encounter.      Community Resources      No service coordination in this encounter.          Demographic Summary     Row Name 05/15/19 1234       General Information    Admission Type  inpatient    Required Notices Provided  Important Message from Medicare    Reason for Consult  discharge planning    Preferred Language  English     Used During This Interaction  no       Contact Information    Permission Granted to Share Info With  facility ;family/designee        Functional Status     Row Name 05/15/19 1235       Functional Status    Usual Activity Tolerance  good    Current Activity Tolerance  good       Functional Status, IADL    Medications  independent    Meal Preparation  independent    Housekeeping  independent    Laundry  independent    Shopping  independent       Mental Status    General Appearance WDL  WDL       Mental Status Summary    Recent Changes in Mental Status/Cognitive Functioning  no changes        Psychosocial    No documentation.       Abuse/Neglect    No documentation.       Legal    No documentation.       Substance Abuse    No documentation.       Patient Forms    No documentation.           Verenice Pitts

## 2019-05-16 ENCOUNTER — READMISSION MANAGEMENT (OUTPATIENT)
Dept: CALL CENTER | Facility: HOSPITAL | Age: 69
End: 2019-05-16

## 2019-05-16 ENCOUNTER — CLINICAL SUPPORT (OUTPATIENT)
Dept: CARDIOLOGY | Facility: CLINIC | Age: 69
End: 2019-05-16

## 2019-05-16 ENCOUNTER — LAB (OUTPATIENT)
Dept: LAB | Facility: HOSPITAL | Age: 69
End: 2019-05-16

## 2019-05-16 ENCOUNTER — TELEPHONE (OUTPATIENT)
Dept: CARDIOLOGY | Facility: CLINIC | Age: 69
End: 2019-05-16

## 2019-05-16 VITALS — SYSTOLIC BLOOD PRESSURE: 110 MMHG | DIASTOLIC BLOOD PRESSURE: 70 MMHG | HEART RATE: 37 BPM

## 2019-05-16 DIAGNOSIS — I48.19 PERSISTENT ATRIAL FIBRILLATION (HCC): Primary | ICD-10-CM

## 2019-05-16 DIAGNOSIS — I48.19 PERSISTENT ATRIAL FIBRILLATION (HCC): ICD-10-CM

## 2019-05-16 LAB
ANION GAP SERPL CALCULATED.3IONS-SCNC: 10.7 MMOL/L
BUN BLD-MCNC: 18 MG/DL (ref 8–23)
BUN/CREAT SERPL: 17 (ref 7–25)
CALCIUM SPEC-SCNC: 9.5 MG/DL (ref 8.6–10.5)
CHLORIDE SERPL-SCNC: 107 MMOL/L (ref 98–107)
CO2 SERPL-SCNC: 24.3 MMOL/L (ref 22–29)
CREAT BLD-MCNC: 1.06 MG/DL (ref 0.76–1.27)
GFR SERPL CREATININE-BSD FRML MDRD: 70 ML/MIN/1.73
GLUCOSE BLD-MCNC: 94 MG/DL (ref 65–99)
POTASSIUM BLD-SCNC: 4.8 MMOL/L (ref 3.5–5.2)
SODIUM BLD-SCNC: 142 MMOL/L (ref 136–145)

## 2019-05-16 PROCEDURE — 93000 ELECTROCARDIOGRAM COMPLETE: CPT | Performed by: NURSE PRACTITIONER

## 2019-05-16 PROCEDURE — 36415 COLL VENOUS BLD VENIPUNCTURE: CPT

## 2019-05-16 PROCEDURE — 80048 BASIC METABOLIC PNL TOTAL CA: CPT

## 2019-05-16 RX ORDER — CARVEDILOL 3.12 MG/1
6.25 TABLET ORAL 2 TIMES DAILY WITH MEALS
Qty: 120 TABLET | Refills: 5 | Status: SHIPPED | OUTPATIENT
Start: 2019-05-16 | End: 2019-05-18 | Stop reason: HOSPADM

## 2019-05-16 NOTE — PROGRESS NOTES
Procedure     ECG 12 Lead  Date/Time: 5/16/2019 11:48 AM  Performed by: Anila Spivey APRN  Authorized by: Anila Spivey APRN   Comparison: compared with previous ECG from 5/15/2019  Similar to previous ECG  Rhythm: sinus bradycardia  BPM: 37  Comments: QTc normal, reviewed by Dr. Barrios also        complaining of shortness of breath when he lays down,    B/p 110/70, Oxygen sat 95% baseline, walking oximetry, up to 97% and 94% at end of walk. His HR went up to 85 with walking, shortness of breath mild with walking. EKG fine, awaiting lab results, will call when available.

## 2019-05-16 NOTE — PROGRESS NOTES
The patient came in today for an EKG check post cardioversion and starting Dofetilide. His medications were reconciled.     His EKG was performed and his BP was 110/70 HR 37. He is complaining of SOA when lying down.    The patient was walked around the office per Anila Spivey. His O2 Sat raised from 95% to 97% and then dropped to 94%    This was addressed with Anila Spivey.    She will call patient with his lab results tomorrow. And said he EKG looked okay for him to leave.

## 2019-05-16 NOTE — TELEPHONE ENCOUNTER
Regarding: FW: Non-Urgent Medical Question  Contact: [phone]      ----- Message -----  From: Vicente Baker  Sent: 5/16/2019  12:53 PM  To: [facility]  Subject: Non-Urgent Medical Question                      ----- Message from Mychart, Generic sent at 5/16/2019 12:53 PM EDT -----    while my heart rate is low at 37 should I stop taking  my carvedilol 3.125 . I can't imagine what it is while I sleep tonight. When it is low is when I have breathing problems please advise. I also noticed I don't have a Lab appointment tomorrow as it was stated in the office Are you going to see me tomorrow if so what time?

## 2019-05-16 NOTE — TELEPHONE ENCOUNTER
I called an spoke with patient. He is going to hold carvedilol and he did not have labs done, he is going to come either today or tomrrow and get them done. He does not need an EKG tomorrow since he came today.

## 2019-05-17 ENCOUNTER — HOSPITAL ENCOUNTER (INPATIENT)
Facility: HOSPITAL | Age: 69
LOS: 1 days | Discharge: HOME | End: 2019-05-18
Attending: EMERGENCY MEDICINE | Admitting: INTERNAL MEDICINE

## 2019-05-17 ENCOUNTER — APPOINTMENT (OUTPATIENT)
Dept: GENERAL RADIOLOGY | Facility: HOSPITAL | Age: 69
End: 2019-05-17

## 2019-05-17 ENCOUNTER — READMISSION MANAGEMENT (OUTPATIENT)
Dept: CALL CENTER | Facility: HOSPITAL | Age: 69
End: 2019-05-17

## 2019-05-17 DIAGNOSIS — I48.19 PERSISTENT ATRIAL FIBRILLATION (HCC): ICD-10-CM

## 2019-05-17 DIAGNOSIS — J81.0 ACUTE PULMONARY EDEMA (HCC): ICD-10-CM

## 2019-05-17 DIAGNOSIS — R06.00 DYSPNEA, UNSPECIFIED TYPE: ICD-10-CM

## 2019-05-17 DIAGNOSIS — R00.1 SINUS BRADYCARDIA: Primary | ICD-10-CM

## 2019-05-17 LAB
ALBUMIN SERPL-MCNC: 4.5 G/DL (ref 3.5–5.2)
ALBUMIN/GLOB SERPL: 1.8 G/DL
ALP SERPL-CCNC: 101 U/L (ref 39–117)
ALT SERPL W P-5'-P-CCNC: 57 U/L (ref 1–41)
ANION GAP SERPL CALCULATED.3IONS-SCNC: 11.4 MMOL/L
AST SERPL-CCNC: 43 U/L (ref 1–40)
BASOPHILS # BLD AUTO: 0.07 10*3/MM3 (ref 0–0.2)
BASOPHILS NFR BLD AUTO: 1 % (ref 0–1.5)
BILIRUB SERPL-MCNC: 0.7 MG/DL (ref 0.2–1.2)
BUN BLD-MCNC: 25 MG/DL (ref 8–23)
BUN/CREAT SERPL: 20.7 (ref 7–25)
CALCIUM SPEC-SCNC: 9.2 MG/DL (ref 8.6–10.5)
CHLORIDE SERPL-SCNC: 107 MMOL/L (ref 98–107)
CO2 SERPL-SCNC: 25.6 MMOL/L (ref 22–29)
CREAT BLD-MCNC: 1.21 MG/DL (ref 0.76–1.27)
DEPRECATED RDW RBC AUTO: 53.2 FL (ref 37–54)
EOSINOPHIL # BLD AUTO: 0.14 10*3/MM3 (ref 0–0.4)
EOSINOPHIL NFR BLD AUTO: 2 % (ref 0.3–6.2)
ERYTHROCYTE [DISTWIDTH] IN BLOOD BY AUTOMATED COUNT: 13.7 % (ref 12.3–15.4)
GFR SERPL CREATININE-BSD FRML MDRD: 60 ML/MIN/1.73
GLOBULIN UR ELPH-MCNC: 2.5 GM/DL
GLUCOSE BLD-MCNC: 105 MG/DL (ref 65–99)
HCT VFR BLD AUTO: 43.1 % (ref 37.5–51)
HGB BLD-MCNC: 12.5 G/DL (ref 13–17.7)
HGB BLD-MCNC: 13.6 G/DL (ref 13–17.7)
IMM GRANULOCYTES # BLD AUTO: 0.02 10*3/MM3 (ref 0–0.05)
IMM GRANULOCYTES NFR BLD AUTO: 0.3 % (ref 0–0.5)
LYMPHOCYTES # BLD AUTO: 1.33 10*3/MM3 (ref 0.7–3.1)
LYMPHOCYTES NFR BLD AUTO: 18.7 % (ref 19.6–45.3)
MCH RBC QN AUTO: 32.8 PG (ref 26.6–33)
MCHC RBC AUTO-ENTMCNC: 31.6 G/DL (ref 31.5–35.7)
MCV RBC AUTO: 103.9 FL (ref 79–97)
MONOCYTES # BLD AUTO: 0.51 10*3/MM3 (ref 0.1–0.9)
MONOCYTES NFR BLD AUTO: 7.2 % (ref 5–12)
NEUTROPHILS # BLD AUTO: 5.04 10*3/MM3 (ref 1.7–7)
NEUTROPHILS NFR BLD AUTO: 70.8 % (ref 42.7–76)
NRBC BLD AUTO-RTO: 0 /100 WBC (ref 0–0.2)
NT-PROBNP SERPL-MCNC: 527.3 PG/ML (ref 5–900)
PLATELET # BLD AUTO: 171 10*3/MM3 (ref 140–450)
PMV BLD AUTO: 11.8 FL (ref 6–12)
POTASSIUM BLD-SCNC: 4.1 MMOL/L (ref 3.5–5.2)
PROT SERPL-MCNC: 7 G/DL (ref 6–8.5)
RBC # BLD AUTO: 4.15 10*6/MM3 (ref 4.14–5.8)
SODIUM BLD-SCNC: 144 MMOL/L (ref 136–145)
TROPONIN T SERPL-MCNC: <0.01 NG/ML (ref 0–0.03)
TROPONIN T SERPL-MCNC: <0.01 NG/ML (ref 0–0.03)
WBC NRBC COR # BLD: 7.11 10*3/MM3 (ref 3.4–10.8)

## 2019-05-17 PROCEDURE — 25010000002 HEPARIN (PORCINE) PER 1000 UNITS: Performed by: INTERNAL MEDICINE

## 2019-05-17 PROCEDURE — 99152 MOD SED SAME PHYS/QHP 5/>YRS: CPT | Performed by: INTERNAL MEDICINE

## 2019-05-17 PROCEDURE — G0378 HOSPITAL OBSERVATION PER HR: HCPCS

## 2019-05-17 PROCEDURE — 25010000002 FUROSEMIDE PER 20 MG: Performed by: NURSE PRACTITIONER

## 2019-05-17 PROCEDURE — 84484 ASSAY OF TROPONIN QUANT: CPT | Performed by: EMERGENCY MEDICINE

## 2019-05-17 PROCEDURE — 93458 L HRT ARTERY/VENTRICLE ANGIO: CPT | Performed by: INTERNAL MEDICINE

## 2019-05-17 PROCEDURE — 85347 COAGULATION TIME ACTIVATED: CPT

## 2019-05-17 PROCEDURE — 99223 1ST HOSP IP/OBS HIGH 75: CPT | Performed by: NURSE PRACTITIONER

## 2019-05-17 PROCEDURE — 92928 PRQ TCAT PLMT NTRAC ST 1 LES: CPT | Performed by: INTERNAL MEDICINE

## 2019-05-17 PROCEDURE — 84484 ASSAY OF TROPONIN QUANT: CPT | Performed by: NURSE PRACTITIONER

## 2019-05-17 PROCEDURE — 93005 ELECTROCARDIOGRAM TRACING: CPT | Performed by: INTERNAL MEDICINE

## 2019-05-17 PROCEDURE — C1725 CATH, TRANSLUMIN NON-LASER: HCPCS | Performed by: INTERNAL MEDICINE

## 2019-05-17 PROCEDURE — 71046 X-RAY EXAM CHEST 2 VIEWS: CPT

## 2019-05-17 PROCEDURE — 93005 ELECTROCARDIOGRAM TRACING: CPT | Performed by: NURSE PRACTITIONER

## 2019-05-17 PROCEDURE — 99285 EMERGENCY DEPT VISIT HI MDM: CPT

## 2019-05-17 PROCEDURE — 93010 ELECTROCARDIOGRAM REPORT: CPT | Performed by: INTERNAL MEDICINE

## 2019-05-17 PROCEDURE — B2111ZZ FLUOROSCOPY OF MULTIPLE CORONARY ARTERIES USING LOW OSMOLAR CONTRAST: ICD-10-PCS | Performed by: INTERNAL MEDICINE

## 2019-05-17 PROCEDURE — 0 IOPAMIDOL PER 1 ML: Performed by: INTERNAL MEDICINE

## 2019-05-17 PROCEDURE — 4A023N7 MEASUREMENT OF CARDIAC SAMPLING AND PRESSURE, LEFT HEART, PERCUTANEOUS APPROACH: ICD-10-PCS | Performed by: INTERNAL MEDICINE

## 2019-05-17 PROCEDURE — 027034Z DILATION OF CORONARY ARTERY, ONE ARTERY WITH DRUG-ELUTING INTRALUMINAL DEVICE, PERCUTANEOUS APPROACH: ICD-10-PCS | Performed by: INTERNAL MEDICINE

## 2019-05-17 PROCEDURE — C9600 PERC DRUG-EL COR STENT SING: HCPCS | Performed by: INTERNAL MEDICINE

## 2019-05-17 PROCEDURE — 25010000002 FENTANYL CITRATE (PF) 100 MCG/2ML SOLUTION: Performed by: INTERNAL MEDICINE

## 2019-05-17 PROCEDURE — 63710000001 DIPHENHYDRAMINE PER 50 MG: Performed by: NURSE PRACTITIONER

## 2019-05-17 PROCEDURE — 25010000002 MIDAZOLAM PER 1 MG: Performed by: INTERNAL MEDICINE

## 2019-05-17 PROCEDURE — C1874 STENT, COATED/COV W/DEL SYS: HCPCS | Performed by: INTERNAL MEDICINE

## 2019-05-17 PROCEDURE — 99153 MOD SED SAME PHYS/QHP EA: CPT | Performed by: INTERNAL MEDICINE

## 2019-05-17 PROCEDURE — 93005 ELECTROCARDIOGRAM TRACING: CPT | Performed by: EMERGENCY MEDICINE

## 2019-05-17 PROCEDURE — C1769 GUIDE WIRE: HCPCS | Performed by: INTERNAL MEDICINE

## 2019-05-17 PROCEDURE — B2151ZZ FLUOROSCOPY OF LEFT HEART USING LOW OSMOLAR CONTRAST: ICD-10-PCS | Performed by: INTERNAL MEDICINE

## 2019-05-17 PROCEDURE — 83880 ASSAY OF NATRIURETIC PEPTIDE: CPT | Performed by: EMERGENCY MEDICINE

## 2019-05-17 PROCEDURE — C1887 CATHETER, GUIDING: HCPCS | Performed by: INTERNAL MEDICINE

## 2019-05-17 PROCEDURE — 80053 COMPREHEN METABOLIC PANEL: CPT | Performed by: EMERGENCY MEDICINE

## 2019-05-17 PROCEDURE — 85018 HEMOGLOBIN: CPT | Performed by: INTERNAL MEDICINE

## 2019-05-17 PROCEDURE — 85025 COMPLETE CBC W/AUTO DIFF WBC: CPT | Performed by: EMERGENCY MEDICINE

## 2019-05-17 PROCEDURE — C1894 INTRO/SHEATH, NON-LASER: HCPCS | Performed by: INTERNAL MEDICINE

## 2019-05-17 DEVICE — XIENCE SIERRA™ EVEROLIMUS ELUTING CORONARY STENT SYSTEM 3.50 MM X 18 MM / RAPID-EXCHANGE
Type: IMPLANTABLE DEVICE | Status: FUNCTIONAL
Brand: XIENCE SIERRA™

## 2019-05-17 RX ORDER — CLOPIDOGREL BISULFATE 75 MG/1
75 TABLET ORAL DAILY
Status: DISCONTINUED | OUTPATIENT
Start: 2019-05-18 | End: 2019-05-18 | Stop reason: HOSPADM

## 2019-05-17 RX ORDER — ATORVASTATIN CALCIUM 80 MG/1
80 TABLET, FILM COATED ORAL NIGHTLY
Status: DISCONTINUED | OUTPATIENT
Start: 2019-05-17 | End: 2019-05-18 | Stop reason: HOSPADM

## 2019-05-17 RX ORDER — DIPHENHYDRAMINE HCL 25 MG
25 CAPSULE ORAL NIGHTLY PRN
Status: DISCONTINUED | OUTPATIENT
Start: 2019-05-17 | End: 2019-05-18 | Stop reason: HOSPADM

## 2019-05-17 RX ORDER — SODIUM CHLORIDE 9 MG/ML
INJECTION, SOLUTION INTRAVENOUS CONTINUOUS PRN
Status: COMPLETED | OUTPATIENT
Start: 2019-05-17 | End: 2019-05-17

## 2019-05-17 RX ORDER — ASPIRIN 81 MG/1
81 TABLET ORAL DAILY
Status: DISCONTINUED | OUTPATIENT
Start: 2019-05-17 | End: 2019-05-17

## 2019-05-17 RX ORDER — ACETAMINOPHEN 325 MG/1
650 TABLET ORAL EVERY 6 HOURS PRN
Status: DISCONTINUED | OUTPATIENT
Start: 2019-05-17 | End: 2019-05-18 | Stop reason: HOSPADM

## 2019-05-17 RX ORDER — FUROSEMIDE 10 MG/ML
20 INJECTION INTRAMUSCULAR; INTRAVENOUS ONCE
Status: COMPLETED | OUTPATIENT
Start: 2019-05-17 | End: 2019-05-17

## 2019-05-17 RX ORDER — ECHINACEA PURPUREA EXTRACT 125 MG
1 TABLET ORAL AS NEEDED
Status: DISCONTINUED | OUTPATIENT
Start: 2019-05-17 | End: 2019-05-18 | Stop reason: HOSPADM

## 2019-05-17 RX ORDER — DOFETILIDE 0.5 MG/1
500 CAPSULE ORAL 2 TIMES DAILY
Status: DISCONTINUED | OUTPATIENT
Start: 2019-05-17 | End: 2019-05-17

## 2019-05-17 RX ORDER — SODIUM CHLORIDE 0.9 % (FLUSH) 0.9 %
10 SYRINGE (ML) INJECTION AS NEEDED
Status: DISCONTINUED | OUTPATIENT
Start: 2019-05-17 | End: 2019-05-18 | Stop reason: HOSPADM

## 2019-05-17 RX ORDER — LIDOCAINE HYDROCHLORIDE 20 MG/ML
INJECTION, SOLUTION INFILTRATION; PERINEURAL AS NEEDED
Status: DISCONTINUED | OUTPATIENT
Start: 2019-05-17 | End: 2019-05-17 | Stop reason: HOSPADM

## 2019-05-17 RX ORDER — ASPIRIN 81 MG/1
TABLET, CHEWABLE ORAL AS NEEDED
Status: DISCONTINUED | OUTPATIENT
Start: 2019-05-17 | End: 2019-05-17 | Stop reason: HOSPADM

## 2019-05-17 RX ORDER — CLOPIDOGREL BISULFATE 75 MG/1
TABLET ORAL AS NEEDED
Status: DISCONTINUED | OUTPATIENT
Start: 2019-05-17 | End: 2019-05-17 | Stop reason: HOSPADM

## 2019-05-17 RX ORDER — FENTANYL CITRATE 50 UG/ML
INJECTION, SOLUTION INTRAMUSCULAR; INTRAVENOUS AS NEEDED
Status: DISCONTINUED | OUTPATIENT
Start: 2019-05-17 | End: 2019-05-17 | Stop reason: HOSPADM

## 2019-05-17 RX ORDER — MIDAZOLAM HYDROCHLORIDE 1 MG/ML
INJECTION INTRAMUSCULAR; INTRAVENOUS AS NEEDED
Status: DISCONTINUED | OUTPATIENT
Start: 2019-05-17 | End: 2019-05-17 | Stop reason: HOSPADM

## 2019-05-17 RX ORDER — HEPARIN SODIUM 1000 [USP'U]/ML
INJECTION, SOLUTION INTRAVENOUS; SUBCUTANEOUS AS NEEDED
Status: DISCONTINUED | OUTPATIENT
Start: 2019-05-17 | End: 2019-05-17 | Stop reason: HOSPADM

## 2019-05-17 RX ORDER — SODIUM CHLORIDE 9 MG/ML
100 INJECTION, SOLUTION INTRAVENOUS CONTINUOUS
Status: DISCONTINUED | OUTPATIENT
Start: 2019-05-17 | End: 2019-05-17

## 2019-05-17 RX ADMIN — SODIUM CHLORIDE 100 ML/HR: 9 INJECTION, SOLUTION INTRAVENOUS at 14:40

## 2019-05-17 RX ADMIN — DIPHENHYDRAMINE HYDROCHLORIDE 25 MG: 25 CAPSULE ORAL at 22:34

## 2019-05-17 RX ADMIN — ATORVASTATIN CALCIUM 80 MG: 80 TABLET, FILM COATED ORAL at 22:14

## 2019-05-17 RX ADMIN — ACETAMINOPHEN 650 MG: 325 TABLET, FILM COATED ORAL at 22:34

## 2019-05-17 RX ADMIN — FUROSEMIDE 20 MG: 10 INJECTION, SOLUTION INTRAMUSCULAR; INTRAVENOUS at 18:37

## 2019-05-17 RX ADMIN — RIVAROXABAN 20 MG: 20 TABLET, FILM COATED ORAL at 18:11

## 2019-05-17 RX ADMIN — DOFETILIDE 500 MCG: 0.5 CAPSULE ORAL at 15:47

## 2019-05-17 NOTE — OUTREACH NOTE
Medical Week 1 Survey      Responses   Facility patient discharged from?  [facility]   Does the patient have one of the following disease processes/diagnoses(primary or secondary)?  Other   Is there a successful TCM telephone encounter documented?  No   Week 1 attempt successful?  No   Revoke  Readmitted          Lashanda Ledezma RN

## 2019-05-17 NOTE — OUTREACH NOTE
Prep Survey      Responses   Facility patient discharged from?  [facility]   Is patient eligible?  Yes   Discharge diagnosis  Afib   Does the patient have one of the following disease processes/diagnoses(primary or secondary)?  Other   Does the patient have Home health ordered?  No   Is there a DME ordered?  No   Prep survey completed?  Yes          Yesi Sue RN

## 2019-05-18 VITALS
SYSTOLIC BLOOD PRESSURE: 114 MMHG | OXYGEN SATURATION: 96 % | HEART RATE: 37 BPM | DIASTOLIC BLOOD PRESSURE: 71 MMHG | BODY MASS INDEX: 29.97 KG/M2 | RESPIRATION RATE: 16 BRPM | WEIGHT: 186.5 LBS | TEMPERATURE: 97.6 F | HEIGHT: 66 IN

## 2019-05-18 LAB
ANION GAP SERPL CALCULATED.3IONS-SCNC: 12 MMOL/L
BUN BLD-MCNC: 15 MG/DL (ref 8–23)
BUN/CREAT SERPL: 14.3 (ref 7–25)
CALCIUM SPEC-SCNC: 8.9 MG/DL (ref 8.6–10.5)
CHLORIDE SERPL-SCNC: 105 MMOL/L (ref 98–107)
CO2 SERPL-SCNC: 24 MMOL/L (ref 22–29)
CREAT BLD-MCNC: 1.05 MG/DL (ref 0.76–1.27)
DEPRECATED RDW RBC AUTO: 50.2 FL (ref 37–54)
ERYTHROCYTE [DISTWIDTH] IN BLOOD BY AUTOMATED COUNT: 13.6 % (ref 12.3–15.4)
GFR SERPL CREATININE-BSD FRML MDRD: 71 ML/MIN/1.73
GLUCOSE BLD-MCNC: 97 MG/DL (ref 65–99)
HCT VFR BLD AUTO: 38.8 % (ref 37.5–51)
HGB BLD-MCNC: 12.8 G/DL (ref 13–17.7)
MCH RBC QN AUTO: 33.1 PG (ref 26.6–33)
MCHC RBC AUTO-ENTMCNC: 33 G/DL (ref 31.5–35.7)
MCV RBC AUTO: 100.3 FL (ref 79–97)
PLATELET # BLD AUTO: 153 10*3/MM3 (ref 140–450)
PMV BLD AUTO: 12 FL (ref 6–12)
POTASSIUM BLD-SCNC: 3.9 MMOL/L (ref 3.5–5.2)
RBC # BLD AUTO: 3.87 10*6/MM3 (ref 4.14–5.8)
SODIUM BLD-SCNC: 141 MMOL/L (ref 136–145)
WBC NRBC COR # BLD: 5.04 10*3/MM3 (ref 3.4–10.8)

## 2019-05-18 PROCEDURE — 99239 HOSP IP/OBS DSCHRG MGMT >30: CPT | Performed by: INTERNAL MEDICINE

## 2019-05-18 PROCEDURE — 93010 ELECTROCARDIOGRAM REPORT: CPT | Performed by: INTERNAL MEDICINE

## 2019-05-18 PROCEDURE — 93005 ELECTROCARDIOGRAM TRACING: CPT | Performed by: NURSE PRACTITIONER

## 2019-05-18 PROCEDURE — 80048 BASIC METABOLIC PNL TOTAL CA: CPT | Performed by: INTERNAL MEDICINE

## 2019-05-18 PROCEDURE — 85027 COMPLETE CBC AUTOMATED: CPT | Performed by: INTERNAL MEDICINE

## 2019-05-18 RX ORDER — ALPRAZOLAM 0.25 MG/1
0.25 TABLET ORAL NIGHTLY PRN
Qty: 10 TABLET | Refills: 0 | Status: SHIPPED | OUTPATIENT
Start: 2019-05-18

## 2019-05-18 RX ORDER — CLOPIDOGREL BISULFATE 75 MG/1
75 TABLET ORAL DAILY
Qty: 90 TABLET | Refills: 3 | OUTPATIENT
Start: 2019-05-19 | End: 2020-10-27

## 2019-05-18 RX ADMIN — CLOPIDOGREL 75 MG: 75 TABLET, FILM COATED ORAL at 09:20

## 2019-05-20 LAB — ACT BLD: 323 SECONDS (ref 82–152)

## 2019-05-22 ENCOUNTER — OFFICE VISIT (OUTPATIENT)
Dept: CARDIOLOGY | Facility: CLINIC | Age: 69
End: 2019-05-22

## 2019-05-22 VITALS
BODY MASS INDEX: 30.05 KG/M2 | HEIGHT: 66 IN | DIASTOLIC BLOOD PRESSURE: 80 MMHG | SYSTOLIC BLOOD PRESSURE: 130 MMHG | WEIGHT: 187 LBS | HEART RATE: 48 BPM | OXYGEN SATURATION: 98 %

## 2019-05-22 DIAGNOSIS — I48.0 PAROXYSMAL ATRIAL FIBRILLATION (HCC): Primary | ICD-10-CM

## 2019-05-22 DIAGNOSIS — I25.10 CAD IN NATIVE ARTERY: ICD-10-CM

## 2019-05-22 PROBLEM — I48.91 A-FIB (HCC): Status: RESOLVED | Noted: 2019-05-13 | Resolved: 2019-05-22

## 2019-05-22 PROBLEM — I48.19 PERSISTENT ATRIAL FIBRILLATION: Status: RESOLVED | Noted: 2019-02-12 | Resolved: 2019-05-22

## 2019-05-22 PROBLEM — I48.91 ATRIAL FIBRILLATION WITH RVR (HCC): Status: RESOLVED | Noted: 2019-02-26 | Resolved: 2019-05-22

## 2019-05-22 PROCEDURE — 93000 ELECTROCARDIOGRAM COMPLETE: CPT | Performed by: PHYSICIAN ASSISTANT

## 2019-05-22 PROCEDURE — 99213 OFFICE O/P EST LOW 20 MIN: CPT | Performed by: PHYSICIAN ASSISTANT

## 2019-05-22 NOTE — PROGRESS NOTES
Date of Office Visit: 2019  Encounter Provider: BENJAMIN Saha  Place of Service: [facility] CARDIOLOGY  Patient Name: Vicente Baker  :1950    Chief Complaint   Patient presents with   • Coronary Artery Disease     1 week hospital follow up   :     HPI: Vicente Baker is a 68 y.o. male who presents today for follow-up.  Old records have been obtained and reviewed by me.  He is a patient of Dr. Barrios's and Dr. Elmore.  He has coronary disease with a stent placed to his proximal circumflex in .  He had an atrial flutter ablation and 2018.  He has had episodes of atrial fibrillation with RVR.  He is been anticoagulated.  He was cardioverted in 2019 successfully.  This was on 2019.  He then came back to the ER on 2019 and was felt to be dehydrated with some mild alcoholic ketosis.  He then saw his PCP on 2019 and was in atrial fibrillation in the setting of an acute respiratory viral illness.  He was rate controlled.  Ultimately per Dr. Barrios's recommendations he was admitted on 2019 for initiation of dofetilide.  He was then cardioverted successfully on 2019 and discharged home on 500 mcg of dofetilide every 12 hours.  He was a little bradycardic after his cardioversion and his carvedilol was decreased.  He presented to the emergency room on 2019 with complaints of shortness of breath.  He ultimately underwent cardiac catheterization on 2019.  This showed a 30% distal left main, 30% proximal LAD, 50% focal mid LAD, 40% proximal diagonal, widely patent circumflex stent, and 80% proximal RCA.  He underwent successful drug-eluting stent placement to the proximal RCA lesion.  Recommendations were for dual antiplatelet therapy with rivaroxaban and clopidogrel and to discontinue the aspirin.  He is here today for follow-up.   Since he's been home he has been feeling much better.  His shortness of breath is  greatly improved.  He denies any chest pain, palpitations, edema, dizziness, or syncope.  He is having some difficulty sleeping.  He got set up with a primary care physician at the [facility] who did a thorough exam.  He states that she gave him a prescription for lisinopril 5 mg to be taken daily.  He has not started it yet because he wanted to talk to us first.        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       Past Surgical History:   Procedure Laterality Date   • CARDIAC CATHETERIZATION     • CARDIAC CATHETERIZATION N/A 5/17/2019    Procedure: Left Heart Cath;  Surgeon: Mark Masterson MD;  Location: [facility] CATH INVASIVE LOCATION;  Service: Cardiovascular   • CARDIAC CATHETERIZATION N/A 5/17/2019    Procedure: Coronary angiography;  Surgeon: Mark Masterson MD;  Location: [facility] CATH INVASIVE LOCATION;  Service: Cardiovascular   • CARDIAC CATHETERIZATION N/A 5/17/2019    Procedure: Left ventriculography;  Surgeon: Mark Masterson MD;  Location: [facility] CATH INVASIVE LOCATION;  Service: Cardiovascular   • CARDIAC CATHETERIZATION N/A 5/17/2019    Procedure: Stent DICK coronary;  Surgeon: Mark Masterson MD;  Location: [facility] CATH INVASIVE LOCATION;  Service: Cardiovascular   • CARDIAC ELECTROPHYSIOLOGY PROCEDURE N/A 4/24/2018    Procedure: Ablation atrial flutter;  Surgeon: Aaron Barrios MD;  Location: [facility] CATH INVASIVE LOCATION;  Service: Cardiovascular   • CORONARY ANGIOPLASTY WITH STENT PLACEMENT  2006   • KNEE ARTHROSCOPY W/ MENISCAL REPAIR Left    • THYROID SURGERY      cyst removal   • TONSILLECTOMY         Social History     Socioeconomic History   • Marital status:      Spouse name: Not on file   • Number of children: Not on file   • Years of education: Not on file   •  Highest education level: Not on file   Tobacco Use   • Smoking status: Former Smoker     Packs/day: 0.50     Years: 37.00     Pack years: 18.50     Types: Cigarettes   • Smokeless tobacco: Never Used   • Tobacco comment: caffeine use   Substance and Sexual Activity   • Alcohol use: Yes     Alcohol/week: 0.6 oz     Types: 1 Glasses of wine per week     Frequency: Never     Comment: social   • Drug use: Defer   • Sexual activity: Defer       Family History   Problem Relation Age of Onset   • No Known Problems Mother    • Esophageal cancer Father    • No Known Problems Sister    • No Known Problems Brother        Review of Systems   Constitution: Negative for chills, fever and malaise/fatigue.   Cardiovascular: Negative for chest pain, dyspnea on exertion, leg swelling, near-syncope, orthopnea, palpitations, paroxysmal nocturnal dyspnea and syncope.   Respiratory: Negative for cough and shortness of breath.    Musculoskeletal: Negative for joint pain, joint swelling and myalgias.   Gastrointestinal: Negative for abdominal pain, diarrhea, melena, nausea and vomiting.   Genitourinary: Negative for frequency and hematuria.   Neurological: Negative for light-headedness, numbness, paresthesias and seizures.   Psychiatric/Behavioral: The patient has insomnia.    Allergic/Immunologic: Negative.    All other systems reviewed and are negative.      Allergies   Allergen Reactions   • No Known Drug Allergy          Current Outpatient Medications:   •  ALPRAZolam (XANAX) 0.25 MG tablet, Take 1 tablet by mouth At Night As Needed for Anxiety., Disp: 10 tablet, Rfl: 0  •  ascorbic acid (VITAMIN C) 1000 MG tablet, Take 1,000 mg by mouth Daily., Disp: , Rfl:   •  atorvastatin (LIPITOR) 80 MG tablet, Take 80 mg by mouth Daily., Disp: , Rfl:   •  cholecalciferol (VITAMIN D3) 1000 units tablet, Take 1,000 Units by mouth Daily., Disp: , Rfl:   •  clopidogrel (PLAVIX) 75 MG tablet, Take 1 tablet by mouth Daily., Disp: 90 tablet, Rfl: 3  •   "Multiple Vitamins-Minerals (CENTRUM SILVER) tablet, Take 1 tablet by mouth Daily., Disp: , Rfl:   •  rivaroxaban (XARELTO) 20 MG tablet, Take 1 tablet by mouth Daily With Dinner., Disp: 30 tablet, Rfl: 11      Objective:     Vitals:    05/22/19 1225 05/22/19 1242   BP: 126/70 130/80   BP Location: Right arm Left arm   Pulse: (!) 48    SpO2: 98%    Weight: 84.8 kg (187 lb)    Height: 167.6 cm (66\")      Body mass index is 30.18 kg/m².    PHYSICAL EXAM:    Physical Exam   Constitutional: He is oriented to person, place, and time. He appears well-developed and well-nourished. No distress.   HENT:   Head: Normocephalic and atraumatic.   Eyes: Pupils are equal, round, and reactive to light.   Neck: No JVD present. No thyromegaly present.   Cardiovascular: Normal rate, regular rhythm, normal heart sounds and intact distal pulses.   No murmur heard.  Right radial cath site well healed without erythema or echymosis, palpable proximal and distal pulses, good capillary refill   Pulmonary/Chest: Effort normal and breath sounds normal. No respiratory distress.   Abdominal: Soft. Bowel sounds are normal. He exhibits no distension. There is no splenomegaly or hepatomegaly. There is no tenderness.   Musculoskeletal: Normal range of motion. He exhibits no edema.   Neurological: He is alert and oriented to person, place, and time.   Skin: Skin is warm and dry. He is not diaphoretic. No erythema.   Psychiatric: He has a normal mood and affect. His behavior is normal. Judgment normal.         ECG 12 Lead  Date/Time: 5/22/2019 12:56 PM  Performed by: Lima Alvarez PA  Authorized by: Lima Alvarez PA   Comparison: compared with previous ECG from 5/18/2019  Similar to previous ECG  Rhythm: sinus bradycardia  BPM: 39    Clinical impression: abnormal EKG  Comments: Indication: Coronary disease              Assessment:       Diagnosis Plan   1. Paroxysmal atrial fibrillation (CMS/HCC)  ECG 12 Lead   2. CAD in native artery  ECG 12 " Lead    Ambulatory Referral to Cardiac Rehab     Orders Placed This Encounter   Procedures   • Ambulatory Referral to Cardiac Rehab     Referral Priority:   Routine     Referral Type:   Rehabilitation - Outpatient     Number of Visits Requested:   1   • ECG 12 Lead     This order was created via procedure documentation          Plan:       Overall he is doing great.  His shortness of breath has improved significantly.  He states that the dofetilide and the carvedilol made him feel terrible, he feels much better since he stopped his medications.  He is not having any bleeding issues on the Plavix and Xarelto.  I am going to go ahead and get him signed up for cardiac rehab.  He is having some issues with sleep and I have asked him to try some over-the-counter medication like Tylenol PM.  He still in sinus bradycardia today.  I am going to further discuss this with Dr. Barrios, however right now he is completely asymptomatic and stable.  I would definitely try to stay away from any kind of AV leonora blocking agents in this patient.  As far as the lisinopril goes, at one point he did have an EF of around 37%, on cardiac catheterization his LV function appeared to be around 50%.  I did review his blood pressures at home and he does have some that are a little on the low side.  I think he can start lisinopril 2.5 mg daily if he chooses.  I am not going to make any changes to his medical regimen, and he will follow-up with Dr. Ashby in 1 month or sooner if needed and with Meme Spivey in July.    Atrial Fibrillation and Atrial Flutter  Assessment  • The patient has paroxysmal atrial fibrillation  • This is non-valvular in etiology  • The patient's CHADS2-VASc score is 3  • A [id number] score of 2 or more is considered a high risk for a thromboembolic event    Plan  • Attempt to maintain sinus rhythm  • Continue rivaroxaban for antithrombotic therapy, bleeding issues discussed    Coronary Artery Disease  Assessment  • The  patient has no angina  • There is a new diagnosis of stable angina in the past 12 months    Plan  • Lifestyle modifications discussed include adhering to a heart healthy diet, maintenance of a healthy weight, medication compliance, regular exercise and regular monitoring of cholesterol and blood pressure    Subjective - Objective  • There has been a previous stent procedure using DICK  • Current antiplatelet therapy includes clopidogrel 75 mg        As always, it has been a pleasure to participate in your patient's care.      Sincerely,         Lima Alvarez PA-C

## 2019-06-12 ENCOUNTER — TELEPHONE (OUTPATIENT)
Dept: CARDIOLOGY | Facility: CLINIC | Age: 69
End: 2019-06-12

## 2019-06-18 ENCOUNTER — OFFICE VISIT (OUTPATIENT)
Dept: CARDIOLOGY | Facility: CLINIC | Age: 69
End: 2019-06-18

## 2019-06-18 VITALS
SYSTOLIC BLOOD PRESSURE: 128 MMHG | HEART RATE: 45 BPM | DIASTOLIC BLOOD PRESSURE: 80 MMHG | HEIGHT: 66 IN | BODY MASS INDEX: 29.41 KG/M2 | WEIGHT: 183 LBS

## 2019-06-18 DIAGNOSIS — I48.0 PAROXYSMAL ATRIAL FIBRILLATION (HCC): ICD-10-CM

## 2019-06-18 DIAGNOSIS — Z95.5 STATUS POST INSERTION OF DRUG-ELUTING STENT INTO LEFT ANTERIOR DESCENDING ARTERY: Primary | ICD-10-CM

## 2019-06-18 DIAGNOSIS — I48.3 TYPICAL ATRIAL FLUTTER (HCC): ICD-10-CM

## 2019-06-18 PROCEDURE — 99214 OFFICE O/P EST MOD 30 MIN: CPT | Performed by: INTERNAL MEDICINE

## 2019-06-18 PROCEDURE — 93000 ELECTROCARDIOGRAM COMPLETE: CPT | Performed by: INTERNAL MEDICINE

## 2019-06-18 RX ORDER — LISINOPRIL 2.5 MG/1
2.5 TABLET ORAL DAILY
COMMUNITY
End: 2019-06-28

## 2019-06-18 RX ORDER — SENNOSIDES 8.6 MG
TABLET ORAL
COMMUNITY
End: 2019-12-19

## 2019-06-18 NOTE — PROGRESS NOTES
Date of Office Visit: 19  Encounter Provider: Kermit Ashby MD  Place of Service: [facility] CARDIOLOGY  Patient Name: Vicente Baker  :1950  [id number]    Chief Complaint   Patient presents with   • Coronary Artery Disease   • Atrial Fibrillation   :     HPI: Vicente Baker is a 68 y.o. male   He is here for a followup.  He has coronary disease and persistent atrial fibrillation that is asymptomatic.   In , we placed a 3.5 x 23 Cypher to his proximal circumflex, a 30 x 8 Cypher in his circumflex.    In , he was noted to have asymptomatic atrial fibrillation.  He developed typical atrial flutter and underwent a flutter ablation by Aaron Barrios in April.  He had a bit of a rough spring he has had some A. fib and was put on Tikosyn that made him feel badly with shortness of breath.  That led to a cath and he has a new lesion in his RCA and Dr. Masterson place the stent in his RCA.  He had a cardioversion the day after his stent and evidently went back into A. 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       Past Surgical History:   Procedure Laterality Date   • CARDIAC CATHETERIZATION     • CARDIAC CATHETERIZATION N/A 2019    Procedure: Left Heart Cath;  Surgeon: Mark Masterson MD;  Location: [facility] CATH INVASIVE LOCATION;  Service: Cardiovascular   • CARDIAC CATHETERIZATION N/A 2019    Procedure: Coronary angiography;  Surgeon: Mark Masterson MD;  Location: [facility] CATH INVASIVE LOCATION;  Service: Cardiovascular   • CARDIAC CATHETERIZATION N/A 2019    Procedure: Left ventriculography;  Surgeon: Mark Masterson MD;  Location:   [facility] CATH INVASIVE LOCATION;  Service: Cardiovascular   • CARDIAC CATHETERIZATION N/A 5/17/2019    Procedure: Stent DICK coronary;  Surgeon: Mark Masterson MD;  Location:  [facility] CATH INVASIVE LOCATION;  Service: Cardiovascular   • CARDIAC ELECTROPHYSIOLOGY PROCEDURE N/A 4/24/2018    Procedure: Ablation atrial flutter;  Surgeon: Aaron Barrios MD;  Location:  [facility] CATH INVASIVE LOCATION;  Service: Cardiovascular   • CORONARY ANGIOPLASTY WITH STENT PLACEMENT  2006   • KNEE ARTHROSCOPY W/ MENISCAL REPAIR Left    • THYROID SURGERY      cyst removal   • TONSILLECTOMY         Social History     Socioeconomic History   • Marital status:      Spouse name: Not on file   • Number of children: Not on file   • Years of education: Not on file   • Highest education level: Not on file   Tobacco Use   • Smoking status: Former Smoker     Packs/day: 0.50     Years: 37.00     Pack years: 18.50     Types: Cigarettes   • Smokeless tobacco: Never Used   • Tobacco comment: caffeine use   Substance and Sexual Activity   • Alcohol use: Yes     Alcohol/week: 0.6 oz     Types: 1 Glasses of wine per week     Frequency: Never     Comment: social   • Drug use: Defer   • Sexual activity: Defer       Family History   Problem Relation Age of Onset   • No Known Problems Mother    • Esophageal cancer Father    • No Known Problems Sister    • No Known Problems Brother        Review of Systems   Constitution: Negative for decreased appetite, fever, malaise/fatigue and weight loss.   HENT: Negative for nosebleeds.    Eyes: Negative for double vision.   Cardiovascular: Negative for chest pain, claudication, cyanosis, dyspnea on exertion, irregular heartbeat, leg swelling, near-syncope, orthopnea, palpitations, paroxysmal nocturnal dyspnea and syncope.   Respiratory: Negative for cough, hemoptysis and shortness of breath.    Hematologic/Lymphatic: Negative for bleeding problem.   Skin: Negative for rash.   Musculoskeletal: Negative for falls and  "myalgias.   Gastrointestinal: Negative for hematochezia, jaundice, melena, nausea and vomiting.   Genitourinary: Negative for hematuria.   Neurological: Negative for dizziness and seizures.   Psychiatric/Behavioral: Negative for altered mental status and memory loss.       Allergies   Allergen Reactions   • No Known Drug Allergy          Current Outpatient Medications:   •  ALPRAZolam (XANAX) 0.25 MG tablet, Take 1 tablet by mouth At Night As Needed for Anxiety., Disp: 10 tablet, Rfl: 0  •  ascorbic acid (VITAMIN C) 1000 MG tablet, Take 1,000 mg by mouth Daily., Disp: , Rfl:   •  atorvastatin (LIPITOR) 80 MG tablet, Take 80 mg by mouth Daily., Disp: , Rfl:   •  cholecalciferol (VITAMIN D3) 1000 units tablet, Take 1,000 Units by mouth Daily., Disp: , Rfl:   •  CINNAMON PO, Take  by mouth., Disp: , Rfl:   •  clopidogrel (PLAVIX) 75 MG tablet, Take 1 tablet by mouth Daily., Disp: 90 tablet, Rfl: 3  •  Coenzyme Q10 (COQ-10) 400 MG capsule, Take  by mouth., Disp: , Rfl:   •  lisinopril (PRINIVIL,ZESTRIL) 2.5 MG tablet, Take 2.5 mg by mouth Daily., Disp: , Rfl:   •  Multiple Vitamins-Minerals (CENTRUM SILVER) tablet, Take 1 tablet by mouth Daily., Disp: , Rfl:   •  rivaroxaban (XARELTO) 20 MG tablet, Take 1 tablet by mouth Daily With Dinner., Disp: 30 tablet, Rfl: 11     Objective:     Vitals:    06/18/19 1349   Weight: 83 kg (183 lb)   Height: 167.6 cm (66\")     Body mass index is 29.54 kg/m².    Physical Exam   Constitutional: He is oriented to person, place, and time. He appears well-developed and well-nourished.   HENT:   Head: Normocephalic.   Eyes: No scleral icterus.   Neck: No JVD present. No thyromegaly present.   Cardiovascular: Normal rate and normal heart sounds. An irregularly irregular rhythm present. Exam reveals no gallop and no friction rub.   No murmur heard.  Pulmonary/Chest: Effort normal and breath sounds normal. He has no wheezes. He has no rales.   Abdominal: Soft. There is no hepatosplenomegaly. " There is no tenderness.   Musculoskeletal: Normal range of motion. He exhibits no edema.   Lymphadenopathy:     He has no cervical adenopathy.   Neurological: He is alert and oriented to person, place, and time.   Skin: Skin is warm and dry. No rash noted.   Psychiatric: He has a normal mood and affect.         ECG 12 Lead  Date/Time: 6/18/2019 2:25 PM  Performed by: Kermit Ashby MD  Authorized by: Kermit Ashby MD   Comparison: compared with previous ECG   Similar to previous ECG  Rhythm: sinus bradycardia    Clinical impression: normal ECG             Assessment:       Diagnosis Plan   1. Status post insertion of drug-eluting stent into left anterior descending artery     2. Typical atrial flutter (CMS/HCC)     3. Paroxysmal atrial fibrillation (CMS/HCC)            Plan:       From a coronary standpoint he is doing well is on good medical therapy he is on an ACE he is on statin is on clopidogrel I think were not can make any change I think is the story and his A. fib though is not over.  He has persistent resting sinus bradycardia and he keeps going in and out of a A. fib.  This is a complex case and I think is appropriately managed by the arrhythmia service I will have him see Rach in 6 months and see me in a year  Coronary Artery Disease  Assessment  • The patient has no angina    Plan  • Lifestyle modifications discussed include adhering to a heart healthy diet, maintenance of a healthy weight, medication compliance, regular exercise and regular monitoring of cholesterol and blood pressure    Subjective - Objective  • There has been a previous stent procedure using DICK  • Current antiplatelet therapy includes aspirin 81 mg      Atrial Fibrillation and Atrial Flutter  Assessment  • The patient has persistent atrial fibrillation  • This is non-valvular in etiology  • The patient's CHADS2-VASc score is 1  • A [id number]-CNNl score of 1 is considered an intermediate risk for a thromboembolic event  • Aspirin  prescribed    Plan  • Continue in atrial fibrillation with rate control  • Continue aspirin for antithrombotic therapy, bleeding issues discussed    As always, it has been a pleasure to participate in your patient's care.      Sincerely,       Kermit Ashby MD

## 2019-06-24 ENCOUNTER — TREATMENT (OUTPATIENT)
Dept: CARDIAC REHAB | Facility: HOSPITAL | Age: 69
End: 2019-06-24

## 2019-06-24 DIAGNOSIS — Z95.5 STATUS POST INSERTION OF DRUG ELUTING CORONARY ARTERY STENT: Primary | ICD-10-CM

## 2019-06-24 PROCEDURE — 93798 PHYS/QHP OP CAR RHAB W/ECG: CPT

## 2019-06-25 ENCOUNTER — TELEPHONE (OUTPATIENT)
Dept: CARDIOLOGY | Facility: CLINIC | Age: 69
End: 2019-06-25

## 2019-06-25 NOTE — TELEPHONE ENCOUNTER
"Patient notified and verbalized understanding.  He wants to know what else can be done for the \"in and out\" of afib.  Discussed with Anila who stated they can discuss it further at upcoming apt.  "

## 2019-06-25 NOTE — TELEPHONE ENCOUNTER
"06/25/19  8:29 AM  Vicente Baker  1950  Home Phone [phone]   Mobile [phone]   Home Phone [phone]       Vicente Baker is a patient of Dr Ashby and Dr Barrios.  He is calling in this morning \"in afib\"  Patient woke up in a sweat and was lightheaded, but has now resolved.    0742   98/66 hr 74  0811 116/83 hr 86  0814 102/66 hr 69    Cardiac meds reviewed  Clopidogrel 75mg daily  xarelto 20mg daily  Lisinopril 2.5mg daily    I reassured him that his heart rate and bp look ok and are controlled.      Hx of cardioversion.    How would you like to proceed?  Thanks  Jess Nelson RN  Triage nurse    "

## 2019-06-25 NOTE — TELEPHONE ENCOUNTER
Called and updated patient.    He adds that he took his bp a little bit ago and got 89/59 hr 52 he said he is lightheaded when he gets up.    Do we want to adjust any meds for the hypotension?  He isn't drinking enough water, I encouraged him to make sure he gets 6-8 glasses of water a day and see if the hypotension improves.    Thanks  Jess Nelson RN  Triage nurse

## 2019-06-25 NOTE — TELEPHONE ENCOUNTER
Patient notified that he can discuss a plan going forward at his upcoming apt on 7/18. He verbalized understanding.  Jess Nelson RN  Triage nurse

## 2019-06-26 NOTE — TELEPHONE ENCOUNTER
Cardiac rehab. Wants to know when Mr. Baker is in A-fib is it ok to do cardiac rehab  He was in A-fib today and they went ahead and just sent him home.  Please advise      Ivone [phone]

## 2019-06-26 NOTE — TELEPHONE ENCOUNTER
Oh yes, Mica -- he can do rehab or anything else in AF.   He should not be sent home from rehab for AF

## 2019-06-27 NOTE — TELEPHONE ENCOUNTER
Called left message for cardiac rehab.  Patient can do rehab or anything else in AF. He shouldn't be sent home from rehab for AF  Per Dr. Theodore

## 2019-06-28 ENCOUNTER — OFFICE VISIT (OUTPATIENT)
Dept: CARDIOLOGY | Facility: CLINIC | Age: 69
End: 2019-06-28

## 2019-06-28 ENCOUNTER — TREATMENT (OUTPATIENT)
Dept: CARDIAC REHAB | Facility: HOSPITAL | Age: 69
End: 2019-06-28

## 2019-06-28 VITALS
BODY MASS INDEX: 29.89 KG/M2 | HEIGHT: 66 IN | OXYGEN SATURATION: 98 % | WEIGHT: 186 LBS | HEART RATE: 60 BPM | DIASTOLIC BLOOD PRESSURE: 100 MMHG | SYSTOLIC BLOOD PRESSURE: 140 MMHG

## 2019-06-28 DIAGNOSIS — I48.0 PAROXYSMAL ATRIAL FIBRILLATION (HCC): Primary | ICD-10-CM

## 2019-06-28 DIAGNOSIS — Z95.5 STATUS POST INSERTION OF DRUG ELUTING CORONARY ARTERY STENT: Primary | ICD-10-CM

## 2019-06-28 PROCEDURE — 99213 OFFICE O/P EST LOW 20 MIN: CPT | Performed by: PHYSICIAN ASSISTANT

## 2019-06-28 PROCEDURE — 93000 ELECTROCARDIOGRAM COMPLETE: CPT | Performed by: PHYSICIAN ASSISTANT

## 2019-06-28 PROCEDURE — 93798 PHYS/QHP OP CAR RHAB W/ECG: CPT

## 2019-06-28 RX ORDER — ATENOLOL 50 MG/1
50 TABLET ORAL DAILY
Qty: 30 TABLET | Refills: 11 | Status: SHIPPED | OUTPATIENT
Start: 2019-06-28 | End: 2019-12-19

## 2019-06-28 NOTE — PROGRESS NOTES
Date of Office Visit: 2019  Encounter Provider: BENJAMIN Saha  Place of Service: [facility] CARDIOLOGY  Patient Name: Vicente Baker  :1950    Chief Complaint   Patient presents with   • Coronary Artery Disease     1 week follow up   :     HPI: Vicente Baker is a 68 y.o. male who presents today for follow-up.  Old records have been obtained and reviewed by me.  He is a patient of Dr. Barrios's and Dr. Elmore.  He has coronary disease with a stent placed to his proximal circumflex in .  He had an atrial flutter ablation and 2018.  He has had episodes of atrial fibrillation with RVR.  He is been anticoagulated.  He was cardioverted in 2019 successfully.  This was on 2019.  He then came back to the ER on 2019 and was felt to be dehydrated with some mild alcoholic ketosis.  He then saw his PCP on 2019 and was in atrial fibrillation in the setting of an acute respiratory viral illness.  He was rate controlled.  Ultimately per Dr. Barrios's recommendations he was admitted on 2019 for initiation of dofetilide.  He was then cardioverted successfully on 2019 and discharged home on 500 mcg of dofetilide every 12 hours.  He was a little bradycardic after his cardioversion and his carvedilol was decreased.  He presented to the emergency room on 2019 with complaints of shortness of breath.  He ultimately underwent cardiac catheterization on 2019.  This showed a 30% distal left main, 30% proximal LAD, 50% focal mid LAD, 40% proximal diagonal, widely patent circumflex stent, and 80% proximal RCA.  He underwent successful drug-eluting stent placement to the proximal RCA lesion.  Recommendations were for dual therapy with rivaroxaban and clopidogrel and to discontinue the aspirin.    He saw Dr. Ashby on 2019.  At that visit he had been cardioverted a second time at a [facility] facility.  When he saw Dr. Ashby a few  weeks ago he was sinus bradycardia.  Recommendations were for him to come and see me in 6 months.  I saw him today on the elevator riding up to the office, he told me he was sent by cardiac rehab because he was back in atrial fibrillation and that his heart rate was going up and to the 170s during exertion.  He did bring his telemetry strips with him from rehab and in fact, he does have RVR with minimal exertion.   He states that he thinks he went into atrial fibrillation on 6/25/2019.  This was when he woke up and was in a cold sweat.  He took his blood pressure and his heart rate, his blood pressure was in the 80s systolic his heart rate was in the 50s.  We asked him to discontinue his lisinopril.  He did, and he felt better.  He really does not notice now that he is in atrial fibrillation.  He has completely avoided alcohol.  He has a little lightheadedness.  He denies any chest pain, shortness of breath, edema, dizziness, or syncope.  He does state that he went to the pool [place] and was a little off his game last night, he just was not feeling well.    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       Past Surgical History:   Procedure Laterality Date   • CARDIAC CATHETERIZATION     • CARDIAC CATHETERIZATION N/A 5/17/2019    Procedure: Left Heart Cath;  Surgeon: Mark Masterson MD;  Location: [facility] INVASIVE LOCATION;  Service: Cardiovascular   • CARDIAC CATHETERIZATION N/A 5/17/2019    Procedure: Coronary angiography;  Surgeon: Mark Masterson MD;  Location: [facility] INVASIVE LOCATION;  Service: Cardiovascular   • CARDIAC CATHETERIZATION N/A 5/17/2019    Procedure: Left ventriculography;  Surgeon: Mark Masterson MD;  Location: [facility] INVASIVE LOCATION;  Service: Cardiovascular    • CARDIAC CATHETERIZATION N/A 5/17/2019    Procedure: Stent DICK coronary;  Surgeon: Mark Masterson MD;  Location:  [facility] CATH INVASIVE LOCATION;  Service: Cardiovascular   • CARDIAC ELECTROPHYSIOLOGY PROCEDURE N/A 4/24/2018    Procedure: Ablation atrial flutter;  Surgeon: Aaron Barrios MD;  Location: [facility] CATH INVASIVE LOCATION;  Service: Cardiovascular   • CORONARY ANGIOPLASTY WITH STENT PLACEMENT  2006   • KNEE ARTHROSCOPY W/ MENISCAL REPAIR Left    • THYROID SURGERY      cyst removal   • TONSILLECTOMY         Social History     Socioeconomic History   • Marital status:      Spouse name: Not on file   • Number of children: Not on file   • Years of education: Not on file   • Highest education level: Not on file   Tobacco Use   • Smoking status: Former Smoker     Packs/day: 0.50     Years: 37.00     Pack years: 18.50     Types: Cigarettes   • Smokeless tobacco: Never Used   • Tobacco comment: caffeine use   Substance and Sexual Activity   • Alcohol use: Yes     Alcohol/week: 0.6 oz     Types: 1 Glasses of wine per week     Frequency: Never     Comment: social   • Drug use: Defer   • Sexual activity: Defer       Family History   Problem Relation Age of Onset   • No Known Problems Mother    • Esophageal cancer Father    • No Known Problems Sister    • No Known Problems Brother        Review of Systems   Constitution: Negative for chills, fever and malaise/fatigue.   Cardiovascular: Positive for palpitations. Negative for chest pain, dyspnea on exertion, leg swelling, near-syncope, orthopnea, paroxysmal nocturnal dyspnea and syncope.   Respiratory: Negative for cough and shortness of breath.    Musculoskeletal: Negative for joint pain, joint swelling and myalgias.   Gastrointestinal: Negative for abdominal pain, diarrhea, melena, nausea and vomiting.   Genitourinary: Negative for frequency and hematuria.   Neurological: Positive for light-headedness. Negative for numbness, paresthesias and seizures.  "  Allergic/Immunologic: Negative.    All other systems reviewed and are negative.      Allergies   Allergen Reactions   • No Known Drug Allergy          Current Outpatient Medications:   •  ALPRAZolam (XANAX) 0.25 MG tablet, Take 1 tablet by mouth At Night As Needed for Anxiety., Disp: 10 tablet, Rfl: 0  •  ascorbic acid (VITAMIN C) 1000 MG tablet, Take 1,000 mg by mouth Daily., Disp: , Rfl:   •  atorvastatin (LIPITOR) 80 MG tablet, Take 80 mg by mouth Daily., Disp: , Rfl:   •  cholecalciferol (VITAMIN D3) 1000 units tablet, Take 1,000 Units by mouth Daily., Disp: , Rfl:   •  CINNAMON PO, Take 1 tablet by mouth Daily., Disp: , Rfl:   •  clopidogrel (PLAVIX) 75 MG tablet, Take 1 tablet by mouth Daily., Disp: 90 tablet, Rfl: 3  •  Coenzyme Q10 (COQ-10) 400 MG capsule, Take  by mouth., Disp: , Rfl:   •  Multiple Vitamins-Minerals (CENTRUM SILVER) tablet, Take 1 tablet by mouth Daily., Disp: , Rfl:   •  rivaroxaban (XARELTO) 20 MG tablet, Take 1 tablet by mouth Daily With Dinner., Disp: 30 tablet, Rfl: 11      Objective:     Vitals:    06/28/19 1012 06/28/19 1019   BP: 130/94 140/100   BP Location: Right arm Left arm   Pulse: 60    SpO2: 98%    Weight: 84.4 kg (186 lb)    Height: 167.6 cm (66\")      Body mass index is 30.02 kg/m².    PHYSICAL EXAM:    Physical Exam   Constitutional: He is oriented to person, place, and time. He appears well-developed and well-nourished. No distress.   HENT:   Head: Normocephalic and atraumatic.   Eyes: Pupils are equal, round, and reactive to light.   Neck: No JVD present. No thyromegaly present.   Cardiovascular: Normal heart sounds and intact distal pulses. An irregular rhythm present. Tachycardia present.   No murmur heard.  Pulmonary/Chest: Effort normal and breath sounds normal. No respiratory distress.   Abdominal: Soft. Bowel sounds are normal. He exhibits no distension. There is no splenomegaly or hepatomegaly. There is no tenderness.   Musculoskeletal: Normal range of motion. " He exhibits no edema.   Neurological: He is alert and oriented to person, place, and time.   Skin: Skin is warm and dry. He is not diaphoretic. No erythema.   Psychiatric: He has a normal mood and affect. His behavior is normal. Judgment normal.         ECG 12 Lead  Date/Time: 6/28/2019 10:29 AM  Performed by: Lima Alvarez PA  Authorized by: Lima Alvarez PA   Comparison: compared with previous ECG from 6/18/2019  Rhythm: atrial fibrillation  BPM: 104    Clinical impression: abnormal EKG  Comments: Indication: Atrial fibrillation              Assessment:       Diagnosis Plan   1. Paroxysmal atrial fibrillation (CMS/[facility])  ECG 12 Lead     Orders Placed This Encounter   Procedures   • ECG 12 Lead     This order was created via procedure documentation          Plan:       I did discuss the plan of care with Dr. Barrios, who also evaluated the patient today.  We are going to start him on atenolol 50 mg every morning.  He has had a history of sinus bradycardia in the past while on beta-blockers, however Dr. Barrios feels that he is going to remain in atrial fibrillation and we need to add a beta-blocker for rate control.  He currently has an appointment to see Meme Spivey on 7/18/2018, he will keep this appointment.  Currently his blood pressure has plenty of room to add atenolol 50 mg to his medical regimen.    Atrial Fibrillation and Atrial Flutter  Assessment  • The patient has persistent atrial fibrillation  • This is non-valvular in etiology  • The patient's CHADS2-VASc score is 3  • A [id number] score of 2 or more is considered a high risk for a thromboembolic event  • Rivaroxaban prescribed    Plan  • Continue in atrial fibrillation with rate control  • Continue rivaroxaban for antithrombotic therapy, bleeding issues discussed  • Add beta blocker for rate control    Subjective - Objective  • The patient underwent cardioversion           As always, it has been a pleasure to participate in your patient's  care.      Sincerely,         Lima Alvarez PA-C

## 2019-07-01 ENCOUNTER — TREATMENT (OUTPATIENT)
Dept: CARDIAC REHAB | Facility: HOSPITAL | Age: 69
End: 2019-07-01

## 2019-07-01 DIAGNOSIS — Z95.5 STATUS POST INSERTION OF DRUG ELUTING CORONARY ARTERY STENT: Primary | ICD-10-CM

## 2019-07-01 PROCEDURE — 93798 PHYS/QHP OP CAR RHAB W/ECG: CPT

## 2019-07-03 ENCOUNTER — TREATMENT (OUTPATIENT)
Dept: CARDIAC REHAB | Facility: HOSPITAL | Age: 69
End: 2019-07-03

## 2019-07-03 DIAGNOSIS — Z95.5 STATUS POST INSERTION OF DRUG ELUTING CORONARY ARTERY STENT: Primary | ICD-10-CM

## 2019-07-03 PROCEDURE — 93798 PHYS/QHP OP CAR RHAB W/ECG: CPT

## 2019-07-05 ENCOUNTER — TREATMENT (OUTPATIENT)
Dept: CARDIAC REHAB | Facility: HOSPITAL | Age: 69
End: 2019-07-05

## 2019-07-05 DIAGNOSIS — Z95.5 STATUS POST INSERTION OF DRUG ELUTING CORONARY ARTERY STENT: Primary | ICD-10-CM

## 2019-07-05 PROCEDURE — 93798 PHYS/QHP OP CAR RHAB W/ECG: CPT

## 2019-07-08 ENCOUNTER — TREATMENT (OUTPATIENT)
Dept: CARDIAC REHAB | Facility: HOSPITAL | Age: 69
End: 2019-07-08

## 2019-07-08 DIAGNOSIS — Z95.5 STATUS POST INSERTION OF DRUG ELUTING CORONARY ARTERY STENT: Primary | ICD-10-CM

## 2019-07-08 PROCEDURE — 93798 PHYS/QHP OP CAR RHAB W/ECG: CPT

## 2019-07-10 ENCOUNTER — TREATMENT (OUTPATIENT)
Dept: CARDIAC REHAB | Facility: HOSPITAL | Age: 69
End: 2019-07-10

## 2019-07-10 DIAGNOSIS — Z95.5 STATUS POST INSERTION OF DRUG ELUTING CORONARY ARTERY STENT: Primary | ICD-10-CM

## 2019-07-10 PROCEDURE — 93798 PHYS/QHP OP CAR RHAB W/ECG: CPT

## 2019-07-15 ENCOUNTER — TREATMENT (OUTPATIENT)
Dept: CARDIAC REHAB | Facility: HOSPITAL | Age: 69
End: 2019-07-15

## 2019-07-15 DIAGNOSIS — Z95.5 STATUS POST INSERTION OF DRUG ELUTING CORONARY ARTERY STENT: Primary | ICD-10-CM

## 2019-07-15 PROCEDURE — 93798 PHYS/QHP OP CAR RHAB W/ECG: CPT

## 2019-07-17 ENCOUNTER — TREATMENT (OUTPATIENT)
Dept: CARDIAC REHAB | Facility: HOSPITAL | Age: 69
End: 2019-07-17

## 2019-07-17 DIAGNOSIS — Z95.5 STATUS POST INSERTION OF DRUG ELUTING CORONARY ARTERY STENT: Primary | ICD-10-CM

## 2019-07-17 PROCEDURE — 93798 PHYS/QHP OP CAR RHAB W/ECG: CPT

## 2019-07-18 ENCOUNTER — OFFICE VISIT (OUTPATIENT)
Dept: CARDIOLOGY | Facility: CLINIC | Age: 69
End: 2019-07-18

## 2019-07-18 VITALS
HEIGHT: 66 IN | HEART RATE: 82 BPM | SYSTOLIC BLOOD PRESSURE: 120 MMHG | DIASTOLIC BLOOD PRESSURE: 88 MMHG | WEIGHT: 187 LBS | BODY MASS INDEX: 30.05 KG/M2

## 2019-07-18 DIAGNOSIS — I48.19 PERSISTENT ATRIAL FIBRILLATION (HCC): Primary | ICD-10-CM

## 2019-07-18 DIAGNOSIS — Z98.890 HISTORY OF CARDIAC RADIOFREQUENCY ABLATION: ICD-10-CM

## 2019-07-18 DIAGNOSIS — Z95.5 STATUS POST INSERTION OF DRUG-ELUTING STENT INTO LEFT ANTERIOR DESCENDING ARTERY: ICD-10-CM

## 2019-07-18 DIAGNOSIS — I48.3 TYPICAL ATRIAL FLUTTER (HCC): ICD-10-CM

## 2019-07-18 DIAGNOSIS — I25.10 CAD IN NATIVE ARTERY: ICD-10-CM

## 2019-07-18 PROCEDURE — 93000 ELECTROCARDIOGRAM COMPLETE: CPT | Performed by: NURSE PRACTITIONER

## 2019-07-18 PROCEDURE — 99213 OFFICE O/P EST LOW 20 MIN: CPT | Performed by: NURSE PRACTITIONER

## 2019-07-18 NOTE — PROGRESS NOTES
Date of Office Visit: 2019  Encounter Provider: DERIK Devine  Place of Service: [facility] CARDIOLOGY  Patient Name: Vicente Baker  :1950    Chief Complaint   Patient presents with   • Persistent atrial fibrillation   • Coronary Artery Disease   :     HPI: Vicente Baker is a 68 y.o. male who is a patient of Dr. Ashby's and Dr. Mendez's with history of PAF, atrial flutter, status post CTI ablation in 2018, MI/CAD, status post stent to proximal circumflex in  who then developed recurrent issues with persistent A. fib with RVR and January of this year.  He had a stress test at that time showed an EF of 37% and a medium size infarct located in the lateral wall and inferior wall with mild ish-infarct ischemia--at that time Dr. Ashby thought that his primary problem was probably related to his A. fib with RVR.    He was cardioverted in February (this was not done sooner due to he had missed a couple of doses of his anticoagulant), he presented to the ED the day after his cardioversion with complaints of weakness and confusion that was actually attributed to dehydration and some mild alcoholic ketosis at that time.  He was treated with IV fluids, his remaining work-up was negative and he was discharged home.    On  he was seen by his PCP and was noted to be tachycardic and dyspneic he was seen again in the ED but was felt to have an acute respiratory viral illness at that time so we try to be more aggressive with rate control.  I saw him in the office in early March and he had remained in A. fib so we had planned to admit him for dofetilide and try cardioversion.  He was admitted in early May for initiation of dofetilide and cardioversion.  Cardioversion was successful and he was discharged home on 500 mcg twice daily.  A few days post discharge she called the office with complaints of shortness of breath whenever he would lay down he came to the office  for an EKG, was bradycardic and we did decrease his carvedilol however he continued to have shortness of breath and presented again to the ER.  With his recurrent dyspnea we were concerned that this might be his anginal equivalent so he actually underwent cardiac cath on 5/17/2019 which showed a 30% distal left main, 30% proximal LAD, 50% focal mid LAD, 40% proximal diagonal, widely patent circumflex stent and an 80% proximal RCA.  He underwent successful drug-eluting stent placement to the proximal RCA.  His dofetilide was discontinued and his beta-blocker was also discontinued at that time because during that admission he had remained bradycardic but in sinus rhythm.    Was then into a [facility] facility on June 11th with complaints of chest pain and was noted to be back in A. fib and they actually cardioverted him in the ED and discharged him home.  He followed up with Dr. Ashby on 618/19 and had maintained sinus rhythm at that time.    He then was participating in cardiac rehab and on 6/28/2019 they noted that he was in A. fib and his heart rate was quite elevated with minimal exertion so they sent him to the office and he was seen by Lima Alvarez PA-C.  Dr. Barrios also saw him that day with her and they decided to put him back on beta-blocker and he was started on atenolol 50 mg daily.  He presents today for follow-up.    He reports that he is doing okay as far as not having any chest pain or significant dyspnea.  His primary complaint is that he gets lightheaded sometimes with position changes, this is brief he is not near syncopal but does notice this.  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  He does think that his stamina/energy is still decreased and he wants to know if he is going to stay in A. fib all the  time.  He does not have any PND, orthopnea or edema.  He does not really feel his A. fib unless his heart rate gets rapid.    He is participating in cardiac rehab 3 times a week and says that he is up to 25 minutes on the treadmill and bike and alternates these and he has started doing light weights.  He tolerates the exercise without difficulty.    He did have a repeat home sleep study he says that they told him that it was inconclusive but looking at it they really did not think that he had sleep apnea.  He does complain that he still has some sleep issues due to sinus/allergy problems and he is on medication for this.    He has not lost any weight but he has not gained any weight and says that he is still working on his diet.  He has not had any alcohol since his discharge from his last hospitalization.    He is on rivaroxaban and clopidogrel currently with no bleeding issues.          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   • Typical atrial flutter (CMS/HCC)        Past Surgical History:   Procedure Laterality Date   • CARDIAC CATHETERIZATION     • CARDIAC CATHETERIZATION N/A 5/17/2019    Procedure: Left Heart Cath;  Surgeon: Mark Masterson MD;  Location: [facility] CATH INVASIVE LOCATION;  Service: Cardiovascular   • CARDIAC CATHETERIZATION N/A 5/17/2019    Procedure: Coronary angiography;  Surgeon: Mark Masterson MD;  Location: [facility] CATH INVASIVE LOCATION;  Service: Cardiovascular   • CARDIAC CATHETERIZATION N/A  5/17/2019    Procedure: Left ventriculography;  Surgeon: Mark Masterson MD;  Location: [facility] CATH INVASIVE LOCATION;  Service: Cardiovascular   • CARDIAC CATHETERIZATION N/A 5/17/2019    Procedure: Stent DICK coronary;  Surgeon: Mark Masterson MD;  Location:  [facility] CATH INVASIVE LOCATION;  Service: Cardiovascular   • CARDIAC ELECTROPHYSIOLOGY PROCEDURE N/A 4/24/2018    Procedure: Ablation atrial flutter;  Surgeon: Aaron Barrios MD;  Location: [facility] CATH INVASIVE LOCATION;  Service: Cardiovascular   • CORONARY ANGIOPLASTY WITH STENT PLACEMENT  2006   • KNEE ARTHROSCOPY W/ MENISCAL REPAIR Left    • THYROID SURGERY      cyst removal   • TONSILLECTOMY         Social History     Socioeconomic History   • Marital status:      Spouse name: Not on file   • Number of children: Not on file   • Years of education: Not on file   • Highest education level: Not on file   Tobacco Use   • Smoking status: Former Smoker     Packs/day: 0.50     Years: 37.00     Pack years: 18.50     Types: Cigarettes   • Smokeless tobacco: Never Used   • Tobacco comment: caffeine use   Substance and Sexual Activity   • Alcohol use: Yes     Alcohol/week: 0.6 oz     Types: 1 Glasses of wine per week     Frequency: Never     Comment: social   • Drug use: Defer   • Sexual activity: Defer       Family History   Problem Relation Age of Onset   • No Known Problems Mother    • Esophageal cancer Father    • No Known Problems Sister    • No Known Problems Brother        Review of Systems   Constitution: Positive for malaise/fatigue. Negative for chills and fever.   Cardiovascular: Negative for chest pain, dyspnea on exertion, leg swelling, near-syncope, orthopnea, palpitations, paroxysmal nocturnal dyspnea and syncope.   Respiratory: Negative for cough and shortness of breath.    Hematologic/Lymphatic: Negative.    Musculoskeletal: Negative for joint pain, joint swelling and myalgias.   Gastrointestinal: Negative for abdominal pain, diarrhea, melena, nausea  "and vomiting.   Genitourinary: Negative for frequency and hematuria.   Neurological: Positive for light-headedness (when working over his head and sometimes with position changes). Negative for numbness, paresthesias and seizures.   Allergic/Immunologic: Negative.    All other systems reviewed and are negative.      Allergies   Allergen Reactions   • No Known Drug Allergy          Current Outpatient Medications:   •  ALPRAZolam (XANAX) 0.25 MG tablet, Take 1 tablet by mouth At Night As Needed for Anxiety., Disp: 10 tablet, Rfl: 0  •  ascorbic acid (VITAMIN C) 1000 MG tablet, Take 1,000 mg by mouth Daily., Disp: , Rfl:   •  atenolol (TENORMIN) 50 MG tablet, Take 1 tablet by mouth Daily., Disp: 30 tablet, Rfl: 11  •  atorvastatin (LIPITOR) 80 MG tablet, Take 80 mg by mouth Daily., Disp: , Rfl:   •  cholecalciferol (VITAMIN D3) 1000 units tablet, Take 1,000 Units by mouth Daily., Disp: , Rfl:   •  CINNAMON PO, Take 1 tablet by mouth Daily., Disp: , Rfl:   •  clopidogrel (PLAVIX) 75 MG tablet, Take 1 tablet by mouth Daily., Disp: 90 tablet, Rfl: 3  •  Coenzyme Q10 (COQ-10) 400 MG capsule, Take  by mouth., Disp: , Rfl:   •  Multiple Vitamins-Minerals (CENTRUM SILVER) tablet, Take 1 tablet by mouth Daily., Disp: , Rfl:   •  rivaroxaban (XARELTO) 20 MG tablet, Take 1 tablet by mouth Daily With Dinner., Disp: 30 tablet, Rfl: 11      Objective:     Vitals:    07/18/19 0952   BP: 120/88   Pulse: 82   Weight: 84.8 kg (187 lb)   Height: 167.6 cm (65.98\")     Body mass index is 30.2 kg/m².    PHYSICAL EXAM:    Vitals Reviewed.   General Appearance: No acute distress, well developed and well nourished.   Eyes: Conjunctiva and lids: No erythema, swelling, or discharge. Sclera non-icteric.   HENT: Atraumatic, normocephalic. External eyes, ears, and nose normal.   Respiratory: No signs of respiratory distress. Respiration rhythm and depth normal.   Clear to auscultation. No rales, crackles, rhonchi, or wheezing auscultated. "   Cardiovascular:  Jugular Venous Pressure: Normal  Heart Rate and Rhythm: Irregularly, irregular.  Heart Sounds: Normal S1 and S2. No S3 or S4 noted.  Murmurs: No murmurs noted. No rubs, thrills, or gallops.   Arterial Pulses:  Posterior tibialis and dorsalis pedis pulses normal.   Lower Extremities: No edema noted.  Gastrointestinal:  Abdomen soft, non-distended, non-tender.   Musculoskeletal: Normal movement of extremities  Skin and Nails: General appearance normal. No pallor, cyanosis, diaphoresis. Skin temperature normal. No clubbing of fingernails.   Psychiatric: Patient alert and oriented to person, place, and time. Speech and behavior appropriate. Normal mood and affect.       ECG 12 Lead  Date/Time: 7/18/2019 3:36 PM  Performed by: Anila Spivey APRN  Authorized by: Anila Spivey APRN   Comparison: compared with previous ECG   Similar to previous ECG  Rhythm: atrial fibrillation  BPM: 82                Assessment:       Diagnosis Plan   1. Persistent atrial fibrillation (CMS/HCC)     2. Typical atrial flutter (CMS/HCC)     3. History of cardiac radiofrequency ablation     4. CAD in native artery     5. Status post insertion of drug-eluting stent into left anterior descending artery            Plan:       1.-3. Atrial Fibrillation and Atrial Flutter  Assessment  • The patient has persistent atrial fibrillation  • The patient's CHADS2-VASc score is 3  • A [id number] score of 2 or more is considered a high risk for a thromboembolic event  • Rivaroxaban prescribed  • Atrial fib, heart rate controlled on today's EKG, 82 bpm.    Actually think overall he is doing pretty good.  He is currently participating in cardiac rehab and tolerating 25 minutes on the treadmill or the bike as well as doing light weights.    He was concerned that some of this intermittent lightheadedness was due to the atenolol, some of it is orthostatic but it is very mild and I told him I really think we need to continue on this  current dose as it is controlling his heart rate well.    I did discuss with Dr. Barrios work on letting go ahead and finish out cardiac rehab which should be in the next 6 to 8 weeks and see how he does.  I have told him to go ahead and try to get back to his normal life and see how he does.  It may be that pursuing rate control may be all that is needed.    He will follow-up with Dr. Barrios in about 6 to 8 weeks.    Subjective - Objective  • The patient underwent cardioversion       4.-5. Coronary Artery Disease  Assessment  • The patient has no angina    Plan  • Lifestyle modifications discussed include adhering to a heart healthy diet, maintenance of a healthy weight, medication compliance and regular exercise    Subjective - Objective  • There has been a previous stent procedure using DICK  • Current antiplatelet therapy includes clopidogrel 75 mg        As always, it has been a pleasure to participate in your patient's care.      Sincerely,         DERIK Serrano

## 2019-07-19 ENCOUNTER — TREATMENT (OUTPATIENT)
Dept: CARDIAC REHAB | Facility: HOSPITAL | Age: 69
End: 2019-07-19

## 2019-07-19 DIAGNOSIS — Z95.5 STATUS POST INSERTION OF DRUG ELUTING CORONARY ARTERY STENT: Primary | ICD-10-CM

## 2019-07-19 PROCEDURE — 93798 PHYS/QHP OP CAR RHAB W/ECG: CPT

## 2019-07-22 ENCOUNTER — TREATMENT (OUTPATIENT)
Dept: CARDIAC REHAB | Facility: HOSPITAL | Age: 69
End: 2019-07-22

## 2019-07-22 DIAGNOSIS — Z95.5 STATUS POST INSERTION OF DRUG ELUTING CORONARY ARTERY STENT: Primary | ICD-10-CM

## 2019-07-22 PROCEDURE — 93798 PHYS/QHP OP CAR RHAB W/ECG: CPT

## 2019-07-24 ENCOUNTER — TELEPHONE (OUTPATIENT)
Dept: CARDIAC REHAB | Facility: HOSPITAL | Age: 69
End: 2019-07-24

## 2019-07-24 ENCOUNTER — TREATMENT (OUTPATIENT)
Dept: CARDIAC REHAB | Facility: HOSPITAL | Age: 69
End: 2019-07-24

## 2019-07-24 DIAGNOSIS — Z95.5 STATUS POST INSERTION OF DRUG ELUTING CORONARY ARTERY STENT: Primary | ICD-10-CM

## 2019-07-24 PROCEDURE — 93798 PHYS/QHP OP CAR RHAB W/ECG: CPT

## 2019-07-24 NOTE — TELEPHONE ENCOUNTER
Good morning Dr. Ashby. Vicente Baker heart rate reached 193 while recovering from exercise today. He claims to not be feeling like himself the last few weeks. I am going to fax over his report form today while at Cardiac Rehab.

## 2019-07-26 ENCOUNTER — TREATMENT (OUTPATIENT)
Dept: CARDIAC REHAB | Facility: HOSPITAL | Age: 69
End: 2019-07-26

## 2019-07-26 DIAGNOSIS — Z95.5 STATUS POST INSERTION OF DRUG ELUTING CORONARY ARTERY STENT: Primary | ICD-10-CM

## 2019-07-26 PROCEDURE — 93798 PHYS/QHP OP CAR RHAB W/ECG: CPT

## 2019-07-29 ENCOUNTER — TREATMENT (OUTPATIENT)
Dept: CARDIAC REHAB | Facility: HOSPITAL | Age: 69
End: 2019-07-29

## 2019-07-29 DIAGNOSIS — Z95.5 STATUS POST INSERTION OF DRUG ELUTING CORONARY ARTERY STENT: Primary | ICD-10-CM

## 2019-07-29 PROCEDURE — 93798 PHYS/QHP OP CAR RHAB W/ECG: CPT

## 2019-07-31 ENCOUNTER — TREATMENT (OUTPATIENT)
Dept: CARDIAC REHAB | Facility: HOSPITAL | Age: 69
End: 2019-07-31

## 2019-07-31 DIAGNOSIS — Z95.5 STATUS POST INSERTION OF DRUG ELUTING CORONARY ARTERY STENT: Primary | ICD-10-CM

## 2019-07-31 PROCEDURE — 93798 PHYS/QHP OP CAR RHAB W/ECG: CPT

## 2019-08-02 ENCOUNTER — TREATMENT (OUTPATIENT)
Dept: CARDIAC REHAB | Facility: HOSPITAL | Age: 69
End: 2019-08-02

## 2019-08-02 DIAGNOSIS — Z95.5 STATUS POST INSERTION OF DRUG ELUTING CORONARY ARTERY STENT: Primary | ICD-10-CM

## 2019-08-02 PROCEDURE — 93798 PHYS/QHP OP CAR RHAB W/ECG: CPT

## 2019-08-05 ENCOUNTER — TREATMENT (OUTPATIENT)
Dept: CARDIAC REHAB | Facility: HOSPITAL | Age: 69
End: 2019-08-05

## 2019-08-05 DIAGNOSIS — Z95.5 STATUS POST INSERTION OF DRUG ELUTING CORONARY ARTERY STENT: Primary | ICD-10-CM

## 2019-08-05 PROCEDURE — 93798 PHYS/QHP OP CAR RHAB W/ECG: CPT

## 2019-08-07 ENCOUNTER — TREATMENT (OUTPATIENT)
Dept: CARDIAC REHAB | Facility: HOSPITAL | Age: 69
End: 2019-08-07

## 2019-08-07 DIAGNOSIS — Z95.5 STATUS POST INSERTION OF DRUG ELUTING CORONARY ARTERY STENT: Primary | ICD-10-CM

## 2019-08-07 PROCEDURE — 93798 PHYS/QHP OP CAR RHAB W/ECG: CPT

## 2019-08-09 ENCOUNTER — TREATMENT (OUTPATIENT)
Dept: CARDIAC REHAB | Facility: HOSPITAL | Age: 69
End: 2019-08-09

## 2019-08-09 DIAGNOSIS — Z95.5 STATUS POST INSERTION OF DRUG ELUTING CORONARY ARTERY STENT: Primary | ICD-10-CM

## 2019-08-09 PROCEDURE — 93798 PHYS/QHP OP CAR RHAB W/ECG: CPT

## 2019-08-12 ENCOUNTER — TREATMENT (OUTPATIENT)
Dept: CARDIAC REHAB | Facility: HOSPITAL | Age: 69
End: 2019-08-12

## 2019-08-12 DIAGNOSIS — Z95.5 STATUS POST INSERTION OF DRUG ELUTING CORONARY ARTERY STENT: Primary | ICD-10-CM

## 2019-08-12 PROCEDURE — 93798 PHYS/QHP OP CAR RHAB W/ECG: CPT

## 2019-08-14 ENCOUNTER — TREATMENT (OUTPATIENT)
Dept: CARDIAC REHAB | Facility: HOSPITAL | Age: 69
End: 2019-08-14

## 2019-08-14 DIAGNOSIS — Z95.5 STATUS POST INSERTION OF DRUG ELUTING CORONARY ARTERY STENT: Primary | ICD-10-CM

## 2019-08-14 PROCEDURE — 93798 PHYS/QHP OP CAR RHAB W/ECG: CPT

## 2019-08-23 NOTE — PLAN OF CARE
Problem: Patient Care Overview  Goal: Plan of Care Review  Outcome: Ongoing (interventions implemented as appropriate)   05/15/19 0662   Coping/Psychosocial   Plan of Care Reviewed With patient   OTHER   Outcome Summary HR 34-50's. SBP 's. Otherwise VSS. Pt c/o of chest feeling heavy with HR mid/low 30's. Five beat run VT @ 0355. O2 1LNC for comfort. Tikosyn continues. Will continue to monitor closely.          Suturegard Retention Suture: 2-0 Nylon

## 2019-08-26 ENCOUNTER — APPOINTMENT (OUTPATIENT)
Dept: CARDIAC REHAB | Facility: HOSPITAL | Age: 69
End: 2019-08-26

## 2019-08-28 ENCOUNTER — APPOINTMENT (OUTPATIENT)
Dept: CARDIAC REHAB | Facility: HOSPITAL | Age: 69
End: 2019-08-28

## 2019-08-30 ENCOUNTER — APPOINTMENT (OUTPATIENT)
Dept: CARDIAC REHAB | Facility: HOSPITAL | Age: 69
End: 2019-08-30

## 2019-09-04 ENCOUNTER — APPOINTMENT (OUTPATIENT)
Dept: CARDIAC REHAB | Facility: HOSPITAL | Age: 69
End: 2019-09-04

## 2019-09-06 ENCOUNTER — APPOINTMENT (OUTPATIENT)
Dept: CARDIAC REHAB | Facility: HOSPITAL | Age: 69
End: 2019-09-06

## 2019-09-09 ENCOUNTER — APPOINTMENT (OUTPATIENT)
Dept: CARDIAC REHAB | Facility: HOSPITAL | Age: 69
End: 2019-09-09

## 2019-09-11 ENCOUNTER — APPOINTMENT (OUTPATIENT)
Dept: CARDIAC REHAB | Facility: HOSPITAL | Age: 69
End: 2019-09-11

## 2019-09-13 ENCOUNTER — APPOINTMENT (OUTPATIENT)
Dept: CARDIAC REHAB | Facility: HOSPITAL | Age: 69
End: 2019-09-13

## 2019-09-20 ENCOUNTER — OFFICE VISIT (OUTPATIENT)
Dept: CARDIOLOGY | Facility: CLINIC | Age: 69
End: 2019-09-20

## 2019-09-20 VITALS
HEART RATE: 81 BPM | BODY MASS INDEX: 31.02 KG/M2 | DIASTOLIC BLOOD PRESSURE: 72 MMHG | WEIGHT: 193 LBS | HEIGHT: 66 IN | SYSTOLIC BLOOD PRESSURE: 114 MMHG

## 2019-09-20 DIAGNOSIS — Z95.5 STATUS POST INSERTION OF DRUG-ELUTING STENT INTO LEFT ANTERIOR DESCENDING ARTERY: Primary | ICD-10-CM

## 2019-09-20 DIAGNOSIS — I48.19 PERSISTENT ATRIAL FIBRILLATION (HCC): ICD-10-CM

## 2019-09-20 PROCEDURE — 99214 OFFICE O/P EST MOD 30 MIN: CPT | Performed by: INTERNAL MEDICINE

## 2019-09-20 PROCEDURE — 93000 ELECTROCARDIOGRAM COMPLETE: CPT | Performed by: INTERNAL MEDICINE

## 2019-09-20 RX ORDER — METOPROLOL TARTRATE 50 MG/1
TABLET, FILM COATED ORAL
COMMUNITY
Start: 2019-09-17 | End: 2019-12-19 | Stop reason: ALTCHOICE

## 2019-09-20 NOTE — PROGRESS NOTES
Date of Office Visit: 2019  Encounter Provider: Aaron Barrios MD  Place of Service: [facility] CARDIOLOGY  Patient Name: Vicente Baker  : 1950    Subjective:     Encounter Date:2019      Patient ID: Vicente Baker is a 68 y.o. male who has a cc of  Perm AF and on rate control and he was adm to [facility] for 6 days for pneumonia. That is mostly better.   EF at [facility] 55%     He lso has CAD and stent in -- dr davalos is his CAD doc. And Dr green did Successful DICK of the proximal RCA in May 2019.     We've seen for AF and tried aad but he cannot tolerate them, and has adherence issues so we have decided on rate control.     No anginal chest pain,     He is recovering from his pneumonia. He is doing IS.    Mild brownlee,   No soa,   No fainting,  Occ  orthostasis.   No edema.   Exercise tolerance: pretty sedentary from the pneumonia     There have been no hospital admission since the last visit.     There have been no bleeding events.       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   • Typical atrial flutter (CMS/HCC)        Social History     Socioeconomic History   • Marital status:      Spouse name: Not on file   • Number of children: Not on file   • Years of education: Not on file   • Highest education level: Not on file   Tobacco Use   • Smoking status: Former Smoker     Packs/day: 0.50     Years: 37.00     Pack years: 18.50     Types: Cigarettes   •  "Smokeless tobacco: Never Used   • Tobacco comment: caffeine use   Substance and Sexual Activity   • Alcohol use: Yes     Alcohol/week: 0.6 oz     Types: 1 Glasses of wine per week     Frequency: Never     Comment: social   • Drug use: Defer   • Sexual activity: Defer       Review of Systems   Constitution: Negative for fever and night sweats.   HENT: Negative for ear pain and stridor.    Eyes: Negative for discharge and visual halos.   Cardiovascular: Negative for cyanosis.   Respiratory: Negative for hemoptysis and sputum production.    Hematologic/Lymphatic: Negative for adenopathy.   Skin: Negative for nail changes and unusual hair distribution.   Musculoskeletal: Negative for gout and joint swelling.   Gastrointestinal: Negative for bowel incontinence and flatus.   Genitourinary: Negative for dysuria and flank pain.   Neurological: Negative for seizures and tremors.   Psychiatric/Behavioral: Negative for altered mental status. The patient is not nervous/anxious.             Objective:     Vitals:    09/20/19 1115   BP: 114/72   BP Location: Right arm   Patient Position: Sitting   Cuff Size: Large Adult   Pulse: 81   Weight: 87.5 kg (193 lb)   Height: 167.6 cm (66\")         Physical Exam   Constitutional: He is oriented to person, place, and time.   HENT:   Head: Normocephalic and atraumatic.   Eyes: Right eye exhibits no discharge. Left eye exhibits no discharge.   Neck: No JVD present. No thyromegaly present.   Cardiovascular: Normal rate. An irregularly irregular rhythm present. Exam reveals no gallop and no friction rub.   No murmur heard.  Pulmonary/Chest: Effort normal and breath sounds normal. He has no rales.   Abdominal: Soft. Bowel sounds are normal. There is no tenderness.   Musculoskeletal: Normal range of motion. He exhibits no edema or deformity.   Neurological: He is alert and oriented to person, place, and time. He exhibits normal muscle tone.   Skin: Skin is warm and dry. No erythema. "   Psychiatric: He has a normal mood and affect. His behavior is normal. Thought content normal.         ECG 12 Lead  Date/Time: 9/20/2019 11:37 AM  Performed by: Aaron Barrios MD  Authorized by: Aaron Barrios MD   Comparison: compared with previous ECG   Similar to previous ECG  Rhythm: atrial fibrillation    Clinical impression: abnormal EKG            Lab Review:       Assessment:          Diagnosis Plan   1. Status post insertion of drug-eluting stent into left anterior descending artery     2. Persistent atrial fibrillation (CMS/[facility])            Plan:     Atrial Fibrillation and Atrial Flutter  Assessment  • The patient has permanent atrial fibrillation  • This is non-valvular in etiology  • The patient's CHADS2-VASc score is 3  • A DNM1FA1-YLDa score of 2 or more is considered a high risk for a thromboembolic event    Plan  • Continue rivaroxaban for antithrombotic therapy, bleeding issues discussed  • Continue beta blocker for rate control    Coronary Artery Disease  Assessment  • The patient has no angina    Plan  • Lifestyle modifications discussed include adhering to a heart healthy diet, avoidance of tobacco products, maintenance of a healthy weight, medication compliance, regular exercise and regular monitoring of cholesterol and blood pressure    Subjective - Objective  • There has been a previous stent procedure using DICK  • Current antiplatelet therapy includes clopidogrel 75 mg      He is ok.    I will defer to WD on how long to treat with r-ban and clopidogrel     We will see in 6 months     We disc weight loss and regular exercise.

## 2019-09-30 ENCOUNTER — TELEPHONE (OUTPATIENT)
Dept: CARDIOLOGY | Facility: CLINIC | Age: 69
End: 2019-09-30

## 2019-12-19 ENCOUNTER — HOSPITAL ENCOUNTER (OUTPATIENT)
Dept: GENERAL RADIOLOGY | Facility: HOSPITAL | Age: 69
Discharge: HOME | End: 2019-12-19
Admitting: NURSE PRACTITIONER

## 2019-12-19 ENCOUNTER — OFFICE VISIT (OUTPATIENT)
Dept: CARDIOLOGY | Facility: CLINIC | Age: 69
End: 2019-12-19

## 2019-12-19 ENCOUNTER — LAB (OUTPATIENT)
Dept: LAB | Facility: HOSPITAL | Age: 69
End: 2019-12-19

## 2019-12-19 VITALS
SYSTOLIC BLOOD PRESSURE: 120 MMHG | DIASTOLIC BLOOD PRESSURE: 88 MMHG | WEIGHT: 199.6 LBS | BODY MASS INDEX: 32.08 KG/M2 | OXYGEN SATURATION: 98 % | HEIGHT: 66 IN | HEART RATE: 63 BPM | RESPIRATION RATE: 18 BRPM

## 2019-12-19 DIAGNOSIS — R53.83 OTHER FATIGUE: ICD-10-CM

## 2019-12-19 DIAGNOSIS — R06.02 SHORTNESS OF BREATH: ICD-10-CM

## 2019-12-19 DIAGNOSIS — I48.0 PAROXYSMAL ATRIAL FIBRILLATION (HCC): ICD-10-CM

## 2019-12-19 DIAGNOSIS — I25.10 CAD IN NATIVE ARTERY: ICD-10-CM

## 2019-12-19 DIAGNOSIS — I48.21 PERMANENT ATRIAL FIBRILLATION (HCC): Primary | ICD-10-CM

## 2019-12-19 LAB
ALBUMIN SERPL-MCNC: 4.4 G/DL (ref 3.5–5.2)
ALBUMIN/GLOB SERPL: 1.5 G/DL
ALP SERPL-CCNC: 88 U/L (ref 39–117)
ALT SERPL W P-5'-P-CCNC: 61 U/L (ref 1–41)
ANION GAP SERPL CALCULATED.3IONS-SCNC: 13.1 MMOL/L (ref 5–15)
AST SERPL-CCNC: 45 U/L (ref 1–40)
BILIRUB SERPL-MCNC: 0.5 MG/DL (ref 0.2–1.2)
BUN BLD-MCNC: 22 MG/DL (ref 8–23)
BUN/CREAT SERPL: 20.4 (ref 7–25)
CALCIUM SPEC-SCNC: 9.1 MG/DL (ref 8.6–10.5)
CHLORIDE SERPL-SCNC: 105 MMOL/L (ref 98–107)
CO2 SERPL-SCNC: 26.9 MMOL/L (ref 22–29)
CREAT BLD-MCNC: 1.08 MG/DL (ref 0.76–1.27)
DEPRECATED RDW RBC AUTO: 49.3 FL (ref 37–54)
ERYTHROCYTE [DISTWIDTH] IN BLOOD BY AUTOMATED COUNT: 13.4 % (ref 12.3–15.4)
GFR SERPL CREATININE-BSD FRML MDRD: 68 ML/MIN/1.73
GLOBULIN UR ELPH-MCNC: 2.9 GM/DL
GLUCOSE BLD-MCNC: 78 MG/DL (ref 65–99)
HCT VFR BLD AUTO: 46.4 % (ref 37.5–51)
HGB BLD-MCNC: 15.8 G/DL (ref 13–17.7)
MCH RBC QN AUTO: 33.3 PG (ref 26.6–33)
MCHC RBC AUTO-ENTMCNC: 34.1 G/DL (ref 31.5–35.7)
MCV RBC AUTO: 97.7 FL (ref 79–97)
NT-PROBNP SERPL-MCNC: 589.7 PG/ML (ref 5–900)
PLATELET # BLD AUTO: 185 10*3/MM3 (ref 140–450)
PMV BLD AUTO: 10.5 FL (ref 6–12)
POTASSIUM BLD-SCNC: 4.7 MMOL/L (ref 3.5–5.2)
PROT SERPL-MCNC: 7.3 G/DL (ref 6–8.5)
RBC # BLD AUTO: 4.75 10*6/MM3 (ref 4.14–5.8)
SODIUM BLD-SCNC: 145 MMOL/L (ref 136–145)
WBC NRBC COR # BLD: 5.53 10*3/MM3 (ref 3.4–10.8)

## 2019-12-19 PROCEDURE — 83880 ASSAY OF NATRIURETIC PEPTIDE: CPT

## 2019-12-19 PROCEDURE — 36415 COLL VENOUS BLD VENIPUNCTURE: CPT

## 2019-12-19 PROCEDURE — 71046 X-RAY EXAM CHEST 2 VIEWS: CPT

## 2019-12-19 PROCEDURE — 99214 OFFICE O/P EST MOD 30 MIN: CPT | Performed by: NURSE PRACTITIONER

## 2019-12-19 PROCEDURE — 84443 ASSAY THYROID STIM HORMONE: CPT

## 2019-12-19 PROCEDURE — 80053 COMPREHEN METABOLIC PANEL: CPT

## 2019-12-19 PROCEDURE — 93000 ELECTROCARDIOGRAM COMPLETE: CPT | Performed by: NURSE PRACTITIONER

## 2019-12-19 PROCEDURE — 85027 COMPLETE CBC AUTOMATED: CPT

## 2019-12-19 RX ORDER — METOPROLOL SUCCINATE 100 MG/1
100 TABLET, EXTENDED RELEASE ORAL DAILY
COMMUNITY
End: 2019-12-20 | Stop reason: SDUPTHER

## 2019-12-19 NOTE — PROGRESS NOTES
Date of Office Visit: 2019  Encounter Provider: DERIK Jackson  Place of Service: [facility] CARDIOLOGY  Patient Name: Vicente Baker  :1950    Chief Complaint   Patient presents with   • Atrial Fibrillation     6 MTH FOLLOW UP   :     HPI: Vicente Baker is a 69 y.o. male, new to me, who presents today for follow-up.  Old records have been obtained and reviewed by me.  He is a patient of Dr. Ashby's and Dr. Barrios's with a past cardiac history significant for coronary artery disease and atrial fibrillation.  In , he underwent stent placement to his proximal circumflex.  In 2018, he had atrial fibrillation with RVR and has since been anticoagulated on Eliquis.  He had several episode of RVR and ultimately underwent a successful cardioversion in February of this year.  About 10 days later, he developed RVR in the setting of an acute viral illness.  He was rate controlled.  In May, he was admitted for dofetilide imitation and subsequent cardioversion.  He was discharged on dofetilide 500 mcg twice daily.  Three days later, he presented back with shortness of breath and ultimately underwent cardiac catheterization where he underwent DICK placement to a proximal RCA lesion.  Recommendations were for rivaroxaban and clopidogrel (no aspirin).   Then on 2019, he was subsequently cardioverted at a [facility] facility.  Then later that month he was noted to be in atrial fibrillation RVR while at cardiac rehab and he was sent to our office for further evaluation.  He was seen by Lima Alvarez and Dr. Barrios and placed on atenolol 50 mg daily.  [facility] aten[facility] and started him on metoprolol succinate.  In August of this year, he was hospitalized at [facility] with pneumonia.  He had an echocardiogram during that hospitalization which revealed an ejection fraction of 55% and no significant valvular  "abnormalities.  He was last seen in the office my Dr. Barrios on 9/20/2019 at which time he was doing well with no complaints of angina or heart failure.  No changes were made to his medical regimen, and he was advised to follow-up at his regularly scheduled appointment in December.       The patient states that he has had better days. Evidently over the past couple of weeks, he has noticed increased fatigue and becomes exhausted with very minimal activity.  It takes him a lot of effort to perform very simple tasks.  He has also been noticing more shortness of breath both at rest and with exertion.  He describes this episodes of \"forgetting to breathe\" and having to take deep breaths.  He denies any PND or orthopnea.  He also struggles with his sinuses which causes difficulty with his breathing at times.  He is complaining of feeling congested and having a cough.  He also feels he is \"retaining water\".  He is also complaining of frequent nosebleeds that have resulted in having to have his nose cauterized twice over the last couple of months.  He denies any exercise because he has just not had the energy.      Past Medical History:   Diagnosis Date   • Acute pulmonary edema (CMS/HCC)    • Atrial fibrillation (CMS/HCC)    • Bradycardia    • Coronary artery disease    • Dyspnea    • History of cardiac radiofrequency ablation    • Hyperlipidemia    • Myocardial infarction (CMS/HCC)    • PAF (paroxysmal atrial fibrillation) (CMS/HCC)    • Persistent atrial fibrillation    • Psoriasis    • Sinus bradycardia    • Status post insertion of drug-eluting stent into left anterior descending artery    • Thyroid cyst     ptcalls it a thyroid glossal cyst   • Typical atrial flutter (CMS/HCC)        Past Surgical History:   Procedure Laterality Date   • CARDIAC CATHETERIZATION     • CARDIAC CATHETERIZATION N/A 5/17/2019    Procedure: Left Heart Cath;  Surgeon: Mark Masterson MD;  Location: [facility] CATH INVASIVE LOCATION;  Service: " Cardiovascular   • CARDIAC CATHETERIZATION N/A 5/17/2019    Procedure: Coronary angiography;  Surgeon: Mark Masterson MD;  Location:  [facility] CATH INVASIVE LOCATION;  Service: Cardiovascular   • CARDIAC CATHETERIZATION N/A 5/17/2019    Procedure: Left ventriculography;  Surgeon: Mark Masterson MD;  Location:  [facility] CATH INVASIVE LOCATION;  Service: Cardiovascular   • CARDIAC CATHETERIZATION N/A 5/17/2019    Procedure: Stent DICK coronary;  Surgeon: Mark Masterson MD;  [facility];  Service: Cardiovascular   • CARDIAC ELECTROPHYSIOLOGY PROCEDURE N/A 4/24/2018    Procedure: Ablation atrial flutter;  Surgeon: Aaron Barrios MD;  [facility];  Service: Cardiovascular   • CORONARY ANGIOPLASTY WITH STENT PLACEMENT  2006   • KNEE ARTHROSCOPY W/ MENISCAL REPAIR Left    • THYROID SURGERY      cyst removal   • TONSILLECTOMY         Social History     Socioeconomic History   • Marital status:      Spouse name: Not on file   • Number of children: Not on file   • Years of education: Not on file   • Highest education level: Not on file   Tobacco Use   • Smoking status: Former Smoker     Packs/day: 0.50     Years: 37.00     Pack years: 18.50     Types: Cigarettes   • Smokeless tobacco: Never Used   • Tobacco comment: NO CAFFEINE USE   Substance and Sexual Activity   • Alcohol use: Yes     Alcohol/week: 1.0 standard drinks     Types: 1 Glasses of wine per week     Frequency: Never     Comment: social   • Drug use: Defer   • Sexual activity: Defer       Family History   Problem Relation Age of Onset   • No Known Problems Mother    • Esophageal cancer Father    • No Known Problems Sister    • No Known Problems Brother        Review of Systems   Constitution: Positive for malaise/fatigue. Negative for chills and fever.   Cardiovascular: Positive for dyspnea on exertion and palpitations. Negative for chest pain, leg swelling, near-syncope, orthopnea, paroxysmal nocturnal dyspnea and  "syncope.   Respiratory: Negative for cough and shortness of breath.    Musculoskeletal: Negative for joint pain, joint swelling and myalgias.   Gastrointestinal: Negative for abdominal pain, diarrhea, melena, nausea and vomiting.   Genitourinary: Negative for frequency and hematuria.   Neurological: Negative for light-headedness, numbness, paresthesias and seizures.   Allergic/Immunologic: Negative.    All other systems reviewed and are negative.      Allergies   Allergen Reactions   • No Known Drug Allergy          Current Outpatient Medications:   •  ALPRAZolam (XANAX) 0.25 MG tablet, Take 1 tablet by mouth At Night As Needed for Anxiety., Disp: 10 tablet, Rfl: 0  •  ascorbic acid (VITAMIN C) 1000 MG tablet, Take 1,000 mg by mouth Daily., Disp: , Rfl:   •  atorvastatin (LIPITOR) 80 MG tablet, Take 80 mg by mouth Daily., Disp: , Rfl:   •  clopidogrel (PLAVIX) 75 MG tablet, Take 1 tablet by mouth Daily., Disp: 90 tablet, Rfl: 3  •  metoprolol succinate XL (TOPROL-XL) 50 MG 24 hr tablet, Take 1 tablet by mouth Daily., Disp: 30 tablet, Rfl: 11  •  rivaroxaban (XARELTO) 20 MG tablet, Take 1 tablet by mouth Daily With Dinner., Disp: 30 tablet, Rfl: 11  •  CINNAMON PO, Take 1 tablet by mouth Daily., Disp: , Rfl:   •  Multiple Vitamins-Minerals (CENTRUM SILVER) tablet, Take 1 tablet by mouth Daily., Disp: , Rfl:       Objective:     Vitals:    12/19/19 1422 12/19/19 1432   BP: 126/94 120/88   BP Location: Right arm Left arm   Patient Position: Sitting Sitting   Pulse: 63    Resp: 18    SpO2: 98%    Weight: 90.5 kg (199 lb 9.6 oz)    Height: 167.6 cm (66\")      Body mass index is 32.22 kg/m².    PHYSICAL EXAM:    Physical Exam   Constitutional: He is oriented to person, place, and time. He appears well-developed and well-nourished. No distress.   HENT:   Head: Normocephalic and atraumatic.   Eyes: Pupils are equal, round, and reactive to light.   Neck: No JVD present. No thyromegaly present.   Cardiovascular: Normal rate, " normal heart sounds and intact distal pulses. An irregularly irregular rhythm present.   No murmur heard.  Pulmonary/Chest: Effort normal. No respiratory distress. He has wheezes.   Abdominal: Soft. Bowel sounds are normal. He exhibits no distension. There is no splenomegaly or hepatomegaly. There is no tenderness.   Musculoskeletal: Normal range of motion. He exhibits no edema.   Neurological: He is alert and oriented to person, place, and time.   Skin: Skin is warm and dry. He is not diaphoretic. No erythema.   Psychiatric: He has a normal mood and affect. His behavior is normal. Judgment normal.         ECG 12 Lead  Date/Time: 12/19/2019 2:39 PM  Performed by: Sherrie Virgen APRN  Authorized by: Sherrie Virgen APRN   Comparison: compared with previous ECG from 9/20/2019  Similar to previous ECG  Rhythm: atrial fibrillation  Ectopy: unifocal PVCs  Rate: normal  BPM: 58  T inversion: V5 and V6    Clinical impression: abnormal EKG  Comments: Indication: Atrial fibrillation              Assessment:       Diagnosis Plan   1. Permanent atrial fibrillation  ECG 12 Lead    Comprehensive Metabolic Panel    CBC (No Diff)    proBNP    XR Chest 2 View   2. CAD in native artery  Comprehensive Metabolic Panel    CBC (No Diff)    proBNP    XR Chest 2 View   3. Shortness of breath  Comprehensive Metabolic Panel    CBC (No Diff)    proBNP    XR Chest 2 View   4. Other fatigue  Comprehensive Metabolic Panel    CBC (No Diff)    proBNP    XR Chest 2 View     Orders Placed This Encounter   Procedures   • XR Chest 2 View     Standing Status:   Future     Number of Occurrences:   1     Standing Expiration Date:   12/19/2020     Order Specific Question:   Reason for Exam:     Answer:   shortness of breath   • Comprehensive Metabolic Panel     Standing Status:   Future     Number of Occurrences:   1     Standing Expiration Date:   12/19/2020   • CBC (No Diff)     Standing Status:   Future     Number of Occurrences:   1      Standing Expiration Date:   12/19/2020   • proBNP     Standing Status:   Future     Number of Occurrences:   1     Standing Expiration Date:   12/19/2020   • ECG 12 Lead     This order was created via procedure documentation          Plan:       1.  Permanent atrial fibrillation.  He is in atrial fibrillation today with a controlled rate.  He remains anticoagulated on Xarelto.  He is rate controlled on Toprol.       2.  Coronary artery disease.  In May of this year, he underwent drug-eluting stent placement to a proximal RCA lesion.  At that time, he has a patent stent in the circumflex and mild-moderate disease in his remaining coronaries (30% left main, 30% proximal LAD, 50% mid LAD, 40% proximal diagonal, 30% distal diagonal, and 20% distal RCA).  He has been maintained on Xarelto and Plavix.  He denies any chest pain.  I feel the chances of coronary disease that has progressed to the point of causing these symptoms is low.       3.  Shortness of breath.  Echocardiogram from August revealed normal LV function and no significant valvular abnormalities.  He does not appear to be in heart failure.  He appears euvolemic.  For thoroughness sake, I am going to check a proBNP and CXR.  I am also going to check some baseline labs to rule out underlying causes like anemia.      4.  Fatigue.  I am not sure what to make of his persistent fatigue.  It seems to have been an issue for him since his pneumonia several months ago, although he reports it is certainly worse over the last few weeks.  I am going to check some baseline labs including a CBC and CMP.      Overall I think he is stable.  While his symptoms of fatigue and shortness of breath could very well be an anginal equivalent, he just had a catheterization in May of this year that revealed only mild-moderate disease, other than his RCA for which he received a DICK.  I do feel it is unlikely to have a progression of his disease in this amount of time.  I would like to  discuss the plan of care with Dr. Ashby before making further recommendations.  I will call the patient with the results of his lab work and my recommendations based on discussions with Dr. Ashby.        Addendum 12/20/2019: I spoke with Dr. Ashby regarding the plan of care.  We reviewed his lab results which are all normal.  I also added a TSH.  We also reviewed his prior catheterization images.  Dr. Ashby does not feel his symptoms are anginal.  Per his recommendation, I am going to half his dose of Toprol to 50 mg daily.  We will touch base in two weeks.  Further recommendations will be made at that time.  I spoke to the patient and he verbalized understanding.       As always, it has been a pleasure to participate in your patient's care.      Sincerely,         DERIK Person

## 2019-12-20 ENCOUNTER — TELEPHONE (OUTPATIENT)
Dept: CARDIOLOGY | Facility: CLINIC | Age: 69
End: 2019-12-20

## 2019-12-20 DIAGNOSIS — R53.83 OTHER FATIGUE: Primary | ICD-10-CM

## 2019-12-20 LAB — TSH SERPL DL<=0.05 MIU/L-ACNC: 1.17 UIU/ML (ref 0.27–4.2)

## 2019-12-20 RX ORDER — ATORVASTATIN CALCIUM 80 MG/1
80 TABLET, FILM COATED ORAL DAILY
COMMUNITY

## 2019-12-20 RX ORDER — METOPROLOL SUCCINATE 50 MG/1
50 TABLET, EXTENDED RELEASE ORAL DAILY
Qty: 30 TABLET | Refills: 11 | Status: SHIPPED | OUTPATIENT
Start: 2019-12-20

## 2019-12-20 NOTE — TELEPHONE ENCOUNTER
"S/W pt re: medications being taken. Pt stated he made a mistake and he IS taking the Lipitor 80 mg. Pt was \"mixed up\" and was told to stop the Lisinopril. Pt stated he's taking Atorvastatin, so it had him a little confused because of the \"L\" name. He wants to know if his instructions that were just given to him were the same??  Please advised ofd any recommendations.    BOOGIE Holcomb  "

## 2019-12-20 NOTE — TELEPHONE ENCOUNTER
It's okay.  Tell him to stick with the original plan.  He will stay on the atorvastatin and the new dose of metoprolol, 50 mg.

## 2020-01-03 ENCOUNTER — TELEPHONE (OUTPATIENT)
Dept: CARDIOLOGY | Facility: CLINIC | Age: 70
End: 2020-01-03

## 2020-01-03 NOTE — TELEPHONE ENCOUNTER
I spoke with him to follow-up on his fatigue since decreasing the dose of the metoprolol.  He reports an improvement in how he feels.  He will follow-up with Dr. Ashby in 6 months.  I told him to call me should he develop any new or worsening symptoms.

## 2020-05-29 ENCOUNTER — OFFICE VISIT (OUTPATIENT)
Dept: CARDIOLOGY | Facility: CLINIC | Age: 70
End: 2020-05-29

## 2020-05-29 VITALS
DIASTOLIC BLOOD PRESSURE: 82 MMHG | HEART RATE: 77 BPM | SYSTOLIC BLOOD PRESSURE: 118 MMHG | WEIGHT: 190 LBS | BODY MASS INDEX: 30.53 KG/M2 | HEIGHT: 66 IN

## 2020-05-29 DIAGNOSIS — I48.11 LONGSTANDING PERSISTENT ATRIAL FIBRILLATION (HCC): ICD-10-CM

## 2020-05-29 DIAGNOSIS — Z95.5 STATUS POST INSERTION OF DRUG-ELUTING STENT INTO LEFT ANTERIOR DESCENDING ARTERY: Primary | ICD-10-CM

## 2020-05-29 PROCEDURE — 99214 OFFICE O/P EST MOD 30 MIN: CPT | Performed by: INTERNAL MEDICINE

## 2020-05-29 PROCEDURE — 93000 ELECTROCARDIOGRAM COMPLETE: CPT | Performed by: INTERNAL MEDICINE

## 2020-06-25 ENCOUNTER — TELEPHONE (OUTPATIENT)
Dept: CARDIOLOGY | Facility: CLINIC | Age: 70
End: 2020-06-25

## 2020-06-25 NOTE — TELEPHONE ENCOUNTER
Patient calling needing to have 2 teeth pulled. He is on Plavix and Xarleto  Please advise.    [phone]

## 2020-06-25 NOTE — TELEPHONE ENCOUNTER
Called spoke with patient and told him to stop the xarelto two days prior and the plavix 5 days prior  Patient is not on aspirin

## 2020-06-25 NOTE — TELEPHONE ENCOUNTER
He should stop the Xarelto two days prior.  As far as the Plavix, Dr. Ashby does he need to stop it?  If so, should he at least be on aspirin?

## 2021-03-19 ENCOUNTER — BULK ORDERING (OUTPATIENT)
Dept: CASE MANAGEMENT | Facility: OTHER | Age: 71
End: 2021-03-19

## 2021-03-19 DIAGNOSIS — Z23 IMMUNIZATION DUE: ICD-10-CM

## 2021-04-18 ENCOUNTER — HOSPITAL ENCOUNTER (EMERGENCY)
Facility: HOSPITAL | Age: 71
Discharge: HOME | End: 2021-04-18
Attending: EMERGENCY MEDICINE | Admitting: EMERGENCY MEDICINE

## 2021-04-18 ENCOUNTER — APPOINTMENT (OUTPATIENT)
Dept: GENERAL RADIOLOGY | Facility: HOSPITAL | Age: 71
End: 2021-04-18

## 2021-04-18 VITALS
DIASTOLIC BLOOD PRESSURE: 82 MMHG | HEIGHT: 66 IN | HEART RATE: 68 BPM | BODY MASS INDEX: 31.82 KG/M2 | RESPIRATION RATE: 18 BRPM | WEIGHT: 198 LBS | TEMPERATURE: 98.2 F | SYSTOLIC BLOOD PRESSURE: 118 MMHG | OXYGEN SATURATION: 98 %

## 2021-04-18 DIAGNOSIS — I48.20 CHRONIC ATRIAL FIBRILLATION (HCC): ICD-10-CM

## 2021-04-18 DIAGNOSIS — R00.2 PALPITATIONS: Primary | ICD-10-CM

## 2021-04-18 DIAGNOSIS — I10 POORLY-CONTROLLED HYPERTENSION: ICD-10-CM

## 2021-04-18 LAB
ALBUMIN SERPL-MCNC: 4 G/DL (ref 3.5–5.2)
ALBUMIN/GLOB SERPL: 2.1 G/DL
ALP SERPL-CCNC: 95 U/L (ref 39–117)
ALT SERPL W P-5'-P-CCNC: 45 U/L (ref 1–41)
ANION GAP SERPL CALCULATED.3IONS-SCNC: 10.9 MMOL/L (ref 5–15)
AST SERPL-CCNC: 37 U/L (ref 1–40)
BASOPHILS # BLD AUTO: 0.06 10*3/MM3 (ref 0–0.2)
BASOPHILS NFR BLD AUTO: 1.1 % (ref 0–1.5)
BILIRUB SERPL-MCNC: 0.4 MG/DL (ref 0–1.2)
BUN SERPL-MCNC: 23 MG/DL (ref 8–23)
BUN/CREAT SERPL: 22.3 (ref 7–25)
CALCIUM SPEC-SCNC: 8.5 MG/DL (ref 8.6–10.5)
CHLORIDE SERPL-SCNC: 102 MMOL/L (ref 98–107)
CO2 SERPL-SCNC: 25.1 MMOL/L (ref 22–29)
CREAT SERPL-MCNC: 1.03 MG/DL (ref 0.76–1.27)
DEPRECATED RDW RBC AUTO: 47.6 FL (ref 37–54)
EOSINOPHIL # BLD AUTO: 0.16 10*3/MM3 (ref 0–0.4)
EOSINOPHIL NFR BLD AUTO: 2.8 % (ref 0.3–6.2)
ERYTHROCYTE [DISTWIDTH] IN BLOOD BY AUTOMATED COUNT: 13.1 % (ref 12.3–15.4)
GFR SERPL CREATININE-BSD FRML MDRD: 72 ML/MIN/1.73
GLOBULIN UR ELPH-MCNC: 1.9 GM/DL
GLUCOSE SERPL-MCNC: 112 MG/DL (ref 65–99)
HCT VFR BLD AUTO: 46.1 % (ref 37.5–51)
HGB BLD-MCNC: 15.3 G/DL (ref 13–17.7)
IMM GRANULOCYTES # BLD AUTO: 0.02 10*3/MM3 (ref 0–0.05)
IMM GRANULOCYTES NFR BLD AUTO: 0.4 % (ref 0–0.5)
LYMPHOCYTES # BLD AUTO: 1.87 10*3/MM3 (ref 0.7–3.1)
LYMPHOCYTES NFR BLD AUTO: 33.3 % (ref 19.6–45.3)
MAGNESIUM SERPL-MCNC: 1.9 MG/DL (ref 1.6–2.4)
MCH RBC QN AUTO: 32.6 PG (ref 26.6–33)
MCHC RBC AUTO-ENTMCNC: 33.2 G/DL (ref 31.5–35.7)
MCV RBC AUTO: 98.1 FL (ref 79–97)
MONOCYTES # BLD AUTO: 0.54 10*3/MM3 (ref 0.1–0.9)
MONOCYTES NFR BLD AUTO: 9.6 % (ref 5–12)
NEUTROPHILS NFR BLD AUTO: 2.97 10*3/MM3 (ref 1.7–7)
NEUTROPHILS NFR BLD AUTO: 52.8 % (ref 42.7–76)
NRBC BLD AUTO-RTO: 0 /100 WBC (ref 0–0.2)
PLATELET # BLD AUTO: 218 10*3/MM3 (ref 140–450)
PMV BLD AUTO: 10.7 FL (ref 6–12)
POTASSIUM SERPL-SCNC: 4 MMOL/L (ref 3.5–5.2)
PROT SERPL-MCNC: 5.9 G/DL (ref 6–8.5)
QT INTERVAL: 404 MS
RBC # BLD AUTO: 4.7 10*6/MM3 (ref 4.14–5.8)
SODIUM SERPL-SCNC: 138 MMOL/L (ref 136–145)
TROPONIN T SERPL-MCNC: <0.01 NG/ML (ref 0–0.03)
WBC # BLD AUTO: 5.62 10*3/MM3 (ref 3.4–10.8)

## 2021-04-18 PROCEDURE — 93005 ELECTROCARDIOGRAM TRACING: CPT | Performed by: EMERGENCY MEDICINE

## 2021-04-18 PROCEDURE — 80053 COMPREHEN METABOLIC PANEL: CPT | Performed by: EMERGENCY MEDICINE

## 2021-04-18 PROCEDURE — 71045 X-RAY EXAM CHEST 1 VIEW: CPT

## 2021-04-18 PROCEDURE — 85025 COMPLETE CBC W/AUTO DIFF WBC: CPT | Performed by: EMERGENCY MEDICINE

## 2021-04-18 PROCEDURE — 99285 EMERGENCY DEPT VISIT HI MDM: CPT

## 2021-04-18 PROCEDURE — 93010 ELECTROCARDIOGRAM REPORT: CPT | Performed by: INTERNAL MEDICINE

## 2021-04-18 PROCEDURE — 83735 ASSAY OF MAGNESIUM: CPT | Performed by: EMERGENCY MEDICINE

## 2021-04-18 PROCEDURE — 84484 ASSAY OF TROPONIN QUANT: CPT | Performed by: EMERGENCY MEDICINE

## 2021-04-18 RX ORDER — CLONIDINE HYDROCHLORIDE 0.1 MG/1
0.1 TABLET ORAL ONCE
Status: COMPLETED | OUTPATIENT
Start: 2021-04-18 | End: 2021-04-18

## 2021-04-18 RX ORDER — SODIUM CHLORIDE 0.9 % (FLUSH) 0.9 %
10 SYRINGE (ML) INJECTION AS NEEDED
Status: DISCONTINUED | OUTPATIENT
Start: 2021-04-18 | End: 2021-04-18 | Stop reason: HOSPADM

## 2021-04-18 RX ADMIN — CLONIDINE HYDROCHLORIDE 0.1 MG: 0.1 TABLET ORAL at 05:54

## 2021-04-18 NOTE — ED PROVIDER NOTES
" EMERGENCY DEPARTMENT ENCOUNTER    Room Number:  11/11  Date of encounter:  4/18/2021  PCP: Betty Schultz MD  Historian: Patient     I used full protective equipment while examining this patient.  This includes face mask, gloves and protective eyewear.  I washed my hands before entering the room and immediately upon leaving the room.  Patient was wearing a surgical mask.      HPI:  Chief Complaint: Palpitations  A complete HPI/ROS/PMH/PSH/SH/FH are unobtainable due to: None    Context: Vicente Baker is a 70 y.o. male who presents to the ED c/o sudden onset of palpitations that began around 1 AM while the patient was lying in bed..  Patient says he felt like his heart was fluttering and \"vibrating\".  He reports feeling lightheaded and faint.  Denies chest pain, shortness of breath, nausea, vomiting, sweating, or syncope.  He also reports that his right arm felt heavy for several minutes.  He was able to move it and denies having any focal weakness during this episode.  Patient checked his blood pressure during this episode and it was ranging from 170-180s/110-120s.  Nothing made his symptoms better or worse.  He has a history of atrial fibrillation and is on Xarelto and metoprolol.  States that he started cutting his Toprol XL pill in half and taking half in the morning and half at night several days ago.  Denies recent illness, cough, fever, or headache.  Patient states he much better and only reports mild dizziness.      PAST MEDICAL HISTORY  Active Ambulatory Problems     Diagnosis Date Noted   • CAD in native artery 03/06/2017   • Allergic rhinitis due to pollen 03/07/2018   • Status post insertion of drug-eluting stent into left anterior descending artery 03/07/2018   • Hyperlipidemia LDL goal <70 10/16/2017   • Psoriasis 05/23/2017   • Typical atrial flutter (CMS/HCC) 03/19/2018   • History of cardiac radiofrequency ablation 05/07/2018   • Atrial fibrillation (CMS/HCC) 06/15/2018   • Chest pain " 01/15/2019   • Lactic acidosis 02/22/2019   • Generalized weakness 02/22/2019   • Alcohol use 02/22/2019   • Elevated lactic acid level 02/22/2019   • LAURA (acute kidney injury) (CMS/HCC) 02/26/2019   • Sinus congestion 02/26/2019   • Insomnia due to medical condition 02/28/2019   • Sinus bradycardia 05/17/2019   • Dyspnea 05/17/2019     Resolved Ambulatory Problems     Diagnosis Date Noted   • Persistent atrial fibrillation (CMS/[facility]) 02/12/2019   • Atrial fibrillation with RVR (CMS/HCC) 02/26/2019   • A-fib (CMS/HCC) 05/13/2019     Past Medical History:   Diagnosis Date   • Acute pulmonary edema (CMS/[facility])    • Bradycardia    • Coronary artery disease    • Hyperlipidemia    • Myocardial infarction (CMS/HCC)    • Other fatigue    • PAF (paroxysmal atrial fibrillation) (CMS/HCC)    • Permanent atrial fibrillation (CMS/HCC)    • Shortness of breath    • Thyroid cyst          PAST SURGICAL HISTORY  Past Surgical History:   Procedure Laterality Date   • CARDIAC CATHETERIZATION     • CARDIAC CATHETERIZATION N/A 5/17/2019    Procedure: Left Heart Cath;  Surgeon: Mark Masterson MD;  Location: [facility] INVASIVE LOCATION;  Service: Cardiovascular   • CARDIAC CATHETERIZATION N/A 5/17/2019    Procedure: Coronary angiography;  Surgeon: Mark Masterson MD;  Location: [facility] CATH INVASIVE LOCATION;  Service: Cardiovascular   • CARDIAC CATHETERIZATION N/A 5/17/2019    Procedure: Left ventriculography;  Surgeon: Mark Masterson MD;  Location: [facility] CATH INVASIVE LOCATION;  Service: Cardiovascular   • CARDIAC CATHETERIZATION N/A 5/17/2019    Procedure: Stent DICK coronary;  Surgeon: Mark Masterson MD;  Location: [facility] CATH INVASIVE LOCATION;  Service: Cardiovascular   • CARDIAC ELECTROPHYSIOLOGY PROCEDURE N/A 4/24/2018    Procedure: Ablation atrial flutter;  Surgeon: Aaron Barrios MD;  Location: [facility] CATH INVASIVE LOCATION;  Service: Cardiovascular   • CORONARY ANGIOPLASTY WITH STENT PLACEMENT  2006   • KNEE ARTHROSCOPY W/ MENISCAL REPAIR  Left    • THYROID SURGERY      cyst removal   • TONSILLECTOMY           FAMILY HISTORY  Family History   Problem Relation Age of Onset   • No Known Problems Mother    • Esophageal cancer Father    • No Known Problems Sister    • No Known Problems Brother          SOCIAL HISTORY  Social History     Socioeconomic History   • Marital status:      Spouse name: Not on file   • Number of children: Not on file   • Years of education: Not on file   • Highest education level: Not on file   Tobacco Use   • Smoking status: Former Smoker     Packs/day: 0.50     Years: 37.00     Pack years: 18.50     Types: Cigarettes   • Smokeless tobacco: Never Used   • Tobacco comment: NO CAFFEINE USE   Substance and Sexual Activity   • Alcohol use: Yes     Alcohol/week: 1.0 standard drinks     Types: 1 Glasses of wine per week     Comment: social   • Drug use: Defer   • Sexual activity: Defer         ALLERGIES  Patient has no known allergies.       REVIEW OF SYSTEMS  Review of Systems      All systems have been reviewed and are negative except as as discussed in the HPI    PHYSICAL EXAM    I have reviewed the triage vital signs and nursing notes.    ED Triage Vitals [04/18/21 0222]   Temp Heart Rate Resp BP SpO2   98.2 °F (36.8 °C) 87 18 (!) 178/109 99 %      Temp src Heart Rate Source Patient Position BP Location FiO2 (%)   -- Monitor -- -- --       Physical Exam  GENERAL: Awake, alert, well-appearing  HENT: NCAT, nares patent, moist mucous membranes  NECK: supple  EYES: Extraocular muscles intact, no scleral icterus  CV: Irregularly irregular rhythm, regular rate, equal radial pulses bilaterally  RESPIRATORY: normal effort, clear to auscultation bilaterally  ABDOMEN: soft, nontender  MUSCULOSKELETAL: Extremities are nontender and without obvious deformity.  There is normal range of motion in all extremities.  There is no calf tenderness or pedal edema  NEURO:  Speech and mentation are unremarkable.  No facial droop.  Normal strength  and light touch sensation all extremities  SKIN: warm, dry, no rash  PSYCH: Normal mood and affect      LAB RESULTS  Recent Results (from the past 24 hour(s))   Comprehensive Metabolic Panel    Collection Time: 04/18/21  2:52 AM    Specimen: Blood   Result Value Ref Range    Glucose 112 (H) 65 - 99 mg/dL    BUN 23 8 - 23 mg/dL    Creatinine 1.03 0.76 - 1.27 mg/dL    Sodium 138 136 - 145 mmol/L    Potassium 4.0 3.5 - 5.2 mmol/L    Chloride 102 98 - 107 mmol/L    CO2 25.1 22.0 - 29.0 mmol/L    Calcium 8.5 (L) 8.6 - 10.5 mg/dL    Total Protein 5.9 (L) 6.0 - 8.5 g/dL    Albumin 4.00 3.50 - 5.20 g/dL    ALT (SGPT) 45 (H) 1 - 41 U/L    AST (SGOT) 37 1 - 40 U/L    Alkaline Phosphatase 95 39 - 117 U/L    Total Bilirubin 0.4 0.0 - 1.2 mg/dL    eGFR Non African Amer 72 >60 mL/min/1.73    Globulin 1.9 gm/dL    A/G Ratio 2.1 g/dL    BUN/Creatinine Ratio 22.3 7.0 - 25.0    Anion Gap 10.9 5.0 - 15.0 mmol/L   Troponin    Collection Time: 04/18/21  2:52 AM    Specimen: Blood   Result Value Ref Range    Troponin T <0.010 0.000 - 0.030 ng/mL   Magnesium    Collection Time: 04/18/21  2:52 AM    Specimen: Blood   Result Value Ref Range    Magnesium 1.9 1.6 - 2.4 mg/dL   CBC Auto Differential    Collection Time: 04/18/21  2:52 AM    Specimen: Blood   Result Value Ref Range    WBC 5.62 3.40 - 10.80 10*3/mm3    RBC 4.70 4.14 - 5.80 10*6/mm3    Hemoglobin 15.3 13.0 - 17.7 g/dL    Hematocrit 46.1 37.5 - 51.0 %    MCV 98.1 (H) 79.0 - 97.0 fL    MCH 32.6 26.6 - 33.0 pg    MCHC 33.2 31.5 - 35.7 g/dL    RDW 13.1 12.3 - 15.4 %    RDW-SD 47.6 37.0 - 54.0 fl    MPV 10.7 6.0 - 12.0 fL    Platelets 218 140 - 450 10*3/mm3    Neutrophil % 52.8 42.7 - 76.0 %    Lymphocyte % 33.3 19.6 - 45.3 %    Monocyte % 9.6 5.0 - 12.0 %    Eosinophil % 2.8 0.3 - 6.2 %    Basophil % 1.1 0.0 - 1.5 %    Immature Grans % 0.4 0.0 - 0.5 %    Neutrophils, Absolute 2.97 1.70 - 7.00 10*3/mm3    Lymphocytes, Absolute 1.87 0.70 - 3.10 10*3/mm3    Monocytes, Absolute 0.54 0.10  - 0.90 10*3/mm3    Eosinophils, Absolute 0.16 0.00 - 0.40 10*3/mm3    Basophils, Absolute 0.06 0.00 - 0.20 10*3/mm3    Immature Grans, Absolute 0.02 0.00 - 0.05 10*3/mm3    nRBC 0.0 0.0 - 0.2 /100 WBC       Ordered the above labs and independently reviewed the results.      RADIOLOGY  XR Chest 1 View    Result Date: 4/18/2021  SINGLE VIEW OF THE CHEST  HISTORY: Palpitations  COMPARISON: 12/19/2019  FINDINGS: There is mild cardiomegaly. There is no vascular congestion. There are background emphysematous changes. No pneumothorax, pleural effusion, or acute infiltrate is seen.      No acute findings.  This report was finalized on 4/18/2021 3:16 AM by Dr. Evangelina Key M.D.        I ordered the above noted radiological studies. Reviewed by me and discussed with radiologist.  See dictation for official radiology interpretation.      PROCEDURES  Procedures      MEDICATIONS GIVEN IN ER    Medications   sodium chloride 0.9 % flush 10 mL (has no administration in time range)   cloNIDine (CATAPRES) tablet 0.1 mg (0.1 mg Oral Given 4/18/21 0554)         PROGRESS, DATA ANALYSIS, CONSULTS, AND MEDICAL DECISION MAKING    All labs have been independently reviewed by me.  All radiology studies have been reviewed by me and discussed with radiologist dictating the report.   EKG's independently viewed and interpreted by me.  I have reviewed the nurse's notes, vital signs, past medical history, and medication list.  Discussion below represents my analysis of pertinent findings related to patient's condition, differential diagnosis, treatment plan and final disposition.      ED Course as of Apr 18 0608   Sun Apr 18, 2021   0231 Old records reviewed.  Patient was last seen here in the ED in February 2019 for shortness of breath.  Cardiac cath was done in May 2019.  Stent was placed in the RCA at that time.    [WH]   0321 EKG          EKG time: 3:08 AM  Rhythm/Rate: Atrial fibrillation, rate 75  P waves and MN: Irregular,  varying  QRS, axis: Small inferior Q waves  ST and T waves: Nonspecific ST changes laterally    Interpreted Contemporaneously by me at 3:16 AM, independently viewed  EKG is changed compared to prior EKG done 5/18/2019.  Sinus bradycardia present at that time.      [WH]   0532 Test results discussed with the patient.  He is resting comfortably.  He remains in A. fib.  Rate is in the 70s.  Blood pressure is currently 156/109.  Patient will be given a dose of clonidine prior to discharge.  He was advised to check and record his blood pressure regularly.  He states he is scheduled to have an [facility].  Patient was advised to follow-up with his PCP and his cardiologist.  Return precautions were discussed.    [WH]   0607 Patient's work-up was unremarkable.  He has chronic A. fib but his rate was well controlled in the ED.  He was hypertensive and was given clonidine.  Suspect his symptoms were due to rapid A. fib.  Patient is going to have an echocardiogram done this week.    [WH]      ED Course User Index  [WH] Kermit Feng MD       AS OF 06:08 EDT VITALS:    BP - (!) 145/111  HR - 61  TEMP - 98.2 °F (36.8 °C)  O2 SATS - 96%      DIAGNOSIS  Final diagnoses:   Palpitations   Chronic atrial fibrillation (CMS/HCC)   Poorly-controlled hypertension         DISPOSITION  Discharge    DISCHARGE    Patient discharged in stable condition.    Reviewed implications of results, diagnosis, meds, responsibility to follow up, warning signs and symptoms of possible worsening, potential complications and reasons to return to ER, including worsening or persistent symptoms, fainting, chest pain, shortness of breath, or other concern..    Patient/Family voiced understanding of above instructions.    Discussed plan for discharge, as there is no emergent indication for admission. Patient referred to primary care provider for BP management due to today's BP. Pt/family is agreeable and understands need for  follow up and repeat testing.  Pt is aware that discharge does not mean that nothing is wrong but it indicates no emergency is present that requires admission and they must continue care with follow-up as given below or physician of their choice.     FOLLOW-UP  Betty Schultz MD  [id number]  [place] [id number] [phone]    Schedule an appointment as soon as possible for a visit            Medication List      No changes were made to your prescriptions during this visit.           Dictated utilizing Dragon dictation:  Much of this encounter note is an electronic transcription/translation of spoken language to printed text. The electronic translation of spoken language may permit erroneous, or at times, nonsensical words or phrases to be inadvertently transcribed; Although I have reviewed the note for such errors, some may still exist.     Kermit Feng MD  04/18/21 0608

## 2021-04-18 NOTE — ED NOTES
To ER via EMS from home.  C/o palpitations and dizziness and rt arm numbness and weakness.  Hx a-fib. Pt currently in a fib at controlled rate. Pt is hypertensive.     Pt in mask at time of triage.  Triage staff in appropriate PPE.       Deb Davey, RN  04/18/21 6370

## 2021-04-18 NOTE — DISCHARGE INSTRUCTIONS
Check and record your blood pressure regularly until you follow-up with your primary care doctor.  Return to the emergency department for worsening or persistent symptoms, dizziness, fainting, chest pain, shortness of breath, or other concern.  Follow-up with your cardiologist.

## 2022-02-25 ENCOUNTER — TRANSCRIBE ORDERS (OUTPATIENT)
Dept: ADMINISTRATIVE | Facility: HOSPITAL | Age: 72
End: 2022-02-25

## 2022-02-25 DIAGNOSIS — Z13.6 ENCOUNTER FOR SCREENING FOR VASCULAR DISEASE: Primary | ICD-10-CM

## 2022-06-17 NOTE — TELEPHONE ENCOUNTER
He's fine to keep appt with Isma on 6/18  
I called and he is fine with that........Fabiola    
Pt called and said he went to the ER at [facility] yesterday because of chest pain. He ended up having a cardioversion while he was there. They told him to follow up with benigno or Dr. Barrios in 2 days. I am working on getting the records because some of it is in [facility]. He is scheduled to see Dr. Ashby 6/18/19.........Fabiola    
independent

## 2022-07-19 ENCOUNTER — APPOINTMENT (OUTPATIENT)
Dept: CARDIOLOGY | Facility: HOSPITAL | Age: 72
End: 2022-07-19

## 2022-12-02 ENCOUNTER — APPOINTMENT (OUTPATIENT)
Dept: CARDIOLOGY | Facility: HOSPITAL | Age: 72
End: 2022-12-02

## 2022-12-06 ENCOUNTER — TRANSCRIBE ORDERS (OUTPATIENT)
Dept: ADMINISTRATIVE | Facility: HOSPITAL | Age: 72
End: 2022-12-06

## 2022-12-06 DIAGNOSIS — Z13.6 ENCOUNTER FOR SCREENING FOR VASCULAR DISEASE: Primary | ICD-10-CM

## 2023-04-07 ENCOUNTER — HOSPITAL ENCOUNTER (OUTPATIENT)
Dept: CARDIOLOGY | Facility: HOSPITAL | Age: 73
Discharge: HOME | End: 2023-04-07
Admitting: INTERNAL MEDICINE

## 2023-04-07 VITALS
DIASTOLIC BLOOD PRESSURE: 70 MMHG | SYSTOLIC BLOOD PRESSURE: 130 MMHG | BODY MASS INDEX: 32.49 KG/M2 | WEIGHT: 207 LBS | HEART RATE: 92 BPM | HEIGHT: 67 IN

## 2023-04-07 DIAGNOSIS — Z13.6 ENCOUNTER FOR SCREENING FOR VASCULAR DISEASE: ICD-10-CM

## 2023-04-07 LAB
BH CV ECHO MEAS - DIST AO DIAM: 1.58 CM
BH CV VAS BP LEFT ARM: NORMAL MMHG
BH CV VAS BP RIGHT ARM: NORMAL MMHG
BH CV XLRA MEAS - MID AO DIAM: 2.26 CM
BH CV XLRA MEAS - PAD LEFT ABI DP: 1.06
BH CV XLRA MEAS - PAD LEFT ABI PT: 1.05
BH CV XLRA MEAS - PAD LEFT ARM: 130 MMHG
BH CV XLRA MEAS - PAD LEFT LEG DP: 138 MMHG
BH CV XLRA MEAS - PAD LEFT LEG PT: 136 MMHG
BH CV XLRA MEAS - PAD RIGHT ABI DP: 1.03
BH CV XLRA MEAS - PAD RIGHT ABI PT: 1.05
BH CV XLRA MEAS - PAD RIGHT ARM: 128 MMHG
BH CV XLRA MEAS - PAD RIGHT LEG DP: 134 MMHG
BH CV XLRA MEAS - PAD RIGHT LEG PT: 137 MMHG
BH CV XLRA MEAS - PROX AO DIAM: 2.44 CM
BH CV XLRA MEAS LEFT ICA/CCA RATIO: 2.06
BH CV XLRA MEAS LEFT MID CCA PSV: NORMAL CM/SEC
BH CV XLRA MEAS LEFT MID ICA PSV: NORMAL CM/SEC
BH CV XLRA MEAS LEFT PROX ECA PSV: NORMAL CM/SEC
BH CV XLRA MEAS RIGHT ICA/CCA RATIO: 1.29
BH CV XLRA MEAS RIGHT MID CCA PSV: NORMAL CM/SEC
BH CV XLRA MEAS RIGHT MID ICA PSV: NORMAL CM/SEC
BH CV XLRA MEAS RIGHT PROX ECA PSV: NORMAL CM/SEC
MAXIMAL PREDICTED HEART RATE: 150 BPM
STRESS TARGET HR: 128 BPM

## 2023-04-07 PROCEDURE — 93799 UNLISTED CV SVC/PROCEDURE: CPT

## (undated) DEVICE — PREF.GUIDING SHEATH W/MULT.CRV: Brand: PREFACE

## (undated) DEVICE — 6F .070 JR 4 100CM: Brand: CORDIS

## (undated) DEVICE — Device: Brand: ISMUS

## (undated) DEVICE — SHEATH INTRO FASTCATH 0.038GW 8F 12CM

## (undated) DEVICE — GLIDESHEATH BASIC HYDROPHILIC COATED INTRODUCER SHEATH: Brand: GLIDESHEATH

## (undated) DEVICE — DEV INDEFLATOR

## (undated) DEVICE — ELECTRD ECG CARBON/SNP RL FM A/ 5PK

## (undated) DEVICE — TREK CORONARY DILATATION CATHETER 3.0 MM X 15 MM / RAPID-EXCHANGE: Brand: TREK

## (undated) DEVICE — KT MANIFLD CARDIAC

## (undated) DEVICE — BND PRESS RADL COMFRT 14IN STRL

## (undated) DEVICE — HI-TORQUE BALANCE MIDDLEWEIGHT UNIVERSAL II GUIDE WIRE STRAIGHT TIP PAK  190 CM: Brand: HI-TORQUE BALANCE MIDDLEWEIGHT UNIVERSAL II

## (undated) DEVICE — CATH DIAG IMPULSE FL3.5 5F 100CM

## (undated) DEVICE — Device: Brand: REFERENCE PATCH CARTO 3

## (undated) DEVICE — Device

## (undated) DEVICE — CATH VENT MIV RADL PIG ST TIP 5F 110CM

## (undated) DEVICE — Device: Brand: WEBSTER CS

## (undated) DEVICE — PK CATH CARD 40

## (undated) DEVICE — GW EMR FIX EXCHG J STD .035 3MM 260CM

## (undated) DEVICE — CATH DIAG IMPULSE FR4 5F 100CM

## (undated) DEVICE — Device: Brand: EZ STEER NAV DS

## (undated) DEVICE — LOU EP: Brand: MEDLINE INDUSTRIES, INC.

## (undated) DEVICE — 3M™ BAIR HUGGER® UNDERBODY BLANKET, ADULT, 10 PER CASE 54500: Brand: BAIR HUGGER™